# Patient Record
Sex: MALE | Race: WHITE | NOT HISPANIC OR LATINO | Employment: OTHER | URBAN - NONMETROPOLITAN AREA
[De-identification: names, ages, dates, MRNs, and addresses within clinical notes are randomized per-mention and may not be internally consistent; named-entity substitution may affect disease eponyms.]

---

## 2017-06-23 ENCOUNTER — TRANSFERRED RECORDS (OUTPATIENT)
Dept: HEALTH INFORMATION MANAGEMENT | Facility: HOSPITAL | Age: 72
End: 2017-06-23

## 2017-07-10 ENCOUNTER — OFFICE VISIT (OUTPATIENT)
Dept: FAMILY MEDICINE | Facility: OTHER | Age: 72
End: 2017-07-10
Attending: FAMILY MEDICINE
Payer: MEDICARE

## 2017-07-10 VITALS
DIASTOLIC BLOOD PRESSURE: 78 MMHG | WEIGHT: 160 LBS | OXYGEN SATURATION: 96 % | TEMPERATURE: 97.2 F | HEIGHT: 71 IN | BODY MASS INDEX: 22.4 KG/M2 | HEART RATE: 97 BPM | SYSTOLIC BLOOD PRESSURE: 128 MMHG

## 2017-07-10 DIAGNOSIS — F10.90 ALCOHOL USE DISORDER: ICD-10-CM

## 2017-07-10 DIAGNOSIS — E03.9 ACQUIRED HYPOTHYROIDISM: Primary | ICD-10-CM

## 2017-07-10 DIAGNOSIS — F03.91 DEMENTIA WITH BEHAVIORAL DISTURBANCE, UNSPECIFIED DEMENTIA TYPE: ICD-10-CM

## 2017-07-10 DIAGNOSIS — Z71.89 ACP (ADVANCE CARE PLANNING): Chronic | ICD-10-CM

## 2017-07-10 PROCEDURE — 99213 OFFICE O/P EST LOW 20 MIN: CPT | Performed by: FAMILY MEDICINE

## 2017-07-10 PROCEDURE — 99202 OFFICE O/P NEW SF 15 MIN: CPT | Performed by: FAMILY MEDICINE

## 2017-07-10 ASSESSMENT — ANXIETY QUESTIONNAIRES
5. BEING SO RESTLESS THAT IT IS HARD TO SIT STILL: NOT AT ALL
1. FEELING NERVOUS, ANXIOUS, OR ON EDGE: NOT AT ALL
4. TROUBLE RELAXING: NOT AT ALL
2. NOT BEING ABLE TO STOP OR CONTROL WORRYING: NOT AT ALL
6. BECOMING EASILY ANNOYED OR IRRITABLE: NOT AT ALL
GAD7 TOTAL SCORE: 0
3. WORRYING TOO MUCH ABOUT DIFFERENT THINGS: NOT AT ALL
7. FEELING AFRAID AS IF SOMETHING AWFUL MIGHT HAPPEN: NOT AT ALL

## 2017-07-10 ASSESSMENT — PAIN SCALES - GENERAL: PAINLEVEL: NO PAIN (0)

## 2017-07-10 NOTE — NURSING NOTE
"Chief Complaint   Patient presents with     Establish Care     would like to discuss establishing care        Initial /78 (BP Location: Left arm, Patient Position: Chair, Cuff Size: Adult Regular)  Pulse 97  Temp 97.2  F (36.2  C) (Tympanic)  Ht 5' 11\" (1.803 m)  Wt 160 lb (72.6 kg)  SpO2 96%  BMI 22.32 kg/m2 Estimated body mass index is 22.32 kg/(m^2) as calculated from the following:    Height as of this encounter: 5' 11\" (1.803 m).    Weight as of this encounter: 160 lb (72.6 kg).  Medication Reconciliation: complete     TERRENCE MARTÍNEZ      "

## 2017-07-10 NOTE — PROGRESS NOTES
SUBJECTIVE:                                                    Christiano Leon is a 72 year old male who presents to clinic today for the following health issues: he recently was released on a provisional discharge from [facility].  He was admitted to the [facility] for the competency restoration program in March of 2016.  This happened after being adjuicated incompetent to stand trial on charges of assault and the second-degree obstructing the legal process, possessing a pistol/assault weapon and fleeting on foot. He is on Seroquel and this will be managed through [facility] and he has an appointment tomorrow.       Hypothyroidism Follow-up    Since last visit, patient describes the following symptoms: Weight stable, no hair loss, no skin changes, no constipation, no loose stools    Amount of exercise or physical activity: 6-7 days/week for an average of greater than 60 minutes    Problems taking medications regularly: No    Medication side effects: none    Diet: regular (no restrictions)        Problem list and histories reviewed & adjusted, as indicated.  Additional history: as documented    Current Outpatient Prescriptions   Medication Sig Dispense Refill     Levothyroxine Sodium (SYNTHROID PO) Take 50 mcg by mouth daily       Acetaminophen (TYLENOL PO) Take 1,000 mg by mouth every 6 hours as needed for mild pain or fever       Docusate Sodium (COLACE PO) Take 200 mg by mouth 2 times daily       Cholecalciferol (VITAMIN D-3 PO) Take 1,000 Units by mouth daily       ARTIFICIAL TEAR OP Apply 1 drop to eye 4 times daily       QUEtiapine Fumarate (SEROQUEL PO) Take 900 mg by mouth At Bedtime       Allergies   Allergen Reactions     Penicillins        Reviewed and updated as needed this visit by clinical staff  Tobacco  Allergies  Meds  Med Hx  Surg Hx  Fam Hx  Soc Hx      Reviewed and updated as needed this visit by Provider         ROS:  Constitutional, HEENT, cardiovascular,  "pulmonary, gi and gu systems are negative, except as otherwise noted.    OBJECTIVE:     /78 (BP Location: Left arm, Patient Position: Chair, Cuff Size: Adult Regular)  Pulse 97  Temp 97.2  F (36.2  C) (Tympanic)  Ht 5' 11\" (1.803 m)  Wt 160 lb (72.6 kg)  SpO2 96%  BMI 22.32 kg/m2  Body mass index is 22.32 kg/(m^2).  GENERAL: alert and no distress, talkative.  Here with his .  HEENT: pupils reactive.  Poor dentition  NECK: no adenopathy, no asymmetry, masses, or scars and thyroid normal to palpation  RESP: lungs clear to auscultation - no rales, rhonchi or wheezes  CV: regular rate and rhythm, normal S1 S2, no S3 or S4, no murmur, click or rub, no peripheral edema and peripheral pulses strong  ABDOMEN: soft, nontender, no hepatosplenomegaly, no masses and bowel sounds normal  MS: no gross musculoskeletal defects noted, no edema    Diagnostic Test Results:  none     ASSESSMENT/PLAN:               ICD-10-CM    1. Acquired hypothyroidism E03.9 His  will try to track down his most recent TSH.   2. ACP (advance care planning) Z71.89    3. Dementia with behavioral disturbance, unspecified dementia type F03.91 Stable at this time. He will be seen at [facility] regarding his dementia and that appointment is tomorrow.  They will regulate his Seroquel.   4. Alcohol use disorder (H) F10.99 Seems stable           R Dustin Akbar MD  [facility] [place]    "

## 2017-07-10 NOTE — MR AVS SNAPSHOT
"              After Visit Summary   7/10/2017    Christiano Leon    MRN: [id number]           Patient Information     Date Of Birth          1945        Visit Information        Provider Department      7/10/2017 2:30 PM [facility]        Today's Diagnoses     ACP (advance care planning)          Care Instructions    Keep apt with [place]          Follow-ups after your visit        Who to contact     If you have questions or need follow up information about today's clinic visit or your schedule please contact [facility] directly at [phone].  Normal or non-critical lab and imaging results will be communicated to you by [employer], letter or phone within 4 business days after the clinic has received the results. If you do not hear from us within 7 days, please contact the clinic through [email or web address] or phone. If you have a critical or abnormal lab result, we will notify you by phone as soon as possible.  Submit refill requests through SeptRx or call your pharmacy and they will forward the refill request to us. Please allow 3 business days for your refill to be completed.          Additional Information About Your Visit        MyChart Information     SeptRx lets you send messages to your doctor, view your test results, renew your prescriptions, schedule appointments and more. To sign up, go to [email or web address] . Click on \"Log in\" on the left side of the screen, which will take you to the Welcome page. Then click on \"Sign up Now\" on the right side of the page.     You will be asked to enter the access code listed below, as well as some personal information. Please follow the directions to create your username and password.     Your access code is: [email or web address]  Expires: 10/8/2017  2:31 PM     Your access code will  in 90 days. If you need help or a new code, please call your [facility] or [phone].        Care EveryWhere ID     This is your Care " "EveryWhere ID. This could be used by other organizations to access your [place] medical records  [id number]        Your Vitals Were     Pulse Temperature Height Pulse Oximetry BMI (Body Mass Index)       97 97.2  F (36.2  C) (Tympanic) 5' 11\" (1.803 m) 96% 22.32 kg/m2        Blood Pressure from Last 3 Encounters:   07/10/17 128/78    Weight from Last 3 Encounters:   07/10/17 160 lb (72.6 kg)              Today, you had the following     No orders found for display       Primary Care Provider    None Specified       No primary provider on file.        Equal Access to Services     [facility]: Hadii kellee russo Soolivia, wacherelleda narinderadaha, josyybemilee kaalmadarrion robles, stoney roberson . So [facility] [phone].    ATENCIÓN: Si habla español, tiene a pierce disposición servicios gratuitos de asistencia lingüística. Llame al [phone].    We comply with applicable federal civil rights laws and [place] laws. We do not discriminate on the basis of race, color, national origin, age, disability sex, sexual orientation or gender identity.            Thank you!     Thank you for choosing [facility]  for your care. Our goal is always to provide you with excellent care. Hearing back from our patients is one way we can continue to improve our services. Please take a few minutes to complete the written survey that you may receive in the mail after your visit with us. Thank you!             Your Updated Medication List - Protect others around you: Learn how to safely use, store and throw away your medicines at www.disposemymeds.org.          This list is accurate as of: 7/10/17  2:31 PM.  Always use your most recent med list.                   Brand Name Dispense Instructions for use Diagnosis    ARTIFICIAL TEAR OP      Apply 1 drop to eye 4 times daily        COLACE PO      Take 200 mg by mouth 2 times daily        SEROQUEL PO      Take 900 mg by mouth At Bedtime        SYNTHROID PO      " Take 50 mcg by mouth daily        TYLENOL PO      Take 1,000 mg by mouth every 6 hours as needed for mild pain or fever        VITAMIN D-3 PO      Take 1,000 Units by mouth daily

## 2017-07-11 ASSESSMENT — PATIENT HEALTH QUESTIONNAIRE - PHQ9: SUM OF ALL RESPONSES TO PHQ QUESTIONS 1-9: 0

## 2017-07-11 ASSESSMENT — ANXIETY QUESTIONNAIRES: GAD7 TOTAL SCORE: 0

## 2017-08-31 DIAGNOSIS — E03.9 HYPOTHYROIDISM, UNSPECIFIED TYPE: Primary | ICD-10-CM

## 2017-08-31 RX ORDER — LEVOTHYROXINE SODIUM 50 UG/1
50 TABLET ORAL DAILY
Qty: 30 TABLET | Refills: 0 | Status: SHIPPED | OUTPATIENT
Start: 2017-08-31 | End: 2017-09-18

## 2017-08-31 NOTE — TELEPHONE ENCOUNTER
Synthroid historical in [facility]. No noted TSH. Last office visit on 7.10.17. Medication pended.Thank you

## 2017-09-12 DIAGNOSIS — K59.00 CONSTIPATION, UNSPECIFIED CONSTIPATION TYPE: Primary | ICD-10-CM

## 2017-09-12 RX ORDER — DOCUSATE SODIUM 100 MG/1
TABLET ORAL
Qty: 120 CAPSULE | Refills: 0 | Status: SHIPPED | OUTPATIENT
Start: 2017-09-12 | End: 2017-10-03

## 2017-09-12 NOTE — TELEPHONE ENCOUNTER
Docqlace  Last Written Prescription Date: never filled in epic   Last Fill Quantity: 120,  # refills: 0   Last Office Visit with [facility], [facility] or [facility] prescribing provider: 7/10/2017

## 2017-09-18 DIAGNOSIS — E03.9 HYPOTHYROIDISM, UNSPECIFIED TYPE: ICD-10-CM

## 2017-09-18 RX ORDER — LEVOTHYROXINE SODIUM 50 UG/1
TABLET ORAL
Qty: 30 TABLET | Refills: 0 | Status: SHIPPED | OUTPATIENT
Start: 2017-09-18 | End: 2017-09-25

## 2017-09-18 NOTE — TELEPHONE ENCOUNTER
Synthroid     Last Written Prescription Date: 8/31/17  Last Quantity: 30, # refills: 0  Last Office Visit with [facility] or [facility] prescribing provider: 7/10/17        No results found for: TSH

## 2017-09-18 NOTE — TELEPHONE ENCOUNTER
No TSH on file.    Last and only office visit from 7/10/17 has note: His  will try to track down his most recent TSH.  I have checked media and am unable to locate TSH.  Medication pended.  Please advise.  Thank you.

## 2017-09-25 DIAGNOSIS — E03.9 HYPOTHYROIDISM, UNSPECIFIED TYPE: ICD-10-CM

## 2017-09-26 RX ORDER — LEVOTHYROXINE SODIUM 50 UG/1
TABLET ORAL
Qty: 30 TABLET | Refills: 1 | Status: SHIPPED | OUTPATIENT
Start: 2017-09-26 | End: 2017-12-18

## 2017-09-26 NOTE — TELEPHONE ENCOUNTER
Levothyroxine  Last office visit: 7/10/17 that  would track down last TSH.  No TSH on file.    Last refill: 9/18/17 #30, 0 R.  Medication pended.  Please advise.  Thank you.

## 2017-10-03 DIAGNOSIS — K59.00 CONSTIPATION, UNSPECIFIED CONSTIPATION TYPE: ICD-10-CM

## 2017-10-03 NOTE — TELEPHONE ENCOUNTER
Docqlace      Last Written Prescription Date: 9/12/17  Last Fill Quantity: 120,  # refills: 0   Last Office Visit with [facility], [facility] or [facility] prescribing provider: 7/10/17

## 2017-10-04 RX ORDER — DOCUSATE SODIUM 100 MG/1
TABLET ORAL
Qty: 100 CAPSULE | Refills: 1 | Status: SHIPPED | OUTPATIENT
Start: 2017-10-04 | End: 2017-12-11

## 2017-10-31 DIAGNOSIS — E55.9 VITAMIN D DEFICIENCY: Primary | ICD-10-CM

## 2017-11-01 ENCOUNTER — TRANSFERRED RECORDS (OUTPATIENT)
Dept: HEALTH INFORMATION MANAGEMENT | Facility: HOSPITAL | Age: 72
End: 2017-11-01

## 2017-11-01 NOTE — TELEPHONE ENCOUNTER
Vit d          Last Written Prescription Date: never filled in epic   Last Fill Quantity: unknown,  # refills: unknown   Last Office Visit with [facility], [facility] or [facility] prescribing provider: 7/10/2017

## 2017-11-03 RX ORDER — CHOLECALCIFEROL (VITAMIN D3) 25 MCG
TABLET ORAL
Qty: 100 TABLET | Refills: 1 | Status: SHIPPED | OUTPATIENT
Start: 2017-11-03 | End: 2017-11-16

## 2017-11-03 NOTE — TELEPHONE ENCOUNTER
Please see note below.  Vitamin D never filled in Epic.  Medication pended.  Please review and sign if appropriate.  Thank you.

## 2017-11-16 ENCOUNTER — OFFICE VISIT (OUTPATIENT)
Dept: FAMILY MEDICINE | Facility: OTHER | Age: 72
End: 2017-11-16
Attending: FAMILY MEDICINE
Payer: MEDICARE

## 2017-11-16 VITALS
SYSTOLIC BLOOD PRESSURE: 122 MMHG | OXYGEN SATURATION: 97 % | HEART RATE: 80 BPM | RESPIRATION RATE: 18 BRPM | DIASTOLIC BLOOD PRESSURE: 80 MMHG | HEIGHT: 71 IN | TEMPERATURE: 96.4 F | WEIGHT: 165 LBS | BODY MASS INDEX: 23.1 KG/M2

## 2017-11-16 DIAGNOSIS — Z87.19 HISTORY OF INGUINAL HERNIA REPAIR: ICD-10-CM

## 2017-11-16 DIAGNOSIS — R10.31 RIGHT GROIN PAIN: Primary | ICD-10-CM

## 2017-11-16 DIAGNOSIS — Z98.890 HISTORY OF INGUINAL HERNIA REPAIR: ICD-10-CM

## 2017-11-16 PROCEDURE — 99212 OFFICE O/P EST SF 10 MIN: CPT

## 2017-11-16 PROCEDURE — 99213 OFFICE O/P EST LOW 20 MIN: CPT | Performed by: FAMILY MEDICINE

## 2017-11-16 ASSESSMENT — ANXIETY QUESTIONNAIRES
2. NOT BEING ABLE TO STOP OR CONTROL WORRYING: NOT AT ALL
GAD7 TOTAL SCORE: 0
3. WORRYING TOO MUCH ABOUT DIFFERENT THINGS: NOT AT ALL
6. BECOMING EASILY ANNOYED OR IRRITABLE: NOT AT ALL
7. FEELING AFRAID AS IF SOMETHING AWFUL MIGHT HAPPEN: NOT AT ALL
4. TROUBLE RELAXING: NOT AT ALL
5. BEING SO RESTLESS THAT IT IS HARD TO SIT STILL: NOT AT ALL
1. FEELING NERVOUS, ANXIOUS, OR ON EDGE: NOT AT ALL

## 2017-11-16 ASSESSMENT — PATIENT HEALTH QUESTIONNAIRE - PHQ9: SUM OF ALL RESPONSES TO PHQ QUESTIONS 1-9: 0

## 2017-11-16 ASSESSMENT — PAIN SCALES - GENERAL: PAINLEVEL: MODERATE PAIN (5)

## 2017-11-16 NOTE — MR AVS SNAPSHOT
After Visit Summary   11/16/2017    Christiano Leon    MRN: [id number]           Patient Information     Date Of Birth          1945        Visit Information        Provider Department      11/16/2017 2:30 PM ROBERT Akbar MD [facility]        Today's Diagnoses     Right groin pain    -  1    History of inguinal hernia repair          Care Instructions    Surgery will call you          Follow-ups after your visit        Additional Services     GENERAL SURG ADULT REFERRAL       Your provider has referred you to: [facility]: [facility] [phone]   [email or web address]    Please be aware that coverage of these services is subject to the terms and limitations of your health insurance plan.  Call member services at your health plan with any benefit or coverage questions.      Please bring the following with you to your appointment:    (1) Any X-Rays, CTs or MRIs which have been performed.  Contact the facility where they were done to arrange for  prior to your scheduled appointment.   (2) List of current medications   (3) This referral request   (4) Any documents/labs given to you for this referral                  Who to contact     If you have questions or need follow up information about today's clinic visit or your schedule please contact [facility] directly at [phone].  Normal or non-critical lab and imaging results will be communicated to you by MyChart, letter or phone within 4 business days after the clinic has received the results. If you do not hear from us within 7 days, please contact the clinic through MyChart or phone. If you have a critical or abnormal lab result, we will notify you by phone as soon as possible.  Submit refill requests through SNADEC or call your pharmacy and they will forward the refill request to us. Please allow 3 business days for your refill to be completed.  "         Additional Information About Your Visit        MyChart Information     [employer] lets you send messages to your doctor, view your test results, renew your prescriptions, schedule appointments and more. To sign up, go to [email or web address] . Click on \"Log in\" on the left side of the screen, which will take you to the Welcome page. Then click on \"Sign up Now\" on the right side of the page.     You will be asked to enter the access code listed below, as well as some personal information. Please follow the directions to create your username and password.     Your access code is: [email or web address]  Expires: 2018  2:35 PM     Your access code will  in 90 days. If you need help or a new code, please call your [place] clinic or [phone].        Care EveryWhere ID     This is your [facility] EveryWhere ID. This could be used by other organizations to access your [place] medical records  [id number]        Your Vitals Were     Pulse Temperature Respirations Height Pulse Oximetry BMI (Body Mass Index)    80 96.4  F (35.8  C) (Tympanic) 18 5' 11\" (1.803 m) 97% 23.01 kg/m2       Blood Pressure from Last 3 Encounters:   17 122/80   07/10/17 128/78    Weight from Last 3 Encounters:   17 165 lb (74.8 kg)   07/10/17 160 lb (72.6 kg)              We Performed the Following     GENERAL SURG ADULT REFERRAL        Primary Care Provider Office Phone # Fax #    R Dustin Akbar [phone]       [facility] [place] [address] [place] [id number]        Equal Access to Services     [facility] : Hadgreta Pedroza, alden ann, stoney don. So [facility] [phone].    ATENCIÓN: Si habla español, tiene a pierce disposición servicios gratuitos de asistencia lingüística. Anabel al [phone].    We comply with applicable federal civil rights laws and [place] laws. We do not discriminate on the basis of race, color, " national origin, age, disability, sex, sexual orientation, or gender identity.            Thank you!     Thank you for choosing [facility]  for your care. Our goal is always to provide you with excellent care. Hearing back from our patients is one way we can continue to improve our services. Please take a few minutes to complete the written survey that you may receive in the mail after your visit with us. Thank you!             Your Updated Medication List - Protect others around you: Learn how to safely use, store and throw away your medicines at www.disposemymeds.org.          This list is accurate as of: 11/16/17  2:35 PM.  Always use your most recent med list.                   Brand Name Dispense Instructions for use Diagnosis    ARTIFICIAL TEAR OP      Apply 1 drop to eye 4 times daily        DOCQLACE 100 MG capsule   Generic drug:  docusate sodium     100 capsule    TAKE 2 CAPSULES (200MG) BY MOUTH TWICE DAILY    Constipation, unspecified constipation type       levothyroxine 50 MCG tablet    SYNTHROID/LEVOTHROID    30 tablet    TAKE 1 TABLET (50 MCG) BY MOUTH DAILY    Hypothyroidism, unspecified type       SEROQUEL PO      Take 900 mg by mouth At Bedtime        TYLENOL PO      Take 1,000 mg by mouth every 6 hours as needed for mild pain or fever        VITAMIN D-3 PO      Take 1,000 Units by mouth daily

## 2017-11-16 NOTE — PROGRESS NOTES
"  SUBJECTIVE:   Christiano Leon is a 72 year old male who presents to clinic today for the following health issues:      Groin pain. Patients had intermittent pain with activities such as walking or carrying items.  If he rests it goes away.  No fever or chills or dysuria or stool problems.  About 10 years ago had a mesh for right inguinal hernia repair.        PAST MEDICAL HISTORY:  Past Medical History:   Diagnosis Date     BPH (benign prostatic hyperplasia)      Poor dentition        PAST SURGICAL HISTORY:  Past Surgical History:   Procedure Laterality Date     Bilateral cataract       HERNIA REPAIR Right 2007?     Right knee meniscectomy Right        MEDICATIONS:  Prior to Admission medications    Medication Sig Start Date End Date Taking? Authorizing Provider   DOCQLACE 100 MG capsule TAKE 2 CAPSULES (200MG) BY MOUTH TWICE DAILY 10/4/17  Yes ROBERT Akbar MD   levothyroxine (SYNTHROID/LEVOTHROID) 50 MCG tablet TAKE 1 TABLET (50 MCG) BY MOUTH DAILY 9/26/17  Yes ROBERT Akbar MD   Acetaminophen (TYLENOL PO) Take 1,000 mg by mouth every 6 hours as needed for mild pain or fever   Yes Reported, Patient   Cholecalciferol (VITAMIN D-3 PO) Take 1,000 Units by mouth daily   Yes Reported, Patient   ARTIFICIAL TEAR OP Apply 1 drop to eye 4 times daily   Yes Reported, Patient   QUEtiapine Fumarate (SEROQUEL PO) Take 900 mg by mouth At Bedtime   Yes Reported, Patient       ALLERGIES:     Allergies   Allergen Reactions     Penicillins        ROS:  Constitutional, HEENT, cardiovascular, pulmonary, gi and gu systems are negative, except as otherwise noted.        EXAM:/80 (BP Location: Left arm, Patient Position: Sitting, Cuff Size: Adult Regular)  Pulse 80  Temp 96.4  F (35.8  C) (Tympanic)  Resp 18  Ht 5' 11\" (1.803 m)  Wt 165 lb (74.8 kg)  SpO2 97%  BMI 23.01 kg/m2 Body mass index is 23.01 kg/(m^2).   GENERAL APPEARANCE: healthy, alert and no distress  EYES: Eyes grossly normal to inspection, PERRL and " conjunctivae and sclerae normal  HENT: ear canals and TM's normal and nose and mouth without ulcers or lesions   (male): Testicles normal.  Penis normal.  No palpable hernia but he has tenderness in the right groin where he had his surgery.  There is no ecchymosis or swelling.  No evidence of left inguinal hernia  NEURO: Normal strength and tone, mentation intact and speech normal  Lab/ X-ray  No results found for this or any previous visit (from the past 24 hour(s)).    ASSESSMENT/PLAN:    ICD-10-CM    1. Right groin pain R10.31 GENERAL SURG ADULT REFERRAL   2. History of inguinal hernia repair Z98.890 GENERAL SURG ADULT REFERRAL. He will avoid heavy activities and we will arrange for surgical consult For his Pain in the previous site of  the hernia repair. [facility]    Z87.19          MALVIN Akbar MD  November 16, 2017

## 2017-11-16 NOTE — NURSING NOTE
"Chief Complaint   Patient presents with     Pain     right groin area. Has had past history of hernia there.       Initial /80 (BP Location: Left arm, Patient Position: Sitting, Cuff Size: Adult Regular)  Pulse 80  Temp 96.4  F (35.8  C) (Tympanic)  Resp 18  Ht 5' 11\" (1.803 m)  Wt 165 lb (74.8 kg)  SpO2 97%  BMI 23.01 kg/m2 Estimated body mass index is 23.01 kg/(m^2) as calculated from the following:    Height as of this encounter: 5' 11\" (1.803 m).    Weight as of this encounter: 165 lb (74.8 kg).  Medication Reconciliation: complete   Che Ross LPN      "

## 2017-11-17 ASSESSMENT — ANXIETY QUESTIONNAIRES: GAD7 TOTAL SCORE: 0

## 2017-12-04 ENCOUNTER — OFFICE VISIT (OUTPATIENT)
Dept: SURGERY | Facility: OTHER | Age: 72
End: 2017-12-04
Attending: FAMILY MEDICINE
Payer: MEDICARE

## 2017-12-04 VITALS
HEIGHT: 71 IN | OXYGEN SATURATION: 96 % | WEIGHT: 165 LBS | HEART RATE: 74 BPM | SYSTOLIC BLOOD PRESSURE: 124 MMHG | DIASTOLIC BLOOD PRESSURE: 84 MMHG | TEMPERATURE: 96.8 F | BODY MASS INDEX: 23.1 KG/M2

## 2017-12-04 DIAGNOSIS — Z98.890 HISTORY OF INGUINAL HERNIA REPAIR: ICD-10-CM

## 2017-12-04 DIAGNOSIS — R10.31 RIGHT GROIN PAIN: ICD-10-CM

## 2017-12-04 DIAGNOSIS — Z87.19 HISTORY OF INGUINAL HERNIA REPAIR: ICD-10-CM

## 2017-12-04 PROCEDURE — 99213 OFFICE O/P EST LOW 20 MIN: CPT

## 2017-12-04 PROCEDURE — 99203 OFFICE O/P NEW LOW 30 MIN: CPT | Performed by: SURGERY

## 2017-12-04 ASSESSMENT — PAIN SCALES - GENERAL: PAINLEVEL: SEVERE PAIN (6)

## 2017-12-04 NOTE — PROGRESS NOTES
Surgery Consult Clinic Note      RE: Christiano Leon  : 1945    Chief Complaint:  Right groin pain    History of Present Illness:  Mr. Leon is a very pleasant 72 year old male who I am seeing at the request of ROBERT Akbar MD for evaluation of right groin pain and to make further recommendations.  Has open right inguinal hernia surgery 10 years ago.  Since that time, he's had pain in the right groin.  States he can feel the mesh and it causes him sharp pain at times, worse with bending over and after a day's work.  Denies nausea, vomiting, unexplained bloating. He specifically denies fever, chills, nausea, vomiting, chest pain, shortness of breath or palpitations.      Medical history:  Past Medical History:   Diagnosis Date     BPH (benign prostatic hyperplasia)      Poor dentition        Surgical history:  Past Surgical History:   Procedure Laterality Date     Bilateral cataract       HERNIA REPAIR Right ?     Right knee meniscectomy Right        Family history:  No family history on file.    Medications:  Current Outpatient Prescriptions   Medication Sig Dispense Refill     DOCQLACE 100 MG capsule TAKE 2 CAPSULES (200MG) BY MOUTH TWICE DAILY 100 capsule 1     levothyroxine (SYNTHROID/LEVOTHROID) 50 MCG tablet TAKE 1 TABLET (50 MCG) BY MOUTH DAILY 30 tablet 1     Acetaminophen (TYLENOL PO) Take 1,000 mg by mouth every 6 hours as needed for mild pain or fever       Cholecalciferol (VITAMIN D-3 PO) Take 1,000 Units by mouth daily       ARTIFICIAL TEAR OP Apply 1 drop to eye 4 times daily       QUEtiapine Fumarate (SEROQUEL PO) Take 900 mg by mouth At Bedtime         Allergies:  The patientis allergic to nickel; penicillins; and seasonal allergies.  .  Social history:  Social History   Substance Use Topics     Smoking status: Former Smoker     Smokeless tobacco: Never Used     Alcohol use No     Marital status: single.    Review of Systems:    Constitutional: Negative for fever, chills and weight loss.  "  HENT: Negative for ear pain, nosebleeds, congestion, sore throat, tinnitus and ear discharge.    Eyes: Negative for blurred vision, double vision, photophobia and pain.   Respiratory: Negative for cough, hemoptysis, shortness of breath, wheezing and stridor.    Cardiovascular: Negative for chest pain, palpitations and orthopnea.   Gastrointestinal: Per HPI  Genitourinary: Negative for urgency, frequency and hematuria.   Musculoskeletal: Negative for myalgias, back pain and joint pain.   Neurological: Negative for tingling, speech change and headaches.   Endo/Heme/Allergies: Does not bruise/bleed easily.   Psychiatric/Behavioral: Negative for depression, suicidal ideas and hallucinations. The patient is not nervous/anxious.    Physical Examination:  /84  Pulse 74  Temp 96.8  F (36  C) (Tympanic)  Ht 1.803 m (5' 11\")  Wt 74.8 kg (165 lb)  SpO2 96%  BMI 23.01 kg/m2  General: AAOx4, NAD, WN/WD, ambulating without assistance  HEENT:NCAT, EOMI, PERRL Sclerae anicteric; Trachea mideline, no JVD  Chest:   Clear to auscultation bilaterally.  Cardiac: S1S2 , regular rate and rhythm without additional sounds  Abdomen: Soft, ND/NT no rebound, no guarding.  Right groin firm mass just medial to the ASIS that is non reducible, doesn't increased in size with cough.  Inguinal canal free of masses or pain.  Left groin free of masses  Extremities: Cursory exam unremarkable.  Skin: Warm, dry, < 2 sec cap refill  Neuro: CN 2-12 grossly intact, no focal deficit, GCS 15  Psych: happy, calm, asks appropriate questions      Assessment/Plan:  #1 Right groin pain    I don't feel a hernia, but I do feel what is likely a granuloma from the previous surgery.  We'll order the ultrasound to rule out recurrence.  Then we'll talk about exploration or not.  I'd be more tempted to trial a trigger point injection first.          Dr Portillo  [facility] and [address], [address]  [place], [place]   "  [id number]    Referring Provider:  ROBERT Akbar MD  [address] [address]     Primary Care Provider:  ROBERT Akbar

## 2017-12-04 NOTE — MR AVS SNAPSHOT
After Visit Summary   12/4/2017    Christiano Leon    MRN: [id number]           Patient Information     Date Of Birth          1945        Visit Information        Provider Department      12/4/2017 1:45 PM Jose Maria Portillo, DO [facility]        Today's Diagnoses     Right groin pain        History of inguinal hernia repair          Care Instructions    Thank you for allowing Dr. Portillo and our surgical team to participate in your care. Please call with any scheduling questions or concerns to [facility] at [phone] or for nursing questions Carol [phone].\  You have an order placed with [facility]. They will call you to set up an appointment for your test specified by Dr. Portillo.  We will call with results and follow up appointment when we get results from ultrasound.            Follow-ups after your visit        Future tests that were ordered for you today     Open Future Orders        Priority Expected Expires Ordered    US Abdomen Limited Routine  12/4/2018 12/4/2017            Who to contact     If you have questions or need follow up information about today's clinic visit or your schedule please contact [facility] directly at [phone].  Normal or non-critical lab and imaging results will be communicated to you by Novafor[employer]art, letter or phone within 4 business days after the clinic has received the results. If you do not hear from us within 7 days, please contact the clinic through [employer] or phone. If you have a critical or abnormal lab result, we will notify you by phone as soon as possible.  Submit refill requests through [employer] or call your pharmacy and they will forward the refill request to us. Please allow 3 business days for your refill to be completed.          Additional Information About Your Visit        Nova[employer]Net Information     [employer] lets you send messages to your doctor, view your test results, renew your prescriptions, schedule  "appointments and more. To sign up, go to [email or web address] . Click on \"Log in\" on the left side of the screen, which will take you to the Welcome page. Then click on \"Sign up Now\" on the right side of the page.     You will be asked to enter the access code listed below, as well as some personal information. Please follow the directions to create your username and password.     Your access code is: [email or web address]  Expires: 2018  2:35 PM     Your access code will  in 90 days. If you need help or a new code, please call your [facility] clinic or [phone].        Care EveryWhere ID     This is your Care EveryWhere ID. This could be used by other organizations to access your [facility] medical records  [id number]        Your Vitals Were     Pulse Temperature Height Pulse Oximetry BMI (Body Mass Index)       74 96.8  F (36  C) (Tympanic) 5' 11\" (1.803 m) 96% 23.01 kg/m2        Blood Pressure from Last 3 Encounters:   17 124/84   17 122/80   07/10/17 128/78    Weight from Last 3 Encounters:   17 165 lb (74.8 kg)   17 165 lb (74.8 kg)   07/10/17 160 lb (72.6 kg)               Primary Care Provider Office Phone # Fax #    R Dustin Akbar [phone]       [place] [address]        Equal Access to Services     ANA CARDONA AH: Hadii kellee posadas hadasho Soomaali, waaxda luqadaha, qaybta kaalmada adeegyadarrion, stoney roberson . So [facility] [phone].    ATENCIÓN: Si habla español, tiene a pierce disposición servicios gratuitos de asistencia lingüística. Llame al [phone].    We comply with applicable federal civil rights laws and [place] laws. We do not discriminate on the basis of race, color, national origin, age, disability, sex, sexual orientation, or gender identity.            Thank you!     Thank you for choosing [facility]  for your care. Our goal is always to provide you with excellent " care. Hearing back from our patients is one way we can continue to improve our services. Please take a few minutes to complete the written survey that you may receive in the mail after your visit with us. Thank you!             Your Updated Medication List - Protect others around you: Learn how to safely use, store and throw away your medicines at www.disposemymeds.org.          This list is accurate as of: 12/4/17  2:02 PM.  Always use your most recent med list.                   Brand Name Dispense Instructions for use Diagnosis    ARTIFICIAL TEAR OP      Apply 1 drop to eye 4 times daily        DOCQLACE 100 MG capsule   Generic drug:  docusate sodium     100 capsule    TAKE 2 CAPSULES (200MG) BY MOUTH TWICE DAILY    Constipation, unspecified constipation type       levothyroxine 50 MCG tablet    SYNTHROID/LEVOTHROID    30 tablet    TAKE 1 TABLET (50 MCG) BY MOUTH DAILY    Hypothyroidism, unspecified type       SEROQUEL PO      Take 900 mg by mouth At Bedtime        TYLENOL PO      Take 1,000 mg by mouth every 6 hours as needed for mild pain or fever        VITAMIN D-3 PO      Take 1,000 Units by mouth daily

## 2017-12-04 NOTE — PATIENT INSTRUCTIONS
Thank you for allowing Dr. Portillo and our surgical team to participate in your care. Please call with any scheduling questions or concerns to [facility] at [phone] or for nursing questions Carol [phone].\  You have an order placed with [facility]. They will call you to set up an appointment for your test specified by Dr. Portillo.  We will call with results and follow up appointment when we get results from ultrasound.

## 2017-12-04 NOTE — NURSING NOTE
"Chief Complaint   Patient presents with     Consult     right groin pain , hx of inguinal hernia repair, hx of bilateral repair in 2007 at [facility]       Initial /84  Pulse 74  Temp 96.8  F (36  C) (Tympanic)  Ht 5' 11\" (1.803 m)  Wt 165 lb (74.8 kg)  SpO2 96%  BMI 23.01 kg/m2 Estimated body mass index is 23.01 kg/(m^2) as calculated from the following:    Height as of this encounter: 5' 11\" (1.803 m).    Weight as of this encounter: 165 lb (74.8 kg).  Medication Reconciliation: complete     Carol Rivera      "

## 2017-12-11 ENCOUNTER — HOSPITAL ENCOUNTER (OUTPATIENT)
Dept: ULTRASOUND IMAGING | Facility: HOSPITAL | Age: 72
Discharge: HOME | End: 2017-12-11
Attending: SURGERY | Admitting: SURGERY
Payer: MEDICARE

## 2017-12-11 DIAGNOSIS — Z87.19 HISTORY OF INGUINAL HERNIA REPAIR: ICD-10-CM

## 2017-12-11 DIAGNOSIS — Z98.890 HISTORY OF INGUINAL HERNIA REPAIR: ICD-10-CM

## 2017-12-11 DIAGNOSIS — R10.31 RIGHT GROIN PAIN: ICD-10-CM

## 2017-12-11 DIAGNOSIS — K59.00 CONSTIPATION, UNSPECIFIED CONSTIPATION TYPE: ICD-10-CM

## 2017-12-11 PROCEDURE — 76705 ECHO EXAM OF ABDOMEN: CPT | Mod: TC

## 2017-12-12 NOTE — TELEPHONE ENCOUNTER
DOK 100MG CAP      Last Written Prescription Date: 10/04/2017  Last Fill Quantity: 100,  # refills: 1   Last Office Visit with [facility], [facility] or [facility] prescribing provider: 11/16/2017                                         Next 5 appointments (look out 90 days)     Dec 19, 2017  9:30 AM CST   (Arrive by 9:15 AM)   Return Visit with Jose Maria Portillo DO   [facility] ([facility] )    [address] [place] [address]   [phone]

## 2017-12-14 ENCOUNTER — DOCUMENTATION ONLY (OUTPATIENT)
Dept: VASCULAR SURGERY | Facility: CLINIC | Age: 72
End: 2017-12-14

## 2017-12-14 DIAGNOSIS — Z13.6 ENCOUNTER FOR ABDOMINAL AORTIC ANEURYSM (AAA) SCREENING: Primary | ICD-10-CM

## 2017-12-14 RX ORDER — DOCUSATE SODIUM 100 MG/1
CAPSULE, LIQUID FILLED ORAL
Qty: 100 CAPSULE | Refills: 11 | Status: SHIPPED | OUTPATIENT
Start: 2017-12-14 | End: 2018-09-11

## 2017-12-18 DIAGNOSIS — E03.9 HYPOTHYROIDISM, UNSPECIFIED TYPE: ICD-10-CM

## 2017-12-20 RX ORDER — LEVOTHYROXINE SODIUM 50 UG/1
TABLET ORAL
Qty: 30 TABLET | Refills: 0 | Status: SHIPPED | OUTPATIENT
Start: 2017-12-20 | End: 2018-01-22

## 2018-01-02 ENCOUNTER — OFFICE VISIT (OUTPATIENT)
Dept: SURGERY | Facility: OTHER | Age: 73
End: 2018-01-02
Attending: SURGERY
Payer: MEDICARE

## 2018-01-02 VITALS
DIASTOLIC BLOOD PRESSURE: 68 MMHG | WEIGHT: 165 LBS | TEMPERATURE: 96.2 F | HEIGHT: 71 IN | BODY MASS INDEX: 23.1 KG/M2 | RESPIRATION RATE: 18 BRPM | HEART RATE: 97 BPM | OXYGEN SATURATION: 95 % | SYSTOLIC BLOOD PRESSURE: 118 MMHG

## 2018-01-02 DIAGNOSIS — R10.31 RIGHT GROIN PAIN: Primary | ICD-10-CM

## 2018-01-02 PROCEDURE — G0463 HOSPITAL OUTPT CLINIC VISIT: HCPCS

## 2018-01-02 PROCEDURE — 99213 OFFICE O/P EST LOW 20 MIN: CPT | Performed by: SURGERY

## 2018-01-02 ASSESSMENT — ANXIETY QUESTIONNAIRES
1. FEELING NERVOUS, ANXIOUS, OR ON EDGE: NOT AT ALL
3. WORRYING TOO MUCH ABOUT DIFFERENT THINGS: NOT AT ALL
2. NOT BEING ABLE TO STOP OR CONTROL WORRYING: NOT AT ALL
5. BEING SO RESTLESS THAT IT IS HARD TO SIT STILL: NOT AT ALL
6. BECOMING EASILY ANNOYED OR IRRITABLE: NOT AT ALL
7. FEELING AFRAID AS IF SOMETHING AWFUL MIGHT HAPPEN: NOT AT ALL
IF YOU CHECKED OFF ANY PROBLEMS ON THIS QUESTIONNAIRE, HOW DIFFICULT HAVE THESE PROBLEMS MADE IT FOR YOU TO DO YOUR WORK, TAKE CARE OF THINGS AT HOME, OR GET ALONG WITH OTHER PEOPLE: NOT DIFFICULT AT ALL
GAD7 TOTAL SCORE: 0

## 2018-01-02 ASSESSMENT — PAIN SCALES - GENERAL: PAINLEVEL: NO PAIN (0)

## 2018-01-02 ASSESSMENT — PATIENT HEALTH QUESTIONNAIRE - PHQ9
SUM OF ALL RESPONSES TO PHQ QUESTIONS 1-9: 0
5. POOR APPETITE OR OVEREATING: NOT AT ALL

## 2018-01-02 NOTE — PATIENT INSTRUCTIONS
Thank you for allowing Dr. Jaquez and our surgical team to participate in your care. Please call with any scheduling questions or concerns to [facility] at [phone] or for nursing questions  Faye [phone].

## 2018-01-02 NOTE — MR AVS SNAPSHOT
"              After Visit Summary   1/2/2018    Christiano Leon    MRN: [id number]           Patient Information     Date Of Birth          1945        Visit Information        Provider Department      1/2/2018 9:30 AM Jose Maria Portillo, DO [facility]        Today's Diagnoses     Right groin pain    -  1      Care Instructions    Thank you for allowing Dr. Kitchen's and our surgical team to participate in your care. Please call with any scheduling questions or concerns to [facility] at [phone] or for nursing questions  Faye [phone].          Follow-ups after your visit        Who to contact     If you have questions or need follow up information about today's clinic visit or your schedule please contact [facility] directly at [phone].  Normal or non-critical lab and imaging results will be communicated to you by [place] [employer], letter or phone within 4 business days after the clinic has received the results. If you do not hear from us within 7 days, please contact the clinic through MyChart or phone. If you have a critical or abnormal lab result, we will notify you by phone as soon as possible.  Submit refill requests through [employer] or call your pharmacy and they will forward the refill request to us. Please allow 3 business days for your refill to be completed.          Additional Information About Your Visit        [employer] Information     [employer] lets you send messages to your doctor, view your test results, renew your prescriptions, schedule appointments and more. To sign up, go to [email or web address] . Click on \"Log in\" on the left side of the screen, which will take you to the Welcome page. Then click on \"Sign up Now\" on the right side of the page.     You will be asked to enter the access code listed below, as well as some personal information. Please follow the directions to create your username and password.     Your access code is: [email or web address]  Expires: 2/14/2018  " "2:35 PM     Your access code will  in 90 days. If you need help or a new code, please call your [facility] or [phone].        Care EveryWhere ID     This is your Care EveryWhere ID. This could be used by other organizations to access your [facility] medical records  [id number]        Your Vitals Were     Pulse Temperature Respirations Height Pulse Oximetry BMI (Body Mass Index)    97 96.2  F (35.7  C) (Tympanic) 18 5' 11\" (1.803 m) 95% 23.01 kg/m2       Blood Pressure from Last 3 Encounters:   18 118/68   17 124/84   17 122/80    Weight from Last 3 Encounters:   18 165 lb (74.8 kg)   17 165 lb (74.8 kg)   17 165 lb (74.8 kg)              Today, you had the following     No orders found for display       Primary Care Provider Office Phone # Fax #    R Dustin Akbar [phone]       [place] [facility] [address] [place], [place] [id number]        Equal Access to Services     : Hadii aad ku hadasho Soomaali, waaxda luqadaha, qaybta kaalmada adeegyada, waxgeo mejiain hayaan adejennifer roberson . So [facility] [phone].    ATENCIÓN: Si habla español, tiene a pierce disposición servicios gratuitos de asistencia lingüística. Llame al [phone].    We comply with applicable federal civil rights laws and [place] laws. We do not discriminate on the basis of race, color, national origin, age, disability, sex, sexual orientation, or gender identity.            Thank you!     Thank you for choosing [facility]  for your care. Our goal is always to provide you with excellent care. Hearing back from our patients is one way we can continue to improve our services. Please take a few minutes to complete the written survey that you may receive in the mail after your visit with us. Thank you!             Your Updated Medication List - Protect others around you: Learn how to safely use, store and throw away your medicines at " www.disposemymeds.org.          This list is accurate as of: 1/2/18  9:44 AM.  Always use your most recent med list.                   Brand Name Dispense Instructions for use Diagnosis    ARTIFICIAL TEAR OP      Apply 1 drop to eye 4 times daily         MG capsule   Generic drug:  docusate sodium     100 capsule    TAKE 2 CAPSULES (200MG) BY MOUTH TWICE DAILY    Constipation, unspecified constipation type       levothyroxine 50 MCG tablet    SYNTHROID/LEVOTHROID    30 tablet    TAKE 1 TABLET DAILY BY MOUTH    Hypothyroidism, unspecified type       SEROQUEL PO      Take 900 mg by mouth At Bedtime        TYLENOL PO      Take 1,000 mg by mouth every 6 hours as needed for mild pain or fever        VITAMIN D-3 PO      Take 1,000 Units by mouth daily

## 2018-01-02 NOTE — NURSING NOTE
"Chief Complaint   Patient presents with     Consult     Follow up Rt groin pain  US 12/11/17       Initial /68 (BP Location: Left arm, Patient Position: Sitting, Cuff Size: Adult Regular)  Pulse 97  Temp 96.2  F (35.7  C) (Tympanic)  Resp 18  Ht 5' 11\" (1.803 m)  Wt 165 lb (74.8 kg)  SpO2 95%  BMI 23.01 kg/m2 Estimated body mass index is 23.01 kg/(m^2) as calculated from the following:    Height as of this encounter: 5' 11\" (1.803 m).    Weight as of this encounter: 165 lb (74.8 kg).  Medication Reconciliation: complete   Lexy Arevalo MA  "

## 2018-01-02 NOTE — PROGRESS NOTES
"S: here to review ultrasound results.  Came in with chronic right groin pain.  History of open right inguinal hernia surgery.  No recurrence felt.  Ultrasound negative for recurrence.  Still occasionally gets the pain if he overworks that resolves in a day.    O:  /68 (BP Location: Left arm, Patient Position: Sitting, Cuff Size: Adult Regular)  Pulse 97  Temp 96.2  F (35.7  C) (Tympanic)  Resp 18  Ht 1.803 m (5' 11\")  Wt 74.8 kg (165 lb)  SpO2 95%  BMI 23.01 kg/m2   General: AAOx4, NAD, WN/WD, ambulating without assistance  HEENT:NCAT, EOMI, PERRL Sclerae anicteric; Trachea mideline, no JVD  Chest:   Clear to auscultation bilaterally.  Cardiac: S1S2 , regular rate and rhythm without additional sounds  Abdomen: Soft, ND/NT no rebound, no guarding.  Right groin firm mass just medial to the ASIS that is non reducible, doesn't increased in size with cough.  Inguinal canal free of masses or pain.  Left groin free of masses  Extremities: Cursory exam unremarkable.  Skin: Warm, dry, < 2 sec cap refill  Neuro: CN 2-12 grossly intact, no focal deficit, GCS 15  Psych: happy, calm, asks appropriate questions    A/P  #1 Right groin pain  #2 Suture granuloma    Patient comfortable with observing for now.  Just wanted to make sure it wasn't recurrence.  Return PRN.  "

## 2018-01-03 ASSESSMENT — ANXIETY QUESTIONNAIRES: GAD7 TOTAL SCORE: 0

## 2018-01-22 DIAGNOSIS — E03.9 HYPOTHYROIDISM, UNSPECIFIED TYPE: ICD-10-CM

## 2018-01-22 RX ORDER — LEVOTHYROXINE SODIUM 50 UG/1
TABLET ORAL
Qty: 30 TABLET | Refills: 0 | Status: SHIPPED | OUTPATIENT
Start: 2018-01-22 | End: 2018-02-05

## 2018-01-22 NOTE — TELEPHONE ENCOUNTER
Synthroid  Last Written Prescription Date: 12/20/17  Last Fill Quantity: 30 # of Refills: 0  Last Office Visit: 11/16/17

## 2018-02-05 DIAGNOSIS — E03.9 HYPOTHYROIDISM, UNSPECIFIED TYPE: ICD-10-CM

## 2018-02-05 NOTE — TELEPHONE ENCOUNTER
levothyroxine (SYNTHROID/LEVOTHROID) 50 MCG tablet     Last Written Prescription Date:  01/22/2018  Last Fill Quantity: 30,   # refills: 0  Last Office Visit: 11/16/2017  Future Office visit:       Routing refill request to provider for review/approval because:

## 2018-02-06 RX ORDER — LEVOTHYROXINE SODIUM 50 UG/1
TABLET ORAL
Qty: 30 TABLET | Refills: 3 | Status: SHIPPED | OUTPATIENT
Start: 2018-02-06 | End: 2018-06-18

## 2018-03-02 ENCOUNTER — RADIANT APPOINTMENT (OUTPATIENT)
Dept: GENERAL RADIOLOGY | Facility: OTHER | Age: 73
End: 2018-03-02
Attending: FAMILY MEDICINE
Payer: MEDICARE

## 2018-03-02 ENCOUNTER — OFFICE VISIT (OUTPATIENT)
Dept: FAMILY MEDICINE | Facility: OTHER | Age: 73
End: 2018-03-02
Attending: FAMILY MEDICINE
Payer: MEDICARE

## 2018-03-02 VITALS
DIASTOLIC BLOOD PRESSURE: 72 MMHG | OXYGEN SATURATION: 96 % | WEIGHT: 168.5 LBS | BODY MASS INDEX: 23.5 KG/M2 | HEART RATE: 81 BPM | SYSTOLIC BLOOD PRESSURE: 104 MMHG | TEMPERATURE: 97 F

## 2018-03-02 DIAGNOSIS — R61 DIAPHORESIS: ICD-10-CM

## 2018-03-02 DIAGNOSIS — F03.91 DEMENTIA WITH BEHAVIORAL DISTURBANCE, UNSPECIFIED DEMENTIA TYPE: Primary | ICD-10-CM

## 2018-03-02 DIAGNOSIS — E03.9 ACQUIRED HYPOTHYROIDISM: ICD-10-CM

## 2018-03-02 LAB
ANION GAP SERPL CALCULATED.3IONS-SCNC: 7 MMOL/L (ref 3–14)
BASOPHILS # BLD AUTO: 0 10E9/L (ref 0–0.2)
BASOPHILS NFR BLD AUTO: 0.4 %
BUN SERPL-MCNC: 16 MG/DL (ref 7–30)
CALCIUM SERPL-MCNC: 8.3 MG/DL (ref 8.5–10.1)
CHLORIDE SERPL-SCNC: 111 MMOL/L (ref 94–109)
CO2 SERPL-SCNC: 25 MMOL/L (ref 20–32)
CREAT SERPL-MCNC: 1.03 MG/DL (ref 0.66–1.25)
CRP SERPL-MCNC: 4.4 MG/L (ref 0–8)
DIFFERENTIAL METHOD BLD: NORMAL
EOSINOPHIL # BLD AUTO: 0.1 10E9/L (ref 0–0.7)
EOSINOPHIL NFR BLD AUTO: 2.1 %
ERYTHROCYTE [DISTWIDTH] IN BLOOD BY AUTOMATED COUNT: 13.3 % (ref 10–15)
ERYTHROCYTE [SEDIMENTATION RATE] IN BLOOD BY WESTERGREN METHOD: 11 MM/H (ref 0–20)
GFR SERPL CREATININE-BSD FRML MDRD: 70 ML/MIN/1.7M2
GLUCOSE SERPL-MCNC: 119 MG/DL (ref 70–99)
HCT VFR BLD AUTO: 40.3 % (ref 40–53)
HGB BLD-MCNC: 13.5 G/DL (ref 13.3–17.7)
IMM GRANULOCYTES # BLD: 0 10E9/L (ref 0–0.4)
IMM GRANULOCYTES NFR BLD: 0.4 %
LYMPHOCYTES # BLD AUTO: 1.2 10E9/L (ref 0.8–5.3)
LYMPHOCYTES NFR BLD AUTO: 25.8 %
MCH RBC QN AUTO: 30.3 PG (ref 26.5–33)
MCHC RBC AUTO-ENTMCNC: 33.5 G/DL (ref 31.5–36.5)
MCV RBC AUTO: 91 FL (ref 78–100)
MONOCYTES # BLD AUTO: 0.5 10E9/L (ref 0–1.3)
MONOCYTES NFR BLD AUTO: 9.8 %
NEUTROPHILS # BLD AUTO: 2.9 10E9/L (ref 1.6–8.3)
NEUTROPHILS NFR BLD AUTO: 61.5 %
NRBC # BLD AUTO: 0 10*3/UL
NRBC BLD AUTO-RTO: 0 /100
PLATELET # BLD AUTO: 167 10E9/L (ref 150–450)
POTASSIUM SERPL-SCNC: 3.8 MMOL/L (ref 3.4–5.3)
RBC # BLD AUTO: 4.45 10E12/L (ref 4.4–5.9)
SODIUM SERPL-SCNC: 143 MMOL/L (ref 133–144)
TSH SERPL DL<=0.005 MIU/L-ACNC: 1.26 MU/L (ref 0.4–4)
WBC # BLD AUTO: 4.7 10E9/L (ref 4–11)

## 2018-03-02 PROCEDURE — 86140 C-REACTIVE PROTEIN: CPT | Mod: ZL | Performed by: FAMILY MEDICINE

## 2018-03-02 PROCEDURE — 85025 COMPLETE CBC W/AUTO DIFF WBC: CPT | Mod: ZL | Performed by: FAMILY MEDICINE

## 2018-03-02 PROCEDURE — 85652 RBC SED RATE AUTOMATED: CPT | Mod: ZL | Performed by: FAMILY MEDICINE

## 2018-03-02 PROCEDURE — 71046 X-RAY EXAM CHEST 2 VIEWS: CPT | Mod: TC

## 2018-03-02 PROCEDURE — 99214 OFFICE O/P EST MOD 30 MIN: CPT | Performed by: FAMILY MEDICINE

## 2018-03-02 PROCEDURE — 80048 BASIC METABOLIC PNL TOTAL CA: CPT | Mod: ZL | Performed by: FAMILY MEDICINE

## 2018-03-02 PROCEDURE — 36415 COLL VENOUS BLD VENIPUNCTURE: CPT | Mod: ZL | Performed by: FAMILY MEDICINE

## 2018-03-02 PROCEDURE — G0463 HOSPITAL OUTPT CLINIC VISIT: HCPCS | Mod: 25

## 2018-03-02 PROCEDURE — 84443 ASSAY THYROID STIM HORMONE: CPT | Mod: ZL | Performed by: FAMILY MEDICINE

## 2018-03-02 ASSESSMENT — ANXIETY QUESTIONNAIRES
5. BEING SO RESTLESS THAT IT IS HARD TO SIT STILL: NOT AT ALL
7. FEELING AFRAID AS IF SOMETHING AWFUL MIGHT HAPPEN: NOT AT ALL
2. NOT BEING ABLE TO STOP OR CONTROL WORRYING: NOT AT ALL
4. TROUBLE RELAXING: NOT AT ALL
3. WORRYING TOO MUCH ABOUT DIFFERENT THINGS: NOT AT ALL
6. BECOMING EASILY ANNOYED OR IRRITABLE: NOT AT ALL
1. FEELING NERVOUS, ANXIOUS, OR ON EDGE: NOT AT ALL
GAD7 TOTAL SCORE: 0

## 2018-03-02 ASSESSMENT — PAIN SCALES - GENERAL: PAINLEVEL: NO PAIN (0)

## 2018-03-02 NOTE — PROGRESS NOTES
SUBJECTIVE:                                                    Christiano Leon is a 73 year old male who presents to clinic today for the following health issues:        Fatigue and sweaty      Duration: about a month or longer    Description (location/character/radiation): body sweats    Intensity:  mild    Accompanying signs and symptoms: body sweats have been getting more frequent. Weak, tired and sweaty    History (similar episodes/previous evaluation): None    Precipitating or alleviating factors: None    Therapies tried and outcome: stays in room and turns up the heat     Patient has had some daytime sweating can last 20-25 minutes at a time it is intermittent it is not every day.  He also has had a cough and chest congestion.  No myalgias or fevers nausea vomiting diarrhea or shortness of breath or chest pain.  [facility]    Christiano Leon, 73 year old, male presents with   Chief Complaint   Patient presents with     Fatigue     Sweats       PAST MEDICAL HISTORY:  Past Medical History:   Diagnosis Date     BPH (benign prostatic hyperplasia)      Poor dentition        PAST SURGICAL HISTORY:  Past Surgical History:   Procedure Laterality Date     Bilateral cataract       HERNIA REPAIR Right 2007?     Right knee meniscectomy Right        MEDICATIONS:  Prior to Admission medications    Medication Sig Start Date End Date Taking? Authorizing Provider   levothyroxine (SYNTHROID/LEVOTHROID) 50 MCG tablet TAKE 1 TABLET DAILY BY MOUTH - GENERIC FOR SYNTHROID 2/6/18  Yes ROBERT Akbar MD    MG capsule TAKE 2 CAPSULES (200MG) BY MOUTH TWICE DAILY 12/14/17  Yes ROBERT Akbar MD   Acetaminophen (TYLENOL PO) Take 1,000 mg by mouth every 6 hours as needed for mild pain or fever   Yes Reported, Patient   Cholecalciferol (VITAMIN D-3 PO) Take 1,000 Units by mouth daily   Yes Reported, Patient   ARTIFICIAL TEAR OP Apply 1 drop to eye 4 times daily   Yes Reported, Patient   QUEtiapine Fumarate (SEROQUEL  PO) Take 900 mg by mouth At Bedtime   Yes Reported, Patient       ALLERGIES:     Allergies   Allergen Reactions     Nickel      Possible. Cheap metal.     Penicillins      Seasonal Allergies      Perfumes       ROS:  Constitutional, neuro, ENT, endocrine, pulmonary, cardiac, gastrointestinal, genitourinary, musculoskeletal, integument and psychiatric systems are negative, except as otherwise noted.      EXAM:  /72  Pulse 81  Temp 97  F (36.1  C) (Tympanic)  Wt 168 lb 8 oz (76.4 kg)  SpO2 96%  BMI 23.5 kg/m2 Body mass index is 23.5 kg/(m^2).   GENERAL APPEARANCE: healthy, alert and no distress  EYES: Eyes grossly normal to inspection, PERRL and conjunctivae and sclerae normal  HENT: ear canals and TM's normal and nose and mouth without ulcers or lesions  NECK: no adenopathy, no asymmetry, masses, or scars and thyroid normal to palpation  RESP: lungs clear to auscultation - no rales, rhonchi or wheezes  CV: regular rates and rhythm, normal S1 S2, no S3 or S4 and no murmur, click or rub  ABDOMEN: soft, nontender, without hepatosplenomegaly or masses and bowel sounds normal  NEURO: Normal strength and tone, mentation intact and speech normal  PSYCH: mentation appears to be at baseline.  No delusional thoughts.  He is pleasant no sense of agitation  Lab/ X-ray  Results for orders placed or performed in visit on 03/02/18 (from the past 24 hour(s))   CBC with platelets differential   Result Value Ref Range    WBC 4.7 4.0 - 11.0 10e9/L    RBC Count 4.45 4.4 - 5.9 10e12/L    Hemoglobin 13.5 13.3 - 17.7 g/dL    Hematocrit 40.3 40.0 - 53.0 %    MCV 91 78 - 100 fl    MCH 30.3 26.5 - 33.0 pg    MCHC 33.5 31.5 - 36.5 g/dL    RDW 13.3 10.0 - 15.0 %    Platelet Count 167 150 - 450 10e9/L    Diff Method Automated Method     % Neutrophils 61.5 %    % Lymphocytes 25.8 %    % Monocytes 9.8 %    % Eosinophils 2.1 %    % Basophils 0.4 %    % Immature Granulocytes 0.4 %    Nucleated RBCs 0 0 /100    Absolute Neutrophil 2.9 1.6  - 8.3 10e9/L    Absolute Lymphocytes 1.2 0.8 - 5.3 10e9/L    Absolute Monocytes 0.5 0.0 - 1.3 10e9/L    Absolute Eosinophils 0.1 0.0 - 0.7 10e9/L    Absolute Basophils 0.0 0.0 - 0.2 10e9/L    Abs Immature Granulocytes 0.0 0 - 0.4 10e9/L    Absolute Nucleated RBC 0.0      Date Exam Time Accession # Performing Department Results    3/2/18 10:48 AM [id number] [facility] X[facility]    [employer] Interactive Report and InfoRx   View the interactive report   PACS Images   Show images for XR Chest 2 Views   Study Result   XR CHEST 2 VW     HISTORY: 73 years Male ; Diaphoresis     COMPARISON: 3/26/2011     TECHNIQUE: 2 views of the chest were obtained.     FINDINGS: Two views of the chest were obtained. Heart size and  pulmonary vascularity are within normal limits, lungs are clear both  views. No consolidating air space opacities are present.               IMPRESSION: Clear chest.     ROSETTE GERMAIN MD     MIGUEL-7 SCORE 11/16/2017 1/2/2018 3/2/2018   Total Score 0 0 0     PHQ-9 SCORE 11/16/2017 1/2/2018 3/2/2018   Total Score 0 0 0         ASSESSMENT/PLAN:    ICD-10-CM    1. Dementia with behavioral disturbance, unspecified dementia type F03.91    2. Acquired hypothyroidism E03.9 TSH with free T4 reflex   3. Diaphoresis R61 Basic metabolic panel     CBC with platelets differential     Erythrocyte sedimentation rate auto     CRP inflammation     XR Chest 2 Views     Dementia stable.  Will check a TSH because of his history of hypothyroidism and also this intermittent diaphoresis.  CBC and chest x-ray are normal.  Also check sed rate CRP and BMP.  We will see him in a month sooner if there are problems.  He does not appear ill or toxic today.  If he is doing well in a month we can give him his Prevnar and tetanus and discuss colonoscopy.  Here with caregiver from Raj Akbar MD  March 2, 2018

## 2018-03-02 NOTE — NURSING NOTE
"Chief Complaint   Patient presents with     Fatigue     Sweats       Initial /72  Pulse 81  Temp 97  F (36.1  C) (Tympanic)  Wt 168 lb 8 oz (76.4 kg)  SpO2 96%  BMI 23.5 kg/m2 Estimated body mass index is 23.5 kg/(m^2) as calculated from the following:    Height as of 1/2/18: 5' 11\" (1.803 m).    Weight as of this encounter: 168 lb 8 oz (76.4 kg).  Medication Reconciliation: complete  "

## 2018-03-02 NOTE — MR AVS SNAPSHOT
After Visit Summary   3/2/2018    Christiano Leon    MRN: [id number]           Patient Information     Date Of Birth          1945        Visit Information        Provider Department      3/2/2018 10:30 AM ROBERT Akbar MD [facility]        Today's Diagnoses     Dementia with behavioral disturbance, unspecified dementia type    -  1    Acquired hypothyroidism        Diaphoresis          Care Instructions    We will call with the labs.            Follow-ups after your visit        Follow-up notes from your care team     Return in about 4 weeks (around 3/30/2018).      Your next 10 appointments already scheduled     Apr 03, 2018  1:15 PM CDT   (Arrive by 1:00 PM)   Office Visit with ROBERT Akbar MD   [facility] ([facility] )    [address]  [facility] [address]   [phone]           Bring a current list of meds and any records pertaining to this visit.  For Physicals, please bring immunization records and any forms needing to be filled out.  Please arrive 15 minutes early to complete paperwork and register.              Who to contact     If you have questions or need follow up information about today's clinic visit or your schedule please contact [facility] directly at [phone].  Normal or non-critical lab and imaging results will be communicated to you by MyChart, letter or phone within 4 business days after the clinic has received the results. If you do not hear from us within 7 days, please contact the clinic through MyChart or phone. If you have a critical or abnormal lab result, we will notify you by phone as soon as possible.  Submit refill requests through ODIN or call your pharmacy and they will forward the refill request to us. Please allow 3 business days for your refill to be completed.          Additional Information About Your Visit        ExploraMedharYoujia Information     ODIN lets you send messages to your doctor,  "view your test results, renew your prescriptions, schedule appointments and more. To sign up, go to [email or web address] . Click on \"Log in\" on the left side of the screen, which will take you to the Welcome page. Then click on \"Sign up Now\" on the right side of the page.     You will be asked to enter the access code listed below, as well as some personal information. Please follow the directions to create your username and password.     Your access code is: [email or web address]  Expires: 2018 10:55 AM     Your access code will  in 90 days. If you need help or a new code, please call your [place] clinic or [phone].        Care EveryWhere ID     This is your Care EveryWhere ID. This could be used by other organizations to access your [place] medical records  [id number]        Your Vitals Were     Pulse Temperature Pulse Oximetry BMI (Body Mass Index)          81 97  F (36.1  C) (Tympanic) 96% 23.5 kg/m2         Blood Pressure from Last 3 Encounters:   18 104/72   18 118/68   17 124/84    Weight from Last 3 Encounters:   18 168 lb 8 oz (76.4 kg)   18 165 lb (74.8 kg)   17 165 lb (74.8 kg)              We Performed the Following     Basic metabolic panel     CBC with platelets differential     CRP inflammation     Erythrocyte sedimentation rate auto     TSH with free T4 reflex        Primary Care Provider Office Phone # Fax #    R Dustin Akbar [phone]       [address] [place] [address]        Equal Access to Services     [facility] DULCE : Hadgreta Pedroza, alden ann, stoney don. So [facility] [phone].    ATENCIÓN: Si habla español, tiene a pierce disposición servicios gratuitos de asistencia lingüística. Llame al [phone].    We comply with applicable federal civil rights laws and [place] laws. We do not discriminate on the basis of race, color, national origin, " age, disability, sex, sexual orientation, or gender identity.            Thank you!     Thank you for choosing [facility]  [facility] your [facility]. Our goal is always to provide you with excellent care. Hearing back from our patients is one way we can continue to improve our services. Please take a few minutes to complete the written survey that you may receive in the mail after your visit with us. Thank you!             Your Updated Medication List - Protect others around you: Learn how to safely use, store and throw away your medicines at www.disposemymeds.org.          This list is accurate as of 3/2/18 10:55 AM.  Always use your most recent med list.                   Brand Name Dispense Instructions for use Diagnosis    ARTIFICIAL TEAR OP      Apply 1 drop to eye 4 times daily         MG capsule   Generic drug:  docusate sodium     100 capsule    TAKE 2 CAPSULES (200MG) BY MOUTH TWICE DAILY    Constipation, unspecified constipation type       levothyroxine 50 MCG tablet    SYNTHROID/LEVOTHROID    30 tablet    TAKE 1 TABLET DAILY BY MOUTH - GENERIC FOR SYNTHROID    Hypothyroidism, unspecified type       SEROQUEL PO      Take 900 mg by mouth At Bedtime        TYLENOL PO      Take 1,000 mg by mouth every 6 hours as needed for mild pain or fever        VITAMIN D-3 PO      Take 1,000 Units by mouth daily

## 2018-03-03 ASSESSMENT — ANXIETY QUESTIONNAIRES: GAD7 TOTAL SCORE: 0

## 2018-03-03 ASSESSMENT — PATIENT HEALTH QUESTIONNAIRE - PHQ9: SUM OF ALL RESPONSES TO PHQ QUESTIONS 1-9: 0

## 2018-04-03 ENCOUNTER — OFFICE VISIT (OUTPATIENT)
Dept: FAMILY MEDICINE | Facility: OTHER | Age: 73
End: 2018-04-03
Attending: FAMILY MEDICINE
Payer: MEDICARE

## 2018-04-03 VITALS
TEMPERATURE: 97.3 F | WEIGHT: 168.8 LBS | DIASTOLIC BLOOD PRESSURE: 70 MMHG | HEART RATE: 94 BPM | SYSTOLIC BLOOD PRESSURE: 104 MMHG | HEIGHT: 71 IN | OXYGEN SATURATION: 94 % | BODY MASS INDEX: 23.63 KG/M2

## 2018-04-03 DIAGNOSIS — F03.91 DEMENTIA WITH BEHAVIORAL DISTURBANCE, UNSPECIFIED DEMENTIA TYPE: ICD-10-CM

## 2018-04-03 DIAGNOSIS — E03.9 ACQUIRED HYPOTHYROIDISM: Primary | ICD-10-CM

## 2018-04-03 PROCEDURE — G0463 HOSPITAL OUTPT CLINIC VISIT: HCPCS

## 2018-04-03 PROCEDURE — 99213 OFFICE O/P EST LOW 20 MIN: CPT | Performed by: FAMILY MEDICINE

## 2018-04-03 ASSESSMENT — ANXIETY QUESTIONNAIRES
2. NOT BEING ABLE TO STOP OR CONTROL WORRYING: NOT AT ALL
GAD7 TOTAL SCORE: 0
1. FEELING NERVOUS, ANXIOUS, OR ON EDGE: NOT AT ALL
5. BEING SO RESTLESS THAT IT IS HARD TO SIT STILL: NOT AT ALL
3. WORRYING TOO MUCH ABOUT DIFFERENT THINGS: NOT AT ALL
4. TROUBLE RELAXING: NOT AT ALL
6. BECOMING EASILY ANNOYED OR IRRITABLE: NOT AT ALL
7. FEELING AFRAID AS IF SOMETHING AWFUL MIGHT HAPPEN: NOT AT ALL

## 2018-04-03 ASSESSMENT — PAIN SCALES - GENERAL: PAINLEVEL: NO PAIN (0)

## 2018-04-03 NOTE — NURSING NOTE
"Chief Complaint   Patient presents with     Thyroid Problem       Initial /70  Pulse 94  Temp 97.3  F (36.3  C)  Ht 5' 11\" (1.803 m)  Wt 168 lb 12.8 oz (76.6 kg)  SpO2 94%  BMI 23.54 kg/m2 Estimated body mass index is 23.54 kg/(m^2) as calculated from the following:    Height as of this encounter: 5' 11\" (1.803 m).    Weight as of this encounter: 168 lb 12.8 oz (76.6 kg).  Medication Reconciliation: complete  [facility]   Medical Assistant    "

## 2018-04-03 NOTE — MR AVS SNAPSHOT
After Visit Summary   4/3/2018    Christiano Leon    MRN: [id number]           Patient Information     Date Of Birth          1945        Visit Information        Provider Department      4/3/2018 1:15 PM ROBERT Akbar MD [facility]         Follow-ups after your visit        Follow-up notes from your care team     Return in about 6 months (around 10/3/2018).      Your next 10 appointments already scheduled     Apr 03, 2018  1:15 PM CDT   (Arrive by 1:00 PM)   Office Visit with ROBERT Akbar MD   [facility] ([facility] )    [address]  [place] [address] [phone]           Bring a current list of meds and any records pertaining to this visit.  For Physicals, please bring immunization records and any forms needing to be filled out.  Please arrive 15 minutes early to complete paperwork and register.            Oct 04, 2018  1:15 PM CDT   (Arrive by 1:00 PM)   Office Visit with ROBERT Akbar MD   [facility] [place] ([facility] [place] [facility] [place] )    [address]  [place] [address]   [phone]           Bring a current list of meds and any records pertaining to this visit.  For Physicals, please bring immunization records and any forms needing to be filled out.  Please arrive 15 minutes early to complete paperwork and register.              Who to contact     If you have questions or need follow up information about today's clinic visit or your schedule please contact [facility] directly at [phone].  Normal or non-critical lab and imaging results will be communicated to you by MyChart, letter or phone within 4 business days after the clinic has received the results. If you do not hear from us within 7 days, please contact the clinic through MyChart or phone. If you have a critical or abnormal lab result, we will notify you by phone as soon as possible.  Submit refill requests through [employer] or call your  ----- Message from Julio César Lackey MD sent at 11/16/2017  2:00 PM CST -----  Contact: Wife- Anne- [phone]  ok  ----- Message -----  From: Cee Nichols MA  Sent: 11/16/2017   1:56 PM  To: Julio César Lackey MD        ----- Message -----  From: Estelle Hadley  Sent: 11/16/2017   1:45 PM  To: Ziyad Lackey- pts wife called and stated she won't be able to call into the office until 11/22 to get test results     "pharmacy and they will forward the refill request to us. Please allow 3 business days for your refill to be completed.          Additional Information About Your Visit        MyChart Information     VetteryharNewslines lets you send messages to your doctor, view your test results, renew your prescriptions, schedule appointments and more. To sign up, go to [email or web address] . Click on \"Log in\" on the left side of the screen, which will take you to the Welcome page. Then click on \"Sign up Now\" on the right side of the page.     You will be asked to enter the access code listed below, as well as some personal information. Please follow the directions to create your username and password.     Your access code is: [email or web address]  Expires: 2018 11:55 AM     Your access code will  in 90 days. If you need help or a new code, please call your [place] clinic or [phone].        Care EveryWhere ID     This is your Care EveryWhere ID. This could be used by other organizations to access your [place] medical records  [id number]        Your Vitals Were     Pulse Temperature Height Pulse Oximetry BMI (Body Mass Index)       94 97.3  F (36.3  C) 5' 11\" (1.803 m) 94% 23.54 kg/m2        Blood Pressure from Last 3 Encounters:   18 104/70   18 104/72   18 118/68    Weight from Last 3 Encounters:   18 168 lb 12.8 oz (76.6 kg)   18 168 lb 8 oz (76.4 kg)   18 165 lb (74.8 kg)              Today, you had the following     No orders found for display       Primary Care Provider Office Phone # Fax #    R Dustin Akbar [phone] [phone]        Equal Access to Services     KATELYN CARDONA : Keyona Pedroza, alden ann, stoney don. [facility] [phone].    ATENCIÓN: Si habla español, tiene a pierce disposición servicios gratuitos de asistencia lingüística. Llame al " [phone].    We comply with applicable federal civil rights laws and [place] laws. We do not discriminate on the basis of race, color, national origin, age, disability, sex, sexual orientation, or gender identity.            Thank you!     Thank you for choosing [facility]  for your care. Our goal is always to provide you with excellent care. Hearing back from our patients is one way we can continue to improve our services. Please take a few minutes to complete the written survey that you may receive in the mail after your visit with us. Thank you!             Your Updated Medication List - Protect others around you: Learn how to safely use, store and throw away your medicines at www.disposemymeds.org.          This list is accurate as of 4/3/18  1:01 PM.  Always use your most recent med list.                   Brand Name Dispense Instructions for use Diagnosis    ARTIFICIAL TEAR OP      Apply 1 drop to eye 4 times daily         MG capsule   Generic drug:  docusate sodium     100 capsule    TAKE 2 CAPSULES (200MG) BY MOUTH TWICE DAILY    Constipation, unspecified constipation type       levothyroxine 50 MCG tablet    SYNTHROID/LEVOTHROID    30 tablet    TAKE 1 TABLET DAILY BY MOUTH - GENERIC FOR SYNTHROID    Hypothyroidism, unspecified type       SEROQUEL PO      Take 900 mg by mouth At Bedtime        TYLENOL PO      Take 1,000 mg by mouth every 6 hours as needed for mild pain or fever        VITAMIN D-3 PO      Take 1,000 Units by mouth daily

## 2018-04-03 NOTE — PROGRESS NOTES
SUBJECTIVE:   Christiano Leon is a 73 year old male who presents to clinic today for the following health issues:      Hypothyroidism Follow-up      Since last visit, patient describes the following symptoms: Weight stable, no hair loss, no skin changes, no constipation, no loose stools      Amount of exercise or physical activity: Does a lot of walking    Everyday.    Problems taking medications regularly: No    Medication side effects: none    Diet: regular (no restrictions)        He states he was told he had an inguinal hernia on the right in the past and wants it checked.  No flareups of problems.  Also states had problems with diaphoresis earlier but that has resolved    Problem list and histories reviewed & adjusted, as indicated.  Additional history: as documented    Patient Active Problem List   Diagnosis     ACP (advance care planning)     Acquired hypothyroidism     Dementia with behavioral disturbance, unspecified dementia type     Alcohol use disorder (H)     Past Surgical History:   Procedure Laterality Date     Bilateral cataract       HERNIA REPAIR Right 2007?     Right knee meniscectomy Right        Social History   Substance Use Topics     Smoking status: Former Smoker     Smokeless tobacco: Never Used     Alcohol use No     History reviewed. No pertinent family history.      Current Outpatient Prescriptions   Medication Sig Dispense Refill     levothyroxine (SYNTHROID/LEVOTHROID) 50 MCG tablet TAKE 1 TABLET DAILY BY MOUTH - GENERIC FOR SYNTHROID 30 tablet 3      MG capsule TAKE 2 CAPSULES (200MG) BY MOUTH TWICE DAILY 100 capsule 11     Acetaminophen (TYLENOL PO) Take 1,000 mg by mouth every 6 hours as needed for mild pain or fever       Cholecalciferol (VITAMIN D-3 PO) Take 1,000 Units by mouth daily       ARTIFICIAL TEAR OP Apply 1 drop to eye 4 times daily       QUEtiapine Fumarate (SEROQUEL PO) Take 900 mg by mouth At Bedtime       Allergies   Allergen Reactions     Nickel      Possible.  "Cheap metal.     Penicillins      Seasonal Allergies      Perfumes     BP Readings from Last 3 Encounters:   04/03/18 104/70   03/02/18 104/72   01/02/18 118/68    Wt Readings from Last 3 Encounters:   04/03/18 168 lb 12.8 oz (76.6 kg)   03/02/18 168 lb 8 oz (76.4 kg)   01/02/18 165 lb (74.8 kg)                    Reviewed and updated as needed this visit by clinical staff  Tobacco  Allergies  Meds  Med Hx  Surg Hx  Fam Hx  Soc Hx      Reviewed and updated as needed this visit by Provider         ROS:  Constitutional, HEENT, cardiovascular, pulmonary, gi and gu systems are negative, except as otherwise noted.    OBJECTIVE:     /70  Pulse 94  Temp 97.3  F (36.3  C)  Ht 5' 11\" (1.803 m)  Wt 168 lb 12.8 oz (76.6 kg)  SpO2 94%  BMI 23.54 kg/m2  Body mass index is 23.54 kg/(m^2).  GENERAL: healthy, alert and no distress  EYES: Eyes grossly normal to inspection, PERRL and conjunctivae and sclerae normal  NECK: no adenopathy, no asymmetry, masses, or scars and thyroid normal to palpation  RESP: lungs clear to auscultation - no rales, rhonchi or wheezes  CV: regular rate and rhythm, normal S1 S2, no S3 or S4, no murmur, click or rub, no peripheral edema and peripheral pulses strong  ABDOMEN: soft, nontender, no hepatosplenomegaly, no masses and bowel sounds normal   (male): normal male genitalia without lesions or urethral discharge, no hernia    Diagnostic Test Results:  Results for orders placed or performed in visit on 03/02/18   XR Chest 2 Views    Narrative    XR CHEST 2 VW    HISTORY: 73 years Male ; Diaphoresis    COMPARISON: 3/26/2011    TECHNIQUE: 2 views of the chest were obtained.    FINDINGS: Two views of the chest were obtained. Heart size and  pulmonary vascularity are within normal limits, lungs are clear both  views. No consolidating air space opacities are present.          Impression    IMPRESSION: Clear chest.    ROSETTE GERMAIN MD     3/2/18 10:42 AM [id number]    Component Results "   Component Value Flag Ref Range Units Status Collected Lab   TSH 1.26  0.40 - 4.00 mU/L Final 03/02/2018       Exam Date Exam Time Accession # Results    3/2/18 10:42 AM [id number]    Component Results   Component Value Flag Ref Range Units Status Collected Lab   CRP Inflammation 4.4  0.0 - 8.0 mg/L Final 03/02/2018 10:42 AM      Exam Date Exam Time Accession # Results    3/2/18 10:42 AM [id number]    Component Results   Component Value Flag Ref Range Units Status Collected Lab   Sed Rate 11  0 - 20 mm/h Final 03/02/2018 10:42 AM HI     Exam Date Exam Time Accession # Results    3/2/18 10:42 AM [id number]    Component Results   Component Value Flag Ref Range Units Status Collected Lab   WBC 4.7  4.0 - 11.0 10e9/L Final 03/02/2018 10:42 AM HI   RBC Count 4.45  4.4 - 5.9 10e12/L Final 03/02/2018 10:42 AM HI   Hemoglobin 13.5  13.3 - 17.7 g/dL Final 03/02/2018 10:42 AM HI   Hematocrit 40.3  40.0 - 53.0 % Final 03/02/2018 10:42 AM HI   MCV 91  78 - 100 fl Final 03/02/2018 10:42 AM HI   MCH 30.3  26.5 - 33.0 pg Final 03/02/2018 10:42 AM HI   MCHC 33.5  31.5 - 36.5 g/dL Final 03/02/2018 10:42 AM HI   RDW 13.3  10.0 - 15.0 % Final 03/02/2018 10:42 AM HI   Platelet Count 167  150 - 450 10e9/L Final 03/02/2018 10:42 AM HI   Diff Method     Final 03/02/2018 10:42 AM HI   Automated Method   % Neutrophils 61.5   % Final 03/02/2018 10:42 AM HI   % Lymphocytes 25.8   % Final 03/02/2018 10:42 AM HI   % Monocytes 9.8   % Final 03/02/2018 10:42 AM HI   % Eosinophils 2.1   % Final 03/02/2018 10:42 AM HI   % Basophils 0.4   % Final 03/02/2018 10:42 AM HI   % Immature Granulocytes 0.4   % Final 03/02/2018 10:42 AM HI   Nucleated RBCs 0  0 /100 Final 03/02/2018 10:42 AM HI   Absolute Neutrophil 2.9  1.6 - 8.3 10e9/L Final 03/02/2018 10:42 AM HI   Absolute Lymphocytes 1.2  0.8 - 5.3 10e9/L Final 03/02/2018 10:42 AM HI   Absolute Monocytes 0.5  0.0 - 1.3 10e9/L Final 03/02/2018 10:42 AM HI   Absolute Eosinophils 0.1   0.0 - 0.7 10e9/L Final 03/02/2018 10:42 AM HI   Absolute Basophils 0.0  0.0 - 0.2 10e9/L Final 03/02/2018 10:42 AM HI   Abs Immature Granulocytes 0.0  0 - 0.4 10e9/L Final 03/02/2018 10:42 AM HI   Absolute Nucleated RBC 0.0    Final 03/02/2018 10:42 AM HI     3/2/18 10:42 AM [id number]    Component Results   Component Value Flag Ref Range Units Status Collected Lab   Sodium 143  133 - 144 mmol/L Final 03/02/2018 10:42 AM HI   Potassium 3.8  3.4 - 5.3 mmol/L Final 03/02/2018 10:42 AM HI   Chloride 111 (H) 94 - 109 mmol/L Final 03/02/2018 10:42 AM HI   Carbon Dioxide 25  20 - 32 mmol/L Final 03/02/2018 10:42 AM HI   Anion Gap 7  3 - 14 mmol/L Final 03/02/2018 10:42 AM HI   Glucose 119 (H) 70 - 99 mg/dL Final 03/02/2018 10:42 AM HI   Urea Nitrogen 16  7 - 30 mg/dL Final 03/02/2018 10:42 AM HI   Creatinine 1.03  0.66 - 1.25 mg/dL Final 03/02/2018 10:42 AM HI   GFR Estimate 70  >60 mL/min/1.7m2 Final 03/02/2018 10:42 AM HI   Comment:   Non  GFR Calc   GFR Estimate If Black 85  >60 mL/min/1.7m2 Final 03/02/2018 10:42 AM HI   Comment:   African American GFR Calc   Calcium 8.3 (L) 8.5 - 10.1 mg/dL Final 03/02/2018 10:42 AM HI     MIGUEL-7 SCORE 11/16/2017 1/2/2018 3/2/2018   Total Score 0 0 0     PHQ-9 SCORE 11/16/2017 1/2/2018 3/2/2018   Total Score 0 0 0                 ASSESSMENT/PLAN:               ICD-10-CM    1. Acquired hypothyroidism E03.9    2. Dementia with behavioral disturbance, unspecified dementia type F03.91        Recent lab work is stable.  He seems to be doing well.  Reassured him there is no sign of hernia.  We will see him in 6 months sooner if there are problems.  Here with caregiver from [place].  PHQ 9 and MIGUEL are both 0    R Dustin Akbar MD  [facility]

## 2018-04-04 ASSESSMENT — PATIENT HEALTH QUESTIONNAIRE - PHQ9: SUM OF ALL RESPONSES TO PHQ QUESTIONS 1-9: 0

## 2018-04-04 ASSESSMENT — ANXIETY QUESTIONNAIRES: GAD7 TOTAL SCORE: 0

## 2018-04-30 DIAGNOSIS — E55.9 VITAMIN D DEFICIENCY: ICD-10-CM

## 2018-05-01 RX ORDER — CHOLECALCIFEROL (VITAMIN D3) 25 MCG
TABLET ORAL
Qty: 100 TABLET | Refills: 3 | Status: SHIPPED | OUTPATIENT
Start: 2018-05-01 | End: 2019-05-14

## 2018-05-25 NOTE — PROGRESS NOTES
SUBJECTIVE:   Christiano Leon is a 73 year old male who presents to clinic today for the following health issues:      Colon Cancer Screen      Duration: n/a    Description (location/character/radiation): wants to discuss colon screen options     Intensity:  0/10    Accompanying signs and symptoms: none     History (similar episodes/previous evaluation): no family or personal history of colon cancer     Precipitating or alleviating factors: None    Therapies tried and outcome: patient states has had  A colonoscopy done in the past in 2007 at [facility].     Patient also had lipids done in 2012 and the LDL was under 100.  He is not on any lipid-lowering agent       [facility]    Christiano Leon, 73 year old, male presents with   Chief Complaint   Patient presents with     Colonoscopy     Panel Management     lipid, prevnar, TD, colonscopy        PAST MEDICAL HISTORY:  Past Medical History:   Diagnosis Date     BPH (benign prostatic hyperplasia)      Poor dentition        PAST SURGICAL HISTORY:  Past Surgical History:   Procedure Laterality Date     Bilateral cataract       HERNIA REPAIR Right 2007?     Right knee meniscectomy Right        MEDICATIONS:  Prior to Admission medications    Medication Sig Start Date End Date Taking? Authorizing Provider   Acetaminophen (TYLENOL PO) Take 1,000 mg by mouth every 6 hours as needed for mild pain or fever   Yes Reported, Patient   ARTIFICIAL TEAR OP Apply 1 drop to eye 4 times daily   Yes Reported, Patient   Cholecalciferol (VITAMIN D-3 PO) Take 1,000 Units by mouth daily   Yes Reported, Patient    MG capsule TAKE 2 CAPSULES (200MG) BY MOUTH TWICE DAILY 12/14/17  Yes ROBERT Akbar MD   levothyroxine (SYNTHROID/LEVOTHROID) 50 MCG tablet TAKE 1 TABLET DAILY BY MOUTH - GENERIC FOR SYNTHROID 2/6/18  Yes ROBERT Akbar MD   QUEtiapine Fumarate (SEROQUEL PO) Take 900 mg by mouth At Bedtime   Yes Reported, Patient   VITAMIN D3 1000 units tablet TAKE 1 TABLET BY  MOUTH ONCE DAILY 5/1/18  Yes ROBERT Akbar MD       ALLERGIES:     Allergies   Allergen Reactions     Nickel      Possible. Cheap metal.     Penicillins      Seasonal Allergies      Perfumes       ROS:  Constitutional, HEENT, cardiovascular, pulmonary, gi and gu systems are negative, except as otherwise noted.      EXAM:  /64 (BP Location: Right arm, Patient Position: Chair, Cuff Size: Adult Large)  Pulse 75  Temp 97.9  F (36.6  C) (Tympanic)  Wt 168 lb (76.2 kg)  SpO2 95%  BMI 23.43 kg/m2 Body mass index is 23.43 kg/(m^2).   GENERAL APPEARANCE: healthy, alert and no distress  EYES: Eyes grossly normal to inspection, PERRL and conjunctivae and sclerae normal  RESP: lungs clear to auscultation - no rales, rhonchi or wheezes  CV: regular rates and rhythm, normal S1 S2, no S3 or S4 and no murmur, click or rub  ABDOMEN: soft, nontender, without hepatosplenomegaly or masses and bowel sounds normal  Lab/ X-ray  No results found for this or any previous visit (from the past 24 hour(s)).    ASSESSMENT/PLAN:    ICD-10-CM    1. Screening for lipoid disorders Z13.220 Lipid Profile   2. Special screening for malignant neoplasms, colon Z12.11 Immunos occult blood   3. Dementia with behavioral disturbance, unspecified dementia type F03.91      Patient is a resident at [facility] here with his staff member.  Discussed options and he would like to do the IFOB card.  He has eaten today so will come in fasting in the morning for  lipids.  Will call with results.  His memory issues seem to be stable    MALVIN Akbar MD  May 29, 2018

## 2018-05-29 ENCOUNTER — OFFICE VISIT (OUTPATIENT)
Dept: FAMILY MEDICINE | Facility: OTHER | Age: 73
End: 2018-05-29
Attending: FAMILY MEDICINE
Payer: MEDICARE

## 2018-05-29 VITALS
BODY MASS INDEX: 23.43 KG/M2 | HEART RATE: 75 BPM | SYSTOLIC BLOOD PRESSURE: 108 MMHG | TEMPERATURE: 97.9 F | WEIGHT: 168 LBS | OXYGEN SATURATION: 95 % | DIASTOLIC BLOOD PRESSURE: 64 MMHG

## 2018-05-29 DIAGNOSIS — Z12.11 SPECIAL SCREENING FOR MALIGNANT NEOPLASMS, COLON: ICD-10-CM

## 2018-05-29 DIAGNOSIS — F03.91 DEMENTIA WITH BEHAVIORAL DISTURBANCE, UNSPECIFIED DEMENTIA TYPE: ICD-10-CM

## 2018-05-29 DIAGNOSIS — Z13.220 SCREENING FOR LIPOID DISORDERS: Primary | ICD-10-CM

## 2018-05-29 PROCEDURE — 99214 OFFICE O/P EST MOD 30 MIN: CPT | Performed by: FAMILY MEDICINE

## 2018-05-29 PROCEDURE — G0463 HOSPITAL OUTPT CLINIC VISIT: HCPCS

## 2018-05-29 ASSESSMENT — PAIN SCALES - GENERAL: PAINLEVEL: NO PAIN (0)

## 2018-05-29 NOTE — NURSING NOTE
"Chief Complaint   Patient presents with     Colonoscopy     Panel Management     lipid, prevnar, TD, colonscopy        Initial /64 (BP Location: Right arm, Patient Position: Chair, Cuff Size: Adult Large)  Pulse 75  Temp 97.9  F (36.6  C) (Tympanic)  Wt 168 lb (76.2 kg)  SpO2 95%  BMI 23.43 kg/m2 Estimated body mass index is 23.43 kg/(m^2) as calculated from the following:    Height as of 4/3/18: 5' 11\" (1.803 m).    Weight as of this encounter: 168 lb (76.2 kg).  Medication Reconciliation: complete    Sheila Brizuela MA    "

## 2018-05-29 NOTE — MR AVS SNAPSHOT
After Visit Summary   5/29/2018    Christiano Leon    MRN: [id number]           Patient Information     Date Of Birth          1945        Visit Information        Provider Department      5/29/2018 2:30 PM ROBERT Akbar MD [facility]        Today's Diagnoses     Screening for lipoid disorders    -  1    Special screening for malignant neoplasms, colon           Follow-ups after your visit        Your next 10 appointments already scheduled     May 29, 2018  2:30 PM CDT   (Arrive by 2:15 PM)   SHORT with ROBERT Akbar MD   [facility] ([facility] [place] )    [address]  [place] [address]   [phone]            Oct 04, 2018  8:15 AM CDT   (Arrive by 8:00 AM)   Office Visit with ROBERT Akbar MD   [facility] ([facility] [place] )    [address]  [place] [address]   [phone]           Bring a current list of meds and any records pertaining to this visit.  For Physicals, please bring immunization records and any forms needing to be filled out.  Please arrive 15 minutes early to complete paperwork and register.            Oct 04, 2018  1:15 PM CDT   (Arrive by 1:00 PM)   Office Visit with ROBERT Akbar MD   [facility] ([facility] [place] )    [address]  [place] [address]   [phone]           Bring a current list of meds and any records pertaining to this visit.  For Physicals, please bring immunization records and any forms needing to be filled out.  Please arrive 15 minutes early to complete paperwork and register.              Future tests that were ordered for you today     Open Future Orders        Priority Expected Expires Ordered    Lipid Profile Routine 5/30/2018 5/29/2019 5/29/2018    Immunos occult blood Routine  5/29/2019 5/29/2018            Who to contact     If you have questions or need follow up information about today's clinic visit or your schedule please  contact [facility] directly at [phone].  Normal or non-critical lab and imaging results will be communicated to you by MyChart, letter or phone within 4 business days after the clinic has received the results. If you do not hear from us within 7 days, please contact the clinic through MyChart or phone. If you have a critical or abnormal lab result, we will notify you by phone as soon as possible.  Submit refill requests through [employer] or call your pharmacy and they will forward the refill request to us. Please allow 3 business days for your refill to be completed.          Additional Information About Your Visit        Care EveryWhere ID     This is your Care EveryWhere ID. This could be used by other organizations to access your [facility] medical records  [id number]        Your Vitals Were     Pulse Temperature Pulse Oximetry BMI (Body Mass Index)          75 97.9  F (36.6  C) (Tympanic) 95% 23.43 kg/m2         Blood Pressure from Last 3 Encounters:   05/29/18 108/64   04/03/18 104/70   03/02/18 104/72    Weight from Last 3 Encounters:   05/29/18 168 lb (76.2 kg)   04/03/18 168 lb 12.8 oz (76.6 kg)   03/02/18 168 lb 8 oz (76.4 kg)               Primary Care Provider Office Phone # Fax #    R Dustin Akbar [phone] [address] [id number]        Equal Access to Services     [facility] AH: Hadii aad ku hadasho Soomaali, waaxda luqadaha, qaybta kaalmada adeegyada, stoney roberson . So [facility] [phone].    ATENCIÓN: Si habla español, tiene a pierce disposición servicios gratuitos de asistencia lingüística. Llame al [phone].    We comply with applicable federal civil rights laws and [place] laws. We do not discriminate on the basis of race, color, national origin, age, disability, sex, sexual orientation, or gender identity.            Thank you!     Thank you for choosing [facility]  for your care. Our goal is  always to provide you with excellent care. Hearing back from our patients is one way we can continue to improve our services. Please take a few minutes to complete the written survey that you may receive in the mail after your visit with us. Thank you!             Your Updated Medication List - Protect others around you: Learn how to safely use, store and throw away your medicines at www.disposemymeds.org.          This list is accurate as of 5/29/18  2:29 PM.  Always use your most recent med list.                   Brand Name Dispense Instructions for use Diagnosis    ARTIFICIAL TEAR OP      Apply 1 drop to eye 4 times daily         MG capsule   Generic drug:  docusate sodium     100 capsule    TAKE 2 CAPSULES (200MG) BY MOUTH TWICE DAILY    Constipation, unspecified constipation type       levothyroxine 50 MCG tablet    SYNTHROID/LEVOTHROID    30 tablet    TAKE 1 TABLET DAILY BY MOUTH - GENERIC FOR SYNTHROID    Hypothyroidism, unspecified type       SEROQUEL PO      Take 900 mg by mouth At Bedtime        TYLENOL PO      Take 1,000 mg by mouth every 6 hours as needed for mild pain or fever        * VITAMIN D-3 PO      Take 1,000 Units by mouth daily        * cholecalciferol 1000 UNIT tablet    vitamin D3    100 tablet    TAKE 1 TABLET BY MOUTH ONCE DAILY    Vitamin D deficiency       * Notice:  This list has 2 medication(s) that are the same as other medications prescribed for you. Read the directions carefully, and ask your doctor or other care provider to review them with you.

## 2018-06-04 ENCOUNTER — TELEPHONE (OUTPATIENT)
Dept: FAMILY MEDICINE | Facility: OTHER | Age: 73
End: 2018-06-04

## 2018-06-04 DIAGNOSIS — Z13.220 SCREENING FOR LIPOID DISORDERS: ICD-10-CM

## 2018-06-04 DIAGNOSIS — E78.5 HYPERLIPIDEMIA LDL GOAL <100: Primary | ICD-10-CM

## 2018-06-04 DIAGNOSIS — Z12.11 SPECIAL SCREENING FOR MALIGNANT NEOPLASMS, COLON: ICD-10-CM

## 2018-06-04 LAB
CHOLEST SERPL-MCNC: 164 MG/DL
HDLC SERPL-MCNC: 39 MG/DL
HEMOCCULT SP1 STL QL: NEGATIVE
LDLC SERPL CALC-MCNC: 96 MG/DL
NONHDLC SERPL-MCNC: 125 MG/DL
TRIGL SERPL-MCNC: 147 MG/DL

## 2018-06-04 PROCEDURE — 82274 ASSAY TEST FOR BLOOD FECAL: CPT | Performed by: FAMILY MEDICINE

## 2018-06-04 PROCEDURE — 36415 COLL VENOUS BLD VENIPUNCTURE: CPT | Mod: ZL | Performed by: FAMILY MEDICINE

## 2018-06-04 PROCEDURE — 80061 LIPID PANEL: CPT | Mod: ZL | Performed by: FAMILY MEDICINE

## 2018-06-04 RX ORDER — SIMVASTATIN 40 MG
40 TABLET ORAL AT BEDTIME
Qty: 90 TABLET | Refills: 3 | Status: SHIPPED | OUTPATIENT
Start: 2018-06-04 | End: 2019-05-21

## 2018-06-04 NOTE — TELEPHONE ENCOUNTER
Staff notified of lab results, they state he would be fine starting medication- Lipitor. Please send in medication.  TERRENCE MARTÍNEZ

## 2018-06-18 DIAGNOSIS — E03.9 HYPOTHYROIDISM, UNSPECIFIED TYPE: ICD-10-CM

## 2018-06-18 RX ORDER — LEVOTHYROXINE SODIUM 50 UG/1
TABLET ORAL
Qty: 30 TABLET | Refills: 8 | Status: SHIPPED | OUTPATIENT
Start: 2018-06-18 | End: 2019-03-14

## 2018-06-26 ENCOUNTER — TELEPHONE (OUTPATIENT)
Dept: FAMILY MEDICINE | Facility: OTHER | Age: 73
End: 2018-06-26

## 2018-06-26 NOTE — TELEPHONE ENCOUNTER
Approval on [employer] certification of need for special transportation. #[id number]. Forms scanned to chart. Faxed to [employer].  Allison Tom LPN

## 2018-08-07 NOTE — PROGRESS NOTES
SUBJECTIVE:   Christiano Leon is a 73 year old male who presents to clinic today for the following health issues:      Musculoskeletal problem/pain      Duration: Several years     Description  Location: right shoulder     Intensity:  moderate, severe    Accompanying signs and symptoms: grinding feeling    History  Previous similar problem: no   Previous evaluation:  none    Precipitating or alleviating factors:  Trauma or overuse: YES- injury 4 years ago  Aggravating factors include: lifting and overuse    Therapies tried and outcome: acetaminophen and hot shower  Did have x-ray in the past none recently.  No acute injury  Hearing Problem      Duration: Years     Description (location/character/radiation): difficulty hearing, feels plugged, bilateral ears, worse on the right side     Intensity:  moderate    Accompanying signs and symptoms: ringing in the ears     History (similar episodes/previous evaluation): None    Precipitating or alleviating factors: None    Therapies tried and outcome: None     More fullness in left ear.  He has had this for years is wondering if he needs hearing aids        PAST MEDICAL HISTORY:  Past Medical History:   Diagnosis Date     BPH (benign prostatic hyperplasia)      Poor dentition        PAST SURGICAL HISTORY:  Past Surgical History:   Procedure Laterality Date     Bilateral cataract       HERNIA REPAIR Right 2007?     Right knee meniscectomy Right        MEDICATIONS:  Prior to Admission medications    Medication Sig Start Date End Date Taking? Authorizing Provider   Acetaminophen (TYLENOL PO) Take 1,000 mg by mouth every 6 hours as needed for mild pain or fever   Yes Reported, Patient   ARTIFICIAL TEAR OP Apply 1 drop to eye 4 times daily   Yes Reported, Patient   Cholecalciferol (VITAMIN D-3 PO) Take 1,000 Units by mouth daily   Yes Reported, Patient    MG capsule TAKE 2 CAPSULES (200MG) BY MOUTH TWICE DAILY 12/14/17  Yes ROBERT Akbar MD   levothyroxine  "(SYNTHROID/LEVOTHROID) 50 MCG tablet TAKE 1 TABLET DAILY BY MOUTH - GENERIC FOR SYNTHROID 6/18/18  Yes ROBERT Akbar MD   QUEtiapine Fumarate (SEROQUEL PO) Take 900 mg by mouth At Bedtime   Yes Reported, Patient   simvastatin (ZOCOR) 40 MG tablet Take 1 tablet (40 mg) by mouth At Bedtime 6/4/18  Yes Modesta Akbar MD   VITAMIN D3 1000 units tablet TAKE 1 TABLET BY MOUTH ONCE DAILY 5/1/18  Yes ROBERT Akbar MD       ALLERGIES:     Allergies   Allergen Reactions     Nickel      Possible. Cheap metal.     Penicillins      Seasonal Allergies      Perfumes       ROS:  Constitutional, HEENT, cardiovascular, pulmonary, gi and gu systems are negative, except as otherwise noted.      EXAM:  /68 (BP Location: Right arm, Patient Position: Chair, Cuff Size: Adult Large)  Pulse 73  Temp 97.3  F (36.3  C) (Tympanic)  Ht 5' 11\" (1.803 m)  Wt 164 lb (74.4 kg)  SpO2 95%  BMI 22.87 kg/m2 Body mass index is 22.87 kg/(m^2).   GENERAL APPEARANCE: healthy, alert and no distress  EYES: Eyes grossly normal to inspection, PERRL and conjunctivae and sclerae normal  HENT: Right TM is  normal , left cannal has cerumen, and nose and mouth without ulcers or lesions  NECK: no adenopathy, no asymmetry, masses, or scars and thyroid normal to palpation  RESP: lungs clear to auscultation - no rales, rhonchi or wheezes  CV: regular rates and rhythm, normal S1 S2, no S3 or S4 and no murmur, click or rub  ORTHO: tenderness right shoulder anteriorly, good ROM  Lab/ X-ray  X-ray right shoulder is negative    ASSESSMENT/PLAN:    ICD-10-CM    1. Chronic right shoulder pain M25.511 XR Shoulder Right G/E 3 Views    G89.29    2. Impacted cerumen of left ear H61.22    Left TM was not visualized because of cerumen impaction.  Irrigation attempted in the office without resolution.  Will refer to ENT for removal of the cerumen and then evaluation for hearing aid.  The right shoulder pain is had a chronically will refer to Ortho for " evaluation      R. Dustin Akbar MD  August 9, 2018

## 2018-08-09 ENCOUNTER — RADIANT APPOINTMENT (OUTPATIENT)
Dept: GENERAL RADIOLOGY | Facility: OTHER | Age: 73
End: 2018-08-09
Attending: FAMILY MEDICINE
Payer: MEDICARE

## 2018-08-09 ENCOUNTER — TELEPHONE (OUTPATIENT)
Dept: FAMILY MEDICINE | Facility: OTHER | Age: 73
End: 2018-08-09

## 2018-08-09 ENCOUNTER — OFFICE VISIT (OUTPATIENT)
Dept: FAMILY MEDICINE | Facility: OTHER | Age: 73
End: 2018-08-09
Attending: FAMILY MEDICINE
Payer: MEDICARE

## 2018-08-09 VITALS
HEIGHT: 71 IN | HEART RATE: 73 BPM | TEMPERATURE: 97.3 F | BODY MASS INDEX: 22.96 KG/M2 | SYSTOLIC BLOOD PRESSURE: 102 MMHG | DIASTOLIC BLOOD PRESSURE: 68 MMHG | WEIGHT: 164 LBS | OXYGEN SATURATION: 95 %

## 2018-08-09 DIAGNOSIS — G89.29 CHRONIC RIGHT SHOULDER PAIN: ICD-10-CM

## 2018-08-09 DIAGNOSIS — M25.511 CHRONIC RIGHT SHOULDER PAIN: Primary | ICD-10-CM

## 2018-08-09 DIAGNOSIS — M25.511 CHRONIC RIGHT SHOULDER PAIN: ICD-10-CM

## 2018-08-09 DIAGNOSIS — H61.22 IMPACTED CERUMEN OF LEFT EAR: ICD-10-CM

## 2018-08-09 DIAGNOSIS — G89.29 CHRONIC RIGHT SHOULDER PAIN: Primary | ICD-10-CM

## 2018-08-09 PROCEDURE — 73030 X-RAY EXAM OF SHOULDER: CPT | Mod: TC,RT

## 2018-08-09 PROCEDURE — 99213 OFFICE O/P EST LOW 20 MIN: CPT | Performed by: FAMILY MEDICINE

## 2018-08-09 ASSESSMENT — ANXIETY QUESTIONNAIRES
1. FEELING NERVOUS, ANXIOUS, OR ON EDGE: NOT AT ALL
7. FEELING AFRAID AS IF SOMETHING AWFUL MIGHT HAPPEN: NOT AT ALL
5. BEING SO RESTLESS THAT IT IS HARD TO SIT STILL: NOT AT ALL
6. BECOMING EASILY ANNOYED OR IRRITABLE: NOT AT ALL
GAD7 TOTAL SCORE: 0
2. NOT BEING ABLE TO STOP OR CONTROL WORRYING: NOT AT ALL
3. WORRYING TOO MUCH ABOUT DIFFERENT THINGS: NOT AT ALL
4. TROUBLE RELAXING: NOT AT ALL

## 2018-08-09 ASSESSMENT — PAIN SCALES - GENERAL: PAINLEVEL: MODERATE PAIN (4)

## 2018-08-09 NOTE — TELEPHONE ENCOUNTER
4:00 PM    Reason for Call: Phone Call    Description: Seema from [facility] called and states that she would like to try to get a fax of his after visit satishmona faxed to her at [phone]    Was an appointment offered for this call? No  If yes : Appointment type              Date    Preferred method for responding to this message: Telephone Call  What is your phone number ?[phone]    If we cannot reach you directly, may we leave a detailed response at the number you provided? Yes    Can this message wait until your PCP/provider returns, if available today? Not applicable, PCP is in     Kisha Lord

## 2018-08-09 NOTE — NURSING NOTE
"Chief Complaint   Patient presents with     Musculoskeletal Problem     Hearing Problem       Initial /68 (BP Location: Right arm, Patient Position: Chair, Cuff Size: Adult Large)  Pulse 73  Temp 97.3  F (36.3  C) (Tympanic)  Ht 5' 11\" (1.803 m)  Wt 164 lb (74.4 kg)  SpO2 95%  BMI 22.87 kg/m2 Estimated body mass index is 22.87 kg/(m^2) as calculated from the following:    Height as of this encounter: 5' 11\" (1.803 m).    Weight as of this encounter: 164 lb (74.4 kg).  Medication Reconciliation: complete    TERRENCE MARTÍNEZ LPN  " 04-Jan-2017

## 2018-08-09 NOTE — MR AVS SNAPSHOT
After Visit Summary   8/9/2018    Christiano Leon    MRN: [id number]           Patient Information     Date Of Birth          1945        Visit Information        Provider Department      8/9/2018 2:45 PM ROBERT Akbar MD [facility]        Today's Diagnoses     Chronic right shoulder pain    -  1    Impacted cerumen of left ear           Follow-ups after your visit        Additional Services     ORTHOPEDICS ADULT REFERRAL       Your provider has referred you to: [facility] [phone]  [email or web address]    Please be aware that coverage of these services is subject to the terms and limitations of your health insurance plan.  Call member services at your health plan with any benefit or coverage questions.      Please bring the following to your appointment:    >>   Any x-rays, CTs or MRIs which have been performed.  Contact the facility where they were done to arrange for  prior to your scheduled appointment.  Any new CT, MRI or other procedures ordered by your specialist must be performed at a [place] facility or coordinated by your clinic's referral office.    >>   List of current medications   >>   This referral request   >>   Any documents/labs given to you for this referral            OTOLARYNGOLOGY REFERRAL       Your provider has referred you to: [facility] [phone]   [email or web address]    Please be aware that coverage of these services is subject to the terms and limitations of your health insurance plan.  Call member services at your health plan with any benefit or coverage questions.      Please bring the following to your appointment:  >>   Any x-rays, CTs or MRIs which have been performed.  Contact the facility where they were done to arrange for  prior to your scheduled appointment.  Any new CT, MRI or other procedures  ordered by your specialist must be performed at a [facility] facility or coordinated by your clinic's referral office.    >>   List of current medications   >>   This referral request   >>   Any documents/labs given to you for this referral                  Your next 10 appointments already scheduled     Aug 14, 2018 11:45 AM CDT   (Arrive by 11:30 AM)   New Visit with ZANE Rodriguez CNP   [facility] ([facility] )    [address]  [place] [address]   [phone]            Aug 23, 2018 10:40 AM CDT   (Arrive by 10:25 AM)   New Visit with Alfred Rose DO    ORTHOPEDICS ([facility] )    [address]  [place] [address] [phone]            Oct 04, 2018  8:15 AM CDT   (Arrive by 8:00 AM)   Office Visit with ROBERT Akbra MD   [facility] ([facility] )    [address]  [place] [address]   [phone]           Bring a current list of meds and any records pertaining to this visit.  For Physicals, please bring immunization records and any forms needing to be filled out.  Please arrive 15 minutes early to complete paperwork and register.              Who to contact     If you have questions or need follow up information about today's clinic visit or your schedule please contact [facility] directly at [phone].  Normal or non-critical lab and imaging results will be communicated to you by MyChart, letter or phone within 4 business days after the clinic has received the results. If you do not hear from us within 7 days, please contact the clinic through MyChart or phone. If you have a critical or abnormal lab result, we will notify you by phone as soon as possible.  Submit refill requests through [employer] or call your pharmacy and they will forward the refill request to us. Please allow 3 business days for your refill to be completed.          Additional Information About Your Visit    "     Care EveryWhere ID     This is your Care EveryWhere ID. This could be used by other organizations to access your [place] [facility] medical records  [id number]        Your Vitals Were     Pulse Temperature Height Pulse Oximetry BMI (Body Mass Index)       73 97.3  F (36.3  C) (Tympanic) 5' 11\" (1.803 m) 95% 22.87 kg/m2        Blood Pressure from Last 3 Encounters:   08/09/18 102/68   05/29/18 108/64   04/03/18 104/70    Weight from Last 3 Encounters:   08/09/18 164 lb (74.4 kg)   05/29/18 168 lb (76.2 kg)   04/03/18 168 lb 12.8 oz (76.6 kg)              We Performed the Following     ORTHOPEDICS ADULT REFERRAL     OTOLARYNGOLOGY REFERRAL        Primary Care Provider Office Phone # Fax #    R Dustin Akbar [phone]       [facility] [address]        Equal Access to Services     [facility] : Hadii aad ku hadasho Soomaali, waaxda luqadaha, qaybta kaalmada adeegyada, waxay idiin hayaan ora roberson . So [facility] [phone].    ATENCIÓN: Si habla español, tiene a pierce disposición servicios gratuitos de asistencia lingüística. Llame al [phone].    We comply with applicable federal civil rights laws and [place] laws. We do not discriminate on the basis of race, color, national origin, age, disability, sex, sexual orientation, or gender identity.            Thank you!     Thank you for choosing [facility]  for your care. Our goal is always to provide you with excellent care. Hearing back from our patients is one way we can continue to improve our services. Please take a few minutes to complete the written survey that you may receive in the mail after your visit with us. Thank you!             Your Updated Medication List - Protect others around you: Learn how to safely use, store and throw away your medicines at www.disposemymeds.org.          This list is accurate as of 8/9/18 11:59 PM.  Always use your most recent med list.                   Brand Name Dispense " Instructions for use Diagnosis    ARTIFICIAL TEAR OP      Apply 1 drop to eye 4 times daily         MG capsule   Generic drug:  docusate sodium     100 capsule    TAKE 2 CAPSULES (200MG) BY MOUTH TWICE DAILY    Constipation, unspecified constipation type       levothyroxine 50 MCG tablet    SYNTHROID/LEVOTHROID    30 tablet    TAKE 1 TABLET DAILY BY MOUTH - GENERIC FOR SYNTHROID    Hypothyroidism, unspecified type       SEROQUEL PO      Take 900 mg by mouth At Bedtime        simvastatin 40 MG tablet    ZOCOR    90 tablet    Take 1 tablet (40 mg) by mouth At Bedtime    Hyperlipidemia LDL goal <100       TYLENOL PO      Take 1,000 mg by mouth every 6 hours as needed for mild pain or fever        * VITAMIN D-3 PO      Take 1,000 Units by mouth daily        * cholecalciferol 1000 UNIT tablet    vitamin D3    100 tablet    TAKE 1 TABLET BY MOUTH ONCE DAILY    Vitamin D deficiency       * Notice:  This list has 2 medication(s) that are the same as other medications prescribed for you. Read the directions carefully, and ask your doctor or other care provider to review them with you.

## 2018-08-10 ASSESSMENT — PATIENT HEALTH QUESTIONNAIRE - PHQ9: SUM OF ALL RESPONSES TO PHQ QUESTIONS 1-9: 0

## 2018-08-10 ASSESSMENT — ANXIETY QUESTIONNAIRES: GAD7 TOTAL SCORE: 0

## 2018-08-14 ENCOUNTER — OFFICE VISIT (OUTPATIENT)
Dept: OTOLARYNGOLOGY | Facility: OTHER | Age: 73
End: 2018-08-14
Attending: NURSE PRACTITIONER
Payer: MEDICARE

## 2018-08-14 VITALS
SYSTOLIC BLOOD PRESSURE: 106 MMHG | HEART RATE: 74 BPM | BODY MASS INDEX: 22.96 KG/M2 | OXYGEN SATURATION: 95 % | WEIGHT: 164 LBS | HEIGHT: 71 IN | DIASTOLIC BLOOD PRESSURE: 66 MMHG | TEMPERATURE: 98 F

## 2018-08-14 DIAGNOSIS — H91.90 HEARING LOSS, UNSPECIFIED HEARING LOSS TYPE, UNSPECIFIED LATERALITY: ICD-10-CM

## 2018-08-14 DIAGNOSIS — H61.23 BILATERAL IMPACTED CERUMEN: Primary | ICD-10-CM

## 2018-08-14 PROCEDURE — G0463 HOSPITAL OUTPT CLINIC VISIT: HCPCS | Mod: 25

## 2018-08-14 PROCEDURE — 69210 REMOVE IMPACTED EAR WAX UNI: CPT | Performed by: NURSE PRACTITIONER

## 2018-08-14 PROCEDURE — 99213 OFFICE O/P EST LOW 20 MIN: CPT | Mod: 25 | Performed by: NURSE PRACTITIONER

## 2018-08-14 PROCEDURE — 92504 EAR MICROSCOPY EXAMINATION: CPT | Performed by: NURSE PRACTITIONER

## 2018-08-14 RX ORDER — OFLOXACIN 3 MG/ML
10 SOLUTION AURICULAR (OTIC) DAILY
Qty: 1 BOTTLE | Refills: 0 | Status: SHIPPED | OUTPATIENT
Start: 2018-08-14 | End: 2018-08-21

## 2018-08-14 ASSESSMENT — PAIN SCALES - GENERAL: PAINLEVEL: NO PAIN (0)

## 2018-08-14 NOTE — PROGRESS NOTES
Otolaryngology Note         Chief Complaint:     Patient presents with:  Cerumen Impaction: ear cleaning- JEFF Akbar         History of Present Illness:     Christiano Leon is a 73 year old male seen today for ear cleaning, referred by Raffy. Christiano comes from [place].    Hearing loss has been gradual over the years with no acute changes. No fluctuating hearing loss.  No otalgia, otorrhea or aura fullness.  Vertigo: denies  Tinnitus: denies  There is no facial weakness, facial numbness or dysphagia.  No COM, otologic surgeries or trauma.    Audiogram: nothing recent         Medications:     Current Outpatient Rx   Medication Sig Dispense Refill     Acetaminophen (TYLENOL PO) Take 1,000 mg by mouth every 6 hours as needed for mild pain or fever       ARTIFICIAL TEAR OP Apply 1 drop to eye 4 times daily       Cholecalciferol (VITAMIN D-3 PO) Take 1,000 Units by mouth daily        MG capsule TAKE 2 CAPSULES (200MG) BY MOUTH TWICE DAILY 100 capsule 11     levothyroxine (SYNTHROID/LEVOTHROID) 50 MCG tablet TAKE 1 TABLET DAILY BY MOUTH - GENERIC FOR SYNTHROID 30 tablet 8     QUEtiapine Fumarate (SEROQUEL PO) Take 900 mg by mouth At Bedtime       simvastatin (ZOCOR) 40 MG tablet Take 1 tablet (40 mg) by mouth At Bedtime 90 tablet 3     VITAMIN D3 1000 units tablet TAKE 1 TABLET BY MOUTH ONCE DAILY 100 tablet 3            Allergies:     Allergies: Nickel; Penicillins; and Seasonal allergies          Past Medical History:     Past Medical History:   Diagnosis Date     BPH (benign prostatic hyperplasia)      Poor dentition             Past Surgical History:     Past Surgical History:   Procedure Laterality Date     Bilateral cataract       HERNIA REPAIR Right 2007?     Right knee meniscectomy Right        ENT family history reviewed         Social History:     Social History   Substance Use Topics     Smoking status: Former Smoker     Packs/day: 0.50     Types: Cigarettes     Quit date: 5/29/1993     Smokeless tobacco:  "Never Used     Alcohol use No            Review of Systems:     ROS: See HPI         Physical Exam:     /66 (BP Location: Right arm, Patient Position: Chair, Cuff Size: Adult Regular)  Pulse 74  Temp 98  F (36.7  C) (Tympanic)  Ht 5' 11\" (1.803 m)  Wt 164 lb (74.4 kg)  SpO2 95%  BMI 22.87 kg/m2  General - The patient is well nourished and well developed, and appears to have good nutritional status.  Alert and oriented to person and place, answers questions and cooperates with examination appropriately.   Head and Face - Normocephalic and atraumatic, with no gross asymmetry noted.  The facial nerve is intact, with strong symmetric movements.  Voice and Breathing - The patient was breathing comfortably without the use of accessory muscles. There was no wheezing, stridor. The patients voice was clear and strong, and had appropriate pitch and quality.  Ears - External ear normal. Ears examined under microscope. Right EAC with scant cerumen, debrided with cerumen loop, some overhang EAC, limiting view of anterior TM, otherwise TM is intact. Left EAC with copious thick cerumen medial canal, debrided some with #5 suction, however, difficult removal due to consistency, unable to debride all and cannot yet visualize TM.   Eyes - Extraocular movements intact, and the pupils were reactive to light. Sclera were not icteric or injected, conjunctiva were pink and moist.  Mouth - Examination of the oral cavity showed pink, healthy oral mucosa. Dentition in poor condition, many missing. No lesions or ulcerations noted. The tongue was mobile and midline.   Throat - The walls of the oropharynx were smooth, pink, moist, symmetric, and had no lesions or ulcerations.  The tonsillar pillars and soft palate were symmetric. The uvula was midline on elevation.    Neck - Normal midline excursion of the laryngotracheal complex during swallowing.  Full range of motion on passive movement.  Palpation of the occipital, submental, " submandibular, internal jugular chain, and supraclavicular nodes did not demonstrate any abnormal lymph nodes or masses.  Palpation of the thyroid was soft and smooth, with no nodules or goiter appreciated.  The trachea was mobile and midline.  Nose - External contour is symmetric, no gross deflection or scars.  Nasal mucosa is pink and moist with no abnormal mucus.  The septum and turbinates were evaluated: unremarkable.  No polyps, masses, or purulence noted on examination.         Assessment and Plan:       ICD-10-CM    1. Bilateral impacted cerumen H61.23 ofloxacin (FLOXIN) 0.3 % otic solution   2. Hearing loss, unspecified hearing loss type, unspecified laterality H91.90      Right ear cleaned.   Left ear with remaining thick cerumen.  Start Floxin otic to left ear as directed x 1 week.  No Q-tip use.    Return in 1-2 weeks for further debridement.  He defers audiogram    Paperwork filled out for [place].  Return sooner as needed.    Carmen Kay NP  ENT  [facility] [place]  [phone]

## 2018-08-14 NOTE — LETTER
8/14/2018         RE: Christiano Leon  [address]  [facility] [place] [address]        Dear Colleague,    Thank you for referring your patient, Christiano Leon, to the [facility]. Please see a copy of my visit note below.    Otolaryngology Note         Chief Complaint:     Patient presents with:  Cerumen Impaction: ear cleaning- JEFF Akbar         History of Present Illness:     Christiano Leon is a 73 year old male seen today for ear cleaning, referred by Raffy. Christiano comes from [place].    Hearing loss has been gradual over the years with no acute changes. No fluctuating hearing loss.  No otalgia, otorrhea or aura fullness.  Vertigo: denies  Tinnitus: denies  There is no facial weakness, facial numbness or dysphagia.  No COM, otologic surgeries or trauma.    Audiogram: nothing recent         Medications:     Current Outpatient Rx   Medication Sig Dispense Refill     Acetaminophen (TYLENOL PO) Take 1,000 mg by mouth every 6 hours as needed for mild pain or fever       ARTIFICIAL TEAR OP Apply 1 drop to eye 4 times daily       Cholecalciferol (VITAMIN D-3 PO) Take 1,000 Units by mouth daily        MG capsule TAKE 2 CAPSULES (200MG) BY MOUTH TWICE DAILY 100 capsule 11     levothyroxine (SYNTHROID/LEVOTHROID) 50 MCG tablet TAKE 1 TABLET DAILY BY MOUTH - GENERIC FOR SYNTHROID 30 tablet 8     QUEtiapine Fumarate (SEROQUEL PO) Take 900 mg by mouth At Bedtime       simvastatin (ZOCOR) 40 MG tablet Take 1 tablet (40 mg) by mouth At Bedtime 90 tablet 3     VITAMIN D3 1000 units tablet TAKE 1 TABLET BY MOUTH ONCE DAILY 100 tablet 3            Allergies:     Allergies: Nickel; Penicillins; and Seasonal allergies          Past Medical History:     Past Medical History:   Diagnosis Date     BPH (benign prostatic hyperplasia)      Poor dentition             Past Surgical History:     Past Surgical History:   Procedure Laterality Date     Bilateral cataract       HERNIA REPAIR Right 2007?     Right knee  "meniscectomy Right        ENT family history reviewed         Social History:     Social History   Substance Use Topics     Smoking status: Former Smoker     Packs/day: 0.50     Types: Cigarettes     Quit date: 5/29/1993     Smokeless tobacco: Never Used     Alcohol use No            Review of Systems:     ROS: See HPI         Physical Exam:     /66 (BP Location: Right arm, Patient Position: Chair, Cuff Size: Adult Regular)  Pulse 74  Temp 98  F (36.7  C) (Tympanic)  Ht 5' 11\" (1.803 m)  Wt 164 lb (74.4 kg)  SpO2 95%  BMI 22.87 kg/m2  General - The patient is well nourished and well developed, and appears to have good nutritional status.  Alert and oriented to person and place, answers questions and cooperates with examination appropriately.   Head and Face - Normocephalic and atraumatic, with no gross asymmetry noted.  The facial nerve is intact, with strong symmetric movements.  Voice and Breathing - The patient was breathing comfortably without the use of accessory muscles. There was no wheezing, stridor. The patients voice was clear and strong, and had appropriate pitch and quality.  Ears - External ear normal. Ears examined under microscope. Right EAC with scant cerumen, debrided with cerumen loop, some overhang EAC, limiting view of anterior TM, otherwise TM is intact. Left EAC with copious thick cerumen medial canal, debrided some with #5 suction, however, difficult removal due to consistency, unable to debride all and cannot yet visualize TM.   Eyes - Extraocular movements intact, and the pupils were reactive to light. Sclera were not icteric or injected, conjunctiva were pink and moist.  Mouth - Examination of the oral cavity showed pink, healthy oral mucosa. Dentition in poor condition, many missing. No lesions or ulcerations noted. The tongue was mobile and midline.   Throat - The walls of the oropharynx were smooth, pink, moist, symmetric, and had no lesions or ulcerations.  The tonsillar " pillars and soft palate were symmetric. The uvula was midline on elevation.    Neck - Normal midline excursion of the laryngotracheal complex during swallowing.  Full range of motion on passive movement.  Palpation of the occipital, submental, submandibular, internal jugular chain, and supraclavicular nodes did not demonstrate any abnormal lymph nodes or masses.  Palpation of the thyroid was soft and smooth, with no nodules or goiter appreciated.  The trachea was mobile and midline.  Nose - External contour is symmetric, no gross deflection or scars.  Nasal mucosa is pink and moist with no abnormal mucus.  The septum and turbinates were evaluated: unremarkable.  No polyps, masses, or purulence noted on examination.         Assessment and Plan:       ICD-10-CM    1. Bilateral impacted cerumen H61.23 ofloxacin (FLOXIN) 0.3 % otic solution   2. Hearing loss, unspecified hearing loss type, unspecified laterality H91.90      Right ear cleaned.   Left ear with remaining thick cerumen.  Start Floxin otic to left ear as directed x 1 week.  No Q-tip use.    Return in 1-2 weeks for further debridement.  He defers audiogram    Paperwork filled out for [place].  Return sooner as needed.    Carmen Kay NP  ENT  [facility], [place]  [phone]      Again, thank you for allowing me to participate in the care of your patient.        Sincerely,        Carmen Kay, ZANE CNP

## 2018-08-14 NOTE — MR AVS SNAPSHOT
After Visit Summary   8/14/2018    Christiano Leon    MRN: [id number]           Patient Information     Date Of Birth          1945        Visit Information        Provider Department      8/14/2018 11:45 AM Carmen Kay APRN CNP [facility]        Today's Diagnoses     Impacted cerumen of left ear          Care Instructions    Use drops as directed, follow up in one week for further ear debridement.    Thank you for allowing Carmen Kay CNP and our ENT team to participate in your care.  If your medications are too expensive, please give the nurse a call.  We can possibly change this medication.  If you have a scheduling or an appointment question please contact [facility] Unit Coordinator at their direct line [phone].   ALL nursing questions or concerns can be directed to your ENT nurse at: [phone] - Gigi            Follow-ups after your visit        Your next 10 appointments already scheduled     Aug 23, 2018 10:40 AM CDT   (Arrive by 10:25 AM)   New Visit with Alfred Rose DO    ORTHOPEDICS ([facility] )    [address] [place] [facility] [id number] [phone]            Oct 04, 2018  8:15 AM CDT   (Arrive by 8:00 AM)   Office Visit with ROBERT Akbar MD   [facility] ([facility] )    [address] [facility] [id number] [phone]           Bring a current list of meds and any records pertaining to this visit.  For Physicals, please bring immunization records and any forms needing to be filled out.  Please arrive 15 minutes early to complete paperwork and register.              Who to contact     If you have questions or need follow up information about today's clinic visit or your schedule please contact [facility] directly at [phone].  Normal or non-critical lab and imaging results will be communicated to you by MyChart, letter or phone within 4 business days after the  "clinic has received the results. If you do not hear from us within 7 days, please contact the clinic through [employer] or phone. If you have a critical or abnormal lab result, we will notify you by phone as soon as possible.  Submit refill requests through [employer] or call your pharmacy and they will forward the refill request to us. Please allow 3 business days for your refill to be completed.          Additional Information About Your Visit        Care EveryWhere ID     This is your Care EveryWhere ID. This could be used by other organizations to access your [place] medical records  [id number]        Your Vitals Were     Pulse Temperature Height Pulse Oximetry BMI (Body Mass Index)       74 98  F (36.7  C) (Tympanic) 5' 11\" (1.803 m) 95% 22.87 kg/m2        Blood Pressure from Last 3 Encounters:   08/14/18 106/66   08/09/18 102/68   05/29/18 108/64    Weight from Last 3 Encounters:   08/14/18 164 lb (74.4 kg)   08/09/18 164 lb (74.4 kg)   05/29/18 168 lb (76.2 kg)              Today, you had the following     No orders found for display       Primary Care Provider Office Phone # Fax #    R Dustin Akbar [phone] [address] [address]        Equal Access to Services     [facility] DULCE AH: Hadii kellee ku hadasho Soomaali, waaxda luqadaha, qaybta kaalmada adeegyada, stoney roberson . So [facility] [phone].    ATENCIÓN: Si habla español, tiene a pierce disposición servicios gratuitos de asistencia lingüística. Llame al [phone].    We comply with applicable federal civil rights laws and [place] laws. We do not discriminate on the basis of race, color, national origin, age, disability, sex, sexual orientation, or gender identity.            Thank you!     Thank you for choosing [facility]  for your care. Our goal is always to provide you with excellent care. Hearing back from our patients is one way we can continue to improve our services. " Please take a few minutes to complete the written survey that you may receive in the mail after your visit with us. Thank you!             Your Updated Medication List - Protect others around you: Learn how to safely use, store and throw away your medicines at www.disposemymeds.org.          This list is accurate as of 8/14/18 12:00 PM.  Always use your most recent med list.                   Brand Name Dispense Instructions for use Diagnosis    ARTIFICIAL TEAR OP      Apply 1 drop to eye 4 times daily         MG capsule   Generic drug:  docusate sodium     100 capsule    TAKE 2 CAPSULES (200MG) BY MOUTH TWICE DAILY    Constipation, unspecified constipation type       levothyroxine 50 MCG tablet    SYNTHROID/LEVOTHROID    30 tablet    TAKE 1 TABLET DAILY BY MOUTH - GENERIC FOR SYNTHROID    Hypothyroidism, unspecified type       SEROQUEL PO      Take 900 mg by mouth At Bedtime        simvastatin 40 MG tablet    ZOCOR    90 tablet    Take 1 tablet (40 mg) by mouth At Bedtime    Hyperlipidemia LDL goal <100       TYLENOL PO      Take 1,000 mg by mouth every 6 hours as needed for mild pain or fever        * VITAMIN D-3 PO      Take 1,000 Units by mouth daily        * cholecalciferol 1000 UNIT tablet    vitamin D3    100 tablet    TAKE 1 TABLET BY MOUTH ONCE DAILY    Vitamin D deficiency       * Notice:  This list has 2 medication(s) that are the same as other medications prescribed for you. Read the directions carefully, and ask your doctor or other care provider to review them with you.

## 2018-08-14 NOTE — NURSING NOTE
"Chief Complaint   Patient presents with     Cerumen Impaction     ear emmett Akbar       Initial /66 (BP Location: Right arm, Patient Position: Chair, Cuff Size: Adult Regular)  Pulse 74  Temp 98  F (36.7  C) (Tympanic)  Ht 5' 11\" (1.803 m)  Wt 164 lb (74.4 kg)  SpO2 95%  BMI 22.87 kg/m2 Estimated body mass index is 22.87 kg/(m^2) as calculated from the following:    Height as of this encounter: 5' 11\" (1.803 m).    Weight as of this encounter: 164 lb (74.4 kg).  Medication Reconciliation: complete    Perri Zamora LPN    "

## 2018-08-14 NOTE — PATIENT INSTRUCTIONS
Use drops as directed, follow up in one week for further ear debridement.    Thank you for allowing Carmen Kay CNP and our ENT team to participate in your care.  If your medications are too expensive, please give the nurse a call.  We can possibly change this medication.  If you have a scheduling or an appointment question please contact [facility] Unit Coordinator at their direct line [phone].   ALL nursing questions or concerns can be directed to your ENT nurse at: [phone] - Gigi

## 2018-08-22 ENCOUNTER — OFFICE VISIT (OUTPATIENT)
Dept: OTOLARYNGOLOGY | Facility: OTHER | Age: 73
End: 2018-08-22
Attending: NURSE PRACTITIONER
Payer: MEDICARE

## 2018-08-22 VITALS
HEIGHT: 71 IN | TEMPERATURE: 97.3 F | HEART RATE: 73 BPM | WEIGHT: 164 LBS | DIASTOLIC BLOOD PRESSURE: 68 MMHG | BODY MASS INDEX: 22.96 KG/M2 | SYSTOLIC BLOOD PRESSURE: 123 MMHG | OXYGEN SATURATION: 97 %

## 2018-08-22 DIAGNOSIS — H91.90 HEARING LOSS, UNSPECIFIED HEARING LOSS TYPE, UNSPECIFIED LATERALITY: ICD-10-CM

## 2018-08-22 DIAGNOSIS — H61.22 IMPACTED CERUMEN OF LEFT EAR: Primary | ICD-10-CM

## 2018-08-22 PROCEDURE — G0463 HOSPITAL OUTPT CLINIC VISIT: HCPCS

## 2018-08-22 PROCEDURE — 92504 EAR MICROSCOPY EXAMINATION: CPT | Performed by: NURSE PRACTITIONER

## 2018-08-22 PROCEDURE — 69210 REMOVE IMPACTED EAR WAX UNI: CPT | Performed by: NURSE PRACTITIONER

## 2018-08-22 PROCEDURE — 99213 OFFICE O/P EST LOW 20 MIN: CPT | Mod: 25 | Performed by: NURSE PRACTITIONER

## 2018-08-22 ASSESSMENT — PAIN SCALES - GENERAL: PAINLEVEL: NO PAIN (0)

## 2018-08-22 NOTE — NURSING NOTE
"Chief Complaint   Patient presents with     Ear Problem     pt is being seen for ear cleaning       Initial /68 (BP Location: Right arm, Cuff Size: Adult Regular)  Pulse 73  Temp 97.3  F (36.3  C) (Tympanic)  Ht 5' 11\" (1.803 m)  Wt 164 lb (74.4 kg)  SpO2 97%  BMI 22.87 kg/m2 Estimated body mass index is 22.87 kg/(m^2) as calculated from the following:    Height as of this encounter: 5' 11\" (1.803 m).    Weight as of this encounter: 164 lb (74.4 kg).  Medication Reconciliation: complete    Babita Dela Cruz LPN  "

## 2018-08-22 NOTE — PATIENT INSTRUCTIONS
Follow up with audiogram    Return to ENT as needed.      Thank you for allowing Carmen Kay CNP and our ENT team to participate in your care.  If your medications are too expensive, please give the nurse a call.  We can possibly change this medication.  If you have a scheduling or an appointment question please contact [facility] Unit Coordinator at their direct line [phone].   ALL nursing questions or concerns can be directed to your ENT nurse at: [phone]- Etdk

## 2018-08-22 NOTE — PROGRESS NOTES
"Otolaryngology Progress Note           Chief Complaint:     Patient presents with:  Ear Problem: pt is being seen for ear cleaning         History of Present Illness:     Christiano Leon is a 73 year old male here to f/u on ear cleaning. I saw him 8/14/18 and was able to debride right ear, but thick cerumen remained in left ear, so had him do 1 week of floxin otic.    Today Christiano reports he completed the floxin otic as directed. Denies otalgia/otorrhea. No changes in hearing. Does not bilateral intermittent tinnitus. No vertigo.          Review of Systems:     See HPI         Physical Exam:     /68 (BP Location: Right arm, Cuff Size: Adult Regular)  Pulse 73  Temp 97.3  F (36.3  C) (Tympanic)  Ht 5' 11\" (1.803 m)  Wt 164 lb (74.4 kg)  SpO2 97%  BMI 22.87 kg/m2  General - The patient is well nourished and well developed, and appears to have good nutritional status.  Alert and oriented to person and place, interactive.  Head and Face - Normocephalic and atraumatic, with no gross asymmetry noted of the contour of the facial features.  The facial nerve is intact, with strong symmetric movements.  Neck-no palpable lymphadenopathy or thyroid mass.  Trachea is midline.  Eyes - Extraocular movements intact.   Ears- External ears normal. Ears examined under microscope. Right EAC patent, TM intact. Left EAC with cerumen and on TM, did gently flush with normal saline with multiple debridements using #3 and #5 suction, was able to get cerumen out, but thin layer remains on TM and unable to debride off as it caused discomfort with patient.   Nose - Nasal mucosa is pink and moist with no abnormal mucus.  The septum was grossly midline and non-obstructive, turbinates of normal size and position.  No polyps, masses, or purulence noted on examination.  Mouth - Examination of the oral cavity shows pink, healthy, moist mucosa. Dentition in poor condition, many missing.   No lesions or ulceration noted. The tongue is mobile and " midline.    Throat - The walls of the oropharynx were smooth, pink, moist, symmetric, and had no lesions or ulcerations.  The tonsillar pillars and soft palate were symmetric.  The uvula was midline on elevation.           Assessment and Plan:       ICD-10-CM    1. Impacted cerumen of left ear H61.22    2. Hearing loss, unspecified hearing loss type, unspecified laterality H91.90      Most of cerumen debrided from left EAC, thin scattered cerumen on TM, unable to tolerate further debridement.  He will return for audiogram in the future, then continue with annual audiograms, sooner with any acute hearing changes.     Paperwork for [place] filled out.    Encouraged to f/u in ENT as needed.    Carmen Kay NP  ENT  [facility], [place]  [phone]

## 2018-08-22 NOTE — LETTER
"    8/22/2018         RE: Christiano Leon  [address]  [place] [address]        Dear Colleague,    Thank you for referring your patient, Christiano Leon, to the [facility] [place]. Please see a copy of my visit note below.    Otolaryngology Progress Note           Chief Complaint:     Patient presents with:  Ear Problem: pt is being seen for ear cleaning         History of Present Illness:     Christiano Leon is a 73 year old male here to f/u on ear cleaning. I saw him 8/14/18 and was able to debride right ear, but thick cerumen remained in left ear, so had him do 1 week of floxin otic.    Today Christiano reports he completed the floxin otic as directed. Denies otalgia/otorrhea. No changes in hearing. Does not bilateral intermittent tinnitus. No vertigo.          Review of Systems:     See HPI         Physical Exam:     /68 (BP Location: Right arm, Cuff Size: Adult Regular)  Pulse 73  Temp 97.3  F (36.3  C) (Tympanic)  Ht 5' 11\" (1.803 m)  Wt 164 lb (74.4 kg)  SpO2 97%  BMI 22.87 kg/m2  General - The patient is well nourished and well developed, and appears to have good nutritional status.  Alert and oriented to person and place, interactive.  Head and Face - Normocephalic and atraumatic, with no gross asymmetry noted of the contour of the facial features.  The facial nerve is intact, with strong symmetric movements.  Neck-no palpable lymphadenopathy or thyroid mass.  Trachea is midline.  Eyes - Extraocular movements intact.   Ears- External ears normal. Ears examined under microscope. Right EAC patent, TM intact. Left EAC with cerumen and on TM, did gently flush with normal saline with multiple debridements using #3 and #5 suction, was able to get cerumen out, but thin layer remains on TM and unable to debride off as it caused discomfort with patient.   Nose - Nasal mucosa is pink and moist with no abnormal mucus.  The septum was grossly midline and non-obstructive, turbinates of normal size and " position.  No polyps, masses, or purulence noted on examination.  Mouth - Examination of the oral cavity shows pink, healthy, moist mucosa. Dentition in poor condition, many missing.   No lesions or ulceration noted. The tongue is mobile and midline.    Throat - The walls of the oropharynx were smooth, pink, moist, symmetric, and had no lesions or ulcerations.  The tonsillar pillars and soft palate were symmetric.  The uvula was midline on elevation.           Assessment and Plan:       ICD-10-CM    1. Impacted cerumen of left ear H61.22    2. Hearing loss, unspecified hearing loss type, unspecified laterality H91.90      Most of cerumen debrided from left EAC, thin scattered cerumen on TM, unable to tolerate further debridement.  He will return for audiogram in the future, then continue with annual audiograms, sooner with any acute hearing changes.     Paperwork for [place] filled out.    Encouraged to f/u in ENT as needed.    Carmen Kay NP  ENT  [facility], [place]  [phone]      Again, thank you for allowing me to participate in the care of your patient.        Sincerely,        Carmen Kay, ZANE CNP

## 2018-09-06 DIAGNOSIS — H91.93 DECREASED HEARING OF BOTH EARS: Primary | ICD-10-CM

## 2018-09-11 ENCOUNTER — OFFICE VISIT (OUTPATIENT)
Dept: ORTHOPEDICS | Facility: OTHER | Age: 73
End: 2018-09-11
Attending: ORTHOPAEDIC SURGERY
Payer: MEDICARE

## 2018-09-11 ENCOUNTER — OFFICE VISIT (OUTPATIENT)
Dept: AUDIOLOGY | Facility: OTHER | Age: 73
End: 2018-09-11
Attending: AUDIOLOGIST
Payer: MEDICARE

## 2018-09-11 VITALS
TEMPERATURE: 96.1 F | BODY MASS INDEX: 22.96 KG/M2 | DIASTOLIC BLOOD PRESSURE: 66 MMHG | HEART RATE: 66 BPM | HEIGHT: 71 IN | OXYGEN SATURATION: 96 % | WEIGHT: 164 LBS | SYSTOLIC BLOOD PRESSURE: 100 MMHG

## 2018-09-11 DIAGNOSIS — H91.93 DECREASED HEARING OF BOTH EARS: Primary | ICD-10-CM

## 2018-09-11 DIAGNOSIS — G89.29 CHRONIC RIGHT SHOULDER PAIN: ICD-10-CM

## 2018-09-11 DIAGNOSIS — M25.511 CHRONIC RIGHT SHOULDER PAIN: ICD-10-CM

## 2018-09-11 DIAGNOSIS — H61.22 IMPACTED CERUMEN OF LEFT EAR: Primary | ICD-10-CM

## 2018-09-11 DIAGNOSIS — Z79.899 HIGH RISK MEDICATION USE: Primary | ICD-10-CM

## 2018-09-11 LAB
ALBUMIN SERPL-MCNC: 3.4 G/DL (ref 3.4–5)
ALP SERPL-CCNC: 82 U/L (ref 40–150)
ALT SERPL W P-5'-P-CCNC: 14 U/L (ref 0–70)
ANION GAP SERPL CALCULATED.3IONS-SCNC: 7 MMOL/L (ref 3–14)
AST SERPL W P-5'-P-CCNC: 14 U/L (ref 0–45)
BILIRUB SERPL-MCNC: 0.3 MG/DL (ref 0.2–1.3)
BUN SERPL-MCNC: 18 MG/DL (ref 7–30)
CALCIUM SERPL-MCNC: 8.5 MG/DL (ref 8.5–10.1)
CHLORIDE SERPL-SCNC: 106 MMOL/L (ref 94–109)
CHOLEST SERPL-MCNC: 118 MG/DL
CO2 SERPL-SCNC: 30 MMOL/L (ref 20–32)
CREAT SERPL-MCNC: 1.17 MG/DL (ref 0.66–1.25)
EST. AVERAGE GLUCOSE BLD GHB EST-MCNC: 120 MG/DL
GFR SERPL CREATININE-BSD FRML MDRD: 61 ML/MIN/1.7M2
GLUCOSE SERPL-MCNC: 98 MG/DL (ref 70–99)
HBA1C MFR BLD: 5.8 % (ref 0–5.6)
HDLC SERPL-MCNC: 38 MG/DL
LDLC SERPL CALC-MCNC: 58 MG/DL
NONHDLC SERPL-MCNC: 80 MG/DL
POTASSIUM SERPL-SCNC: 4.1 MMOL/L (ref 3.4–5.3)
PROT SERPL-MCNC: 6.8 G/DL (ref 6.8–8.8)
SODIUM SERPL-SCNC: 143 MMOL/L (ref 133–144)
TRIGL SERPL-MCNC: 111 MG/DL

## 2018-09-11 PROCEDURE — 40000788 ZZHCL STATISTIC ESTIMATED AVERAGE GLUCOSE: Mod: ZL | Performed by: PHYSICIAN ASSISTANT

## 2018-09-11 PROCEDURE — 80061 LIPID PANEL: CPT | Mod: ZL | Performed by: PHYSICIAN ASSISTANT

## 2018-09-11 PROCEDURE — G0463 HOSPITAL OUTPT CLINIC VISIT: HCPCS

## 2018-09-11 PROCEDURE — 99202 OFFICE O/P NEW SF 15 MIN: CPT | Performed by: ORTHOPAEDIC SURGERY

## 2018-09-11 PROCEDURE — 80053 COMPREHEN METABOLIC PANEL: CPT | Mod: ZL | Performed by: PHYSICIAN ASSISTANT

## 2018-09-11 PROCEDURE — G0463 HOSPITAL OUTPT CLINIC VISIT: HCPCS | Mod: 25

## 2018-09-11 PROCEDURE — 83036 HEMOGLOBIN GLYCOSYLATED A1C: CPT | Mod: ZL | Performed by: PHYSICIAN ASSISTANT

## 2018-09-11 PROCEDURE — 92567 TYMPANOMETRY: CPT | Performed by: AUDIOLOGIST

## 2018-09-11 PROCEDURE — 36415 COLL VENOUS BLD VENIPUNCTURE: CPT | Mod: ZL | Performed by: PHYSICIAN ASSISTANT

## 2018-09-11 ASSESSMENT — PAIN SCALES - GENERAL: PAINLEVEL: SEVERE PAIN (7)
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
## 2018-09-11 NOTE — NURSING NOTE
"Chief Complaint   Patient presents with     Pain     NPT Right Shoulder Pain       Initial /66  Pulse 66  Temp 96.1  F (35.6  C) (Tympanic)  Ht 5' 11\" (1.803 m)  Wt 164 lb (74.4 kg)  SpO2 96%  BMI 22.87 kg/m2 Estimated body mass index is 22.87 kg/(m^2) as calculated from the following:    Height as of this encounter: 5' 11\" (1.803 m).    Weight as of this encounter: 164 lb (74.4 kg).  Medication Reconciliation: complete    Anisha Jaen LPN    "

## 2018-09-11 NOTE — PROGRESS NOTES
Audiology Evaluation NOT Completed. Please refer SCANNED AUDIOGRAM and/or TYMPANOGRAM for BACKGROUND, RESULTS, RECOMMENDATIONS.    UNDER RECOMMENDATIONS ON AUDIOGRAM PATIENT REFERRED TO ENT WITH SYMPTOMS      Angela GORMAN, [facility]  Audiologist #[id number]

## 2018-09-11 NOTE — MR AVS SNAPSHOT
"              After Visit Summary   9/11/2018    Christiano Leon    MRN: [id number]           Patient Information     Date Of Birth          1945        Visit Information        Provider Department      9/11/2018 10:40 AM Alfred Rose DO  ORTHOPEDICS        Today's Diagnoses     Chronic right shoulder pain           Follow-ups after your visit        Additional Services     PHYSICAL THERAPY REFERRAL       *This therapy referral will be filtered to a centralized scheduling office at [facility] and the patient will receive a call to schedule an appointment at a [place] location most convenient for them. *     [facility] provides Physical Therapy evaluation and treatment and many specialty services across the [place] system.  If requesting a specialty program, please choose from the list below.    If you have not heard from the scheduling office within 2 business days, please call [phone] for all locations, with the exception of [place], please call [phone] and [facility], please call [phone]  Treatment: Evaluation & Treatment  Special Instructions/Modalities: as indicated  Special Programs: rom and strengthening, right shoulder for smal rotator cuff tear and glenohumeral arthritis    Please be aware that coverage of these services is subject to the terms and limitations of your health insurance plan.  Call member services at your health plan with any benefit or coverage questions.      **Note to Provider:  If you are referring outside of [place] for the therapy appointment, please list the name of the location in the \"special instructions\" above, print the referral and give to the patient to schedule the appointment.                  Your next 10 appointments already scheduled     Sep 14, 2018  8:30 AM CDT   (Arrive by 8:15 AM)   New Visit with ZANE Rodriguez [facility] ([facility]    [address] [place] [address]   [phone]            Sep 25, 2018  3:00 PM CDT   (Arrive by 2:45 PM)   Hearing Eval with Jonelle Lawton   [facility] [place] ([facility]    [address] [place] [address]   [phone]            Oct 04, 2018  8:15 AM CDT   (Arrive by 8:00 AM)   Office Visit with ROBERT Akbar MD   [facility] [place] ([facility] )    [address] [place] [address]   [phone]           Bring a current list of meds and any records pertaining to this visit.  For Physicals, please bring immunization records and any forms needing to be filled out.  Please arrive 15 minutes early to complete paperwork and register.              Who to contact     If you have questions or need follow up information about today's clinic visit or your schedule please contact  ORTHOPEDICS directly at [phone].  Normal or non-critical lab and imaging results will be communicated to you by Promoter.iohart, letter or phone within 4 business days after the clinic has received the results. If you do not hear from us within 7 days, please contact the clinic through BoostUpt or phone. If you have a critical or abnormal lab result, we will notify you by phone as soon as possible.  Submit refill requests through TimePoints or call your pharmacy and they will forward the refill request to us. Please allow 3 business days for your refill to be completed.          Additional Information About Your Visit        TimePoints Information     TimePoints lets you send messages to your doctor, view your test results, renew your prescriptions, schedule appointments and more. To sign up, go to [email or web address] . Click on \"Log in\" on the left side of the screen, which will take you to the Welcome page. Then click on \"Sign up Now\" on the right side of the page.     You will be asked to enter the access code listed below, as well as some personal information. Please follow the " "directions to create your username and password.     Your access code is: [email or web address]  Expires: 12/10/2018 10:30 AM     Your access code will  in 90 days. If you need help or a new code, please call your [place] clinic or [phone].        Care EveryWhere ID     This is your Care EveryWhere ID. This could be used by other organizations to access your [place] medical records  [id number]        Your Vitals Were     Pulse Temperature Height Pulse Oximetry BMI (Body Mass Index)       66 96.1  F (35.6  C) (Tympanic) 5' 11\" (1.803 m) 96% 22.87 kg/m2        Blood Pressure from Last 3 Encounters:   18 100/66   18 123/68   18 106/66    Weight from Last 3 Encounters:   18 164 lb (74.4 kg)   18 164 lb (74.4 kg)   18 164 lb (74.4 kg)              We Performed the Following     PHYSICAL THERAPY REFERRAL          Today's Medication Changes          These changes are accurate as of 18 11:38 AM.  If you have any questions, ask your nurse or doctor.               These medicines have changed or have updated prescriptions.        Dose/Directions    COLACE PO   This may have changed:  Another medication with the same name was removed. Continue taking this medication, and follow the directions you see here.   Changed by:  Alfred Rose DO        Dose:  200 mg   Take 200 mg by mouth 2 times daily   Refills:  0         Stop taking these medicines if you haven't already. Please contact your care team if you have questions.     ARTIFICIAL TEAR OP   Stopped by:  Alfred Rose DO                    Primary Care Provider Office Phone # Fax #    R Dustin Akbar [phone]       [address] [place], [place] [id number]        Equal Access to Services     [facility] DULCE AH: Keyona Pedroza, wacherelleda lujaswant, qaybta kaalmastoney sanford. So [facility] [phone].    ATENCIÓN: Si habla español, tiene a pierce disposición servicios " clair de asistencia lingüística. Anabel fernandez [phone].    We comply with applicable federal civil rights laws and [place] laws. We do not discriminate on the basis of race, color, national origin, age, disability, sex, sexual orientation, or gender identity.            Thank you!     Thank you for choosing  ORTHOPEDICS  for your care. Our goal is always to provide you with excellent care. Hearing back from our patients is one way we can continue to improve our services. Please take a few minutes to complete the written survey that you may receive in the mail after your visit with us. Thank you!             Your Updated Medication List - Protect others around you: Learn how to safely use, store and throw away your medicines at www.disposemymeds.org.          This list is accurate as of 9/11/18 11:38 AM.  Always use your most recent med list.                   Brand Name Dispense Instructions for use Diagnosis    cholecalciferol 1000 UNIT tablet    vitamin D3    100 tablet    TAKE 1 TABLET BY MOUTH ONCE DAILY    Vitamin D deficiency       COLACE PO      Take 200 mg by mouth 2 times daily        levothyroxine 50 MCG tablet    SYNTHROID/LEVOTHROID    30 tablet    TAKE 1 TABLET DAILY BY MOUTH - GENERIC FOR SYNTHROID    Hypothyroidism, unspecified type       SEROQUEL PO      Take 900 mg by mouth At Bedtime        simvastatin 40 MG tablet    ZOCOR    90 tablet    Take 1 tablet (40 mg) by mouth At Bedtime    Hyperlipidemia LDL goal <100       TYLENOL PO      Take 1,000 mg by mouth every 6 hours as needed for mild pain or fever

## 2018-09-11 NOTE — MR AVS SNAPSHOT
After Visit Summary   9/11/2018    Christiano Leon    MRN: [id number]           Patient Information     Date Of Birth          1945        Visit Information        Provider Department      9/11/2018 10:00 AM Angela Nowak AuD [facility]        Today's Diagnoses     Impacted cerumen of left ear    -  1       Follow-ups after your visit        Additional Services     OTOLARYNGOLOGY REFERRAL       Your provider has referred you to:ENT    Please be aware that coverage of these services is subject to the terms and limitations of your health insurance plan.  Call member services at your health plan with any benefit or coverage questions.      Please bring the following to your appointment:  >>   Any x-rays, CTs or MRIs which have been performed.  Contact the facility where they were done to arrange for  prior to your scheduled appointment.  Any new CT, MRI or other procedures ordered by your specialist must be performed at a [facility] facility or coordinated by your clinic's referral office.    >>   List of current medications   >>   This referral request   >>   Any documents/labs given to you for this referral                  Your next 10 appointments already scheduled     Sep 11, 2018 10:40 AM CDT   (Arrive by 10:20 AM)   New Visit with Alfred Rose DO    ORTHOPEDICS ([facility] [place] )    [address]  [facility] [address] [phone]            Sep 14, 2018  8:30 AM CDT   (Arrive by 8:15 AM)   New Visit with ZANE Rodriguez CNP   [facility] ([facility] )    [address]  [place] [address]   [phone]            Oct 04, 2018  8:15 AM CDT   (Arrive by 8:00 AM)   Office Visit with ROBERT Akbar MD   [facility]    [address]  [place] [address]   [phone]           Bring a current list of meds and any records pertaining to this visit.  For  "Physicals, please bring immunization records and any forms needing to be filled out.  Please arrive 15 minutes early to complete paperwork and register.              Who to contact     If you have questions or need follow up information about today's clinic visit or your schedule please contact [facility] directly at [phone].  Normal or non-critical lab and imaging results will be communicated to you by MyChart, letter or phone within 4 business days after the clinic has received the results. If you do not hear from us within 7 days, please contact the clinic through MyChart or phone. If you have a critical or abnormal lab result, we will notify you by phone as soon as possible.  Submit refill requests through Onarbor or call your pharmacy and they will forward the refill request to us. Please allow 3 business days for your refill to be completed.          Additional Information About Your Visit        MyChart Information     Onarbor lets you send messages to your doctor, view your test results, renew your prescriptions, schedule appointments and more. To sign up, go to [email or web address] . Click on \"Log in\" on the left side of the screen, which will take you to the Welcome page. Then click on \"Sign up Now\" on the right side of the page.     You will be asked to enter the access code listed below, as well as some personal information. Please follow the directions to create your username and password.     Your access code is: [email or web address]  Expires: 12/10/2018 10:30 AM     Your access code will  in 90 days. If you need help or a new code, please call your [place] clinic or [phone].        Care EveryWhere ID     This is your Care EveryWhere ID. This could be used by other organizations to access your [place] medical records  [id number]         Blood Pressure from Last 3 Encounters:   18 123/68   18 106/66   18 102/68    Weight from Last 3 Encounters:   18 " 164 lb (74.4 kg)   08/14/18 164 lb (74.4 kg)   08/09/18 164 lb (74.4 kg)              We Performed the Following     AUDIOGRAM/TYMPANOGRAM - INTERFACE     OTOLARYNGOLOGY REFERRAL        Primary Care Provider Office Phone # Fax #    R Dustin Akbar [phone] [address] [address]        Equal Access to Services     ANA CARDONA : Hadii aad ku hadasho Soomaali, waaxda luqadaha, qaybta kaalmada adeegyada, waxay idiin hayaan adeeg khnomansh lajosen . So [facility] [phone].    ATENCIÓN: Si snowla dru, tiene a pierce disposición servicios gratuitos de asistencia lingüística. Llame al [phone].    We comply with applicable federal civil rights laws and [place] laws. We do not discriminate on the basis of race, color, national origin, age, disability, sex, sexual orientation, or gender identity.            Thank you!     Thank you for choosing [facility]  for your care. Our goal is always to provide you with excellent care. Hearing back from our patients is one way we can continue to improve our services. Please take a few minutes to complete the written survey that you may receive in the mail after your visit with us. Thank you!             Your Updated Medication List - Protect others around you: Learn how to safely use, store and throw away your medicines at www.disposemymeds.org.          This list is accurate as of 9/11/18 10:30 AM.  Always use your most recent med list.                   Brand Name Dispense Instructions for use Diagnosis    ARTIFICIAL TEAR OP      Apply 1 drop to eye 4 times daily        cholecalciferol 1000 UNIT tablet    vitamin D3    100 tablet    TAKE 1 TABLET BY MOUTH ONCE DAILY    Vitamin D deficiency        MG capsule   Generic drug:  docusate sodium     100 capsule    TAKE 2 CAPSULES (200MG) BY MOUTH TWICE DAILY    Constipation, unspecified constipation type       levothyroxine 50 MCG tablet    SYNTHROID/LEVOTHROID    30 tablet     TAKE 1 TABLET DAILY BY MOUTH - GENERIC FOR SYNTHROID    Hypothyroidism, unspecified type       SEROQUEL PO      Take 900 mg by mouth At Bedtime        simvastatin 40 MG tablet    ZOCOR    90 tablet    Take 1 tablet (40 mg) by mouth At Bedtime    Hyperlipidemia LDL goal <100       TYLENOL PO      Take 1,000 mg by mouth every 6 hours as needed for mild pain or fever

## 2018-09-14 ENCOUNTER — OFFICE VISIT (OUTPATIENT)
Dept: OTOLARYNGOLOGY | Facility: OTHER | Age: 73
End: 2018-09-14
Attending: NURSE PRACTITIONER
Payer: MEDICARE

## 2018-09-14 VITALS
DIASTOLIC BLOOD PRESSURE: 70 MMHG | OXYGEN SATURATION: 94 % | TEMPERATURE: 97 F | HEIGHT: 71 IN | SYSTOLIC BLOOD PRESSURE: 120 MMHG | BODY MASS INDEX: 22.82 KG/M2 | HEART RATE: 66 BPM | WEIGHT: 163 LBS

## 2018-09-14 DIAGNOSIS — H61.22 IMPACTED CERUMEN OF LEFT EAR: ICD-10-CM

## 2018-09-14 DIAGNOSIS — H91.90 HEARING LOSS, UNSPECIFIED HEARING LOSS TYPE, UNSPECIFIED LATERALITY: Primary | ICD-10-CM

## 2018-09-14 PROCEDURE — 92504 EAR MICROSCOPY EXAMINATION: CPT | Performed by: NURSE PRACTITIONER

## 2018-09-14 PROCEDURE — G0463 HOSPITAL OUTPT CLINIC VISIT: HCPCS

## 2018-09-14 PROCEDURE — 92504 EAR MICROSCOPY EXAMINATION: CPT

## 2018-09-14 PROCEDURE — 99213 OFFICE O/P EST LOW 20 MIN: CPT | Mod: 25 | Performed by: NURSE PRACTITIONER

## 2018-09-14 ASSESSMENT — PAIN SCALES - GENERAL: PAINLEVEL: NO PAIN (0)

## 2018-09-14 NOTE — NURSING NOTE
"Chief Complaint   Patient presents with     Cerumen Impaction     Pt is here for a f/u ear cleaning.       Initial /70 (BP Location: Left arm, Cuff Size: Adult Regular)  Pulse 66  Temp 97  F (36.1  C) (Tympanic)  Ht 5' 11\" (1.803 m)  Wt 163 lb (73.9 kg)  SpO2 94%  BMI 22.73 kg/m2 Estimated body mass index is 22.73 kg/(m^2) as calculated from the following:    Height as of this encounter: 5' 11\" (1.803 m).    Weight as of this encounter: 163 lb (73.9 kg).  Medication Reconciliation: complete    Kim Long LPN    "

## 2018-09-14 NOTE — LETTER
"    9/14/2018         RE: Christiano Leon  [address]  [place] [address]        Dear Colleague,    [facility] Please see a copy of my visit note below.    Otolaryngology Progress Note           Chief Complaint:     Patient presents with:  Cerumen Impaction: Pt is here for a f/u ear cleaning.         History of Present Illness:     Christiano Leon is a 73 year old male here to f/u on ear cleaning. I last saw him 8/22/18 to finish debridement of cerumen left ear.     He recent saw audiology 9/11/18 who performed Tympanograms, Type A right and TypeAs left, noting cerumen on left TM so she referred him back to see me.    Chirstiano is here today with his worker. He has no concerns with otalgia/otorrhea. Just a gradual slow decline in hearing over the years. No fluctuating hearing loss.   No tinnitus/vertigo.  No facial paresthesias or dysphagia.          Review of Systems:     See HPI         Physical Exam:     /70 (BP Location: Left arm, Cuff Size: Adult Regular)  Pulse 66  Temp 97  F (36.1  C) (Tympanic)  Ht 5' 11\" (1.803 m)  Wt 163 lb (73.9 kg)  SpO2 94%  BMI 22.73 kg/m2  General - The patient is well nourished and well developed, and appears to have good nutritional status.  Alert and oriented to person and place, interactive.  Head and Face - Normocephalic and atraumatic, with no gross asymmetry noted of the contour of the facial features.  The facial nerve is intact, with strong symmetric movements.  Neck-no palpable lymphadenopathy or thyroid mass.  Trachea is midline.  Eyes - Extraocular movements intact.   Ears- External ears normal. Ears examined under microscope. Right EAC patent, TM intact. Left EAC patent, TM with thin layer of sticky cerumen on posterior TM. After multiple attempts with gentle saline irrigation and use of cerumen loop, cupped forceps and #3 suction, was able to get some cerumen off, but some remains on " posterior TM. From what I can visualize, TM is intact without effusion.   Nose - Nasal mucosa is pink and moist with no abnormal mucus.  The septum was grossly midline and non-obstructive, turbinates of normal size and position.  No polyps, masses, or purulence noted on examination.  Mouth - Examination of the oral cavity shows pink, healthy, moist mucosa. Dentition in poor condition, many missing. No lesions or ulceration noted. The tongue is mobile and midline.    Throat - The walls of the oropharynx were smooth, pink, moist, symmetric, and had no lesions or ulcerations.  The tonsillar pillars and soft palate were symmetric.  The uvula was midline on elevation.           Assessment and Plan:       ICD-10-CM    1. Hearing loss, unspecified hearing loss type, unspecified laterality H91.90    2. Impacted cerumen of left ear H61.22      Further debridement of left ear.  Very thin layer of cerumen remains on posterior left TM.    Attempt upcoming audiology appointment for evaluation of hearing loss.    Return to ENT as needed.     Carmen Kay NP  ENT  [facility]  [phone]      Again, thank you for allowing me to participate in the care of your patient.        Sincerely,        Carmen Kay, ZANE CNP

## 2018-09-14 NOTE — PROGRESS NOTES
"Otolaryngology Progress Note           Chief Complaint:     Patient presents with:  Cerumen Impaction: Pt is here for a f/u ear cleaning.         History of Present Illness:     Christiano Leon is a 73 year old male here to f/u on ear cleaning. I last saw him 8/22/18 to finish debridement of cerumen left ear.     He recent saw audiology 9/11/18 who performed Tympanograms, Type A right and TypeAs left, noting cerumen on left TM so she referred him back to see me.    Christiano is here today with his worker. He has no concerns with otalgia/otorrhea. Just a gradual slow decline in hearing over the years. No fluctuating hearing loss.   No tinnitus/vertigo.  No facial paresthesias or dysphagia.          Review of Systems:     See HPI         Physical Exam:     /70 (BP Location: Left arm, Cuff Size: Adult Regular)  Pulse 66  Temp 97  F (36.1  C) (Tympanic)  Ht 5' 11\" (1.803 m)  Wt 163 lb (73.9 kg)  SpO2 94%  BMI 22.73 kg/m2  General - The patient is well nourished and well developed, and appears to have good nutritional status.  Alert and oriented to person and place, interactive.  Head and Face - Normocephalic and atraumatic, with no gross asymmetry noted of the contour of the facial features.  The facial nerve is intact, with strong symmetric movements.  Neck-no palpable lymphadenopathy or thyroid mass.  Trachea is midline.  Eyes - Extraocular movements intact.   Ears- External ears normal. Ears examined under microscope. Right EAC patent, TM intact. Left EAC patent, TM with thin layer of sticky cerumen on posterior TM. After multiple attempts with gentle saline irrigation and use of cerumen loop, cupped forceps and #3 suction, was able to get some cerumen off, but some remains on posterior TM. From what I can visualize, TM is intact without effusion.   Nose - Nasal mucosa is pink and moist with no abnormal mucus.  The septum was grossly midline and non-obstructive, turbinates of normal size and position.  No polyps, " masses, or purulence noted on examination.  Mouth - Examination of the oral cavity shows pink, healthy, moist mucosa. Dentition in poor condition, many missing. No lesions or ulceration noted. The tongue is mobile and midline.    Throat - The walls of the oropharynx were smooth, pink, moist, symmetric, and had no lesions or ulcerations.  The tonsillar pillars and soft palate were symmetric.  The uvula was midline on elevation.           Assessment and Plan:       ICD-10-CM    1. Hearing loss, unspecified hearing loss type, unspecified laterality H91.90    2. Impacted cerumen of left ear H61.22      Further debridement of left ear.  Very thin layer of cerumen remains on posterior left TM.    Attempt upcoming audiology appointment for evaluation of hearing loss.    Return to ENT as needed.     Carmen Kay NP  ENT  [facility]  [phone]

## 2018-09-14 NOTE — PATIENT INSTRUCTIONS
Keep upcoming audiology appointment.    Return to ENT as needed.      Thank you for allowing Carmen Kay CNP and our ENT team to participate in your care.  If your medications are too expensive, please give the nurse a call.  We can possibly change this medication.  If you have a scheduling or an appointment question please contact [facility] Unit Coordinator at their direct line [phone].   ALL nursing questions or concerns can be directed to your ENT nurse at: [phone]- Gigi

## 2018-09-14 NOTE — MR AVS SNAPSHOT
After Visit Summary   9/14/2018    Christiano Leon    MRN: [id number]           Patient Information     Date Of Birth          1945        Visit Information        Provider Department      9/14/2018 8:30 AM Carmen Kay APRN CNP [facility]        Today's Diagnoses     Impacted cerumen of left ear          Care Instructions    Keep upcoming audiology appointment.    Return to ENT as needed.      Thank you for allowing Carmen Kay CNP and our ENT team to participate in your care.  If your medications are too expensive, please give the nurse a call.  We can possibly change this medication.  If you have a scheduling or an appointment question please contact [facility] at their direct line [phone].   ALL nursing questions or concerns can be directed to your ENT nurse at: [phone]- Ijnv              Follow-ups after your visit        Follow-up notes from your care team     Return if symptoms worsen or fail to improve.      Your next 10 appointments already scheduled     Sep 17, 2018  2:30 PM CDT   (Arrive by 2:15 PM)   Evaluation with Ilsa Brizuela, PT   [facility] ([facility] )    [address] [address]   [phone]            Sep 24, 2018  3:00 PM CDT   Treatment with Ilsa Brizuela PT   [facility] ([facility] )    [address] [address]   [phone]            Sep 25, 2018  3:00 PM CDT   (Arrive by 2:45 PM)   Hearing Eval with Jonelle Lawton   [facility] ([facility] )    [address]  [facility] [address]   [phone]            Oct 01, 2018  2:30 PM CDT   Treatment with Lety North PTA   [facility] ([facility] )    [address] [address]   [phone]            Oct 04, 2018  8:15 AM CDT   (Arrive by 8:00 AM)   Office Visit with ROBERT Akbar MD   [facility]  "([facility] )    [place] [address]   [phone]           Bring a current list of meds and any records pertaining to this visit.  For Physicals, please bring immunization records and any forms needing to be filled out.  Please arrive 15 minutes early to complete paperwork and register.              Who to contact     If you have questions or need follow up information about today's clinic visit or your schedule please contact [facility] directly at [phone].  Normal or non-critical lab and imaging results will be communicated to you by MyChart, letter or phone within 4 business days after the clinic has received the results. If you do not hear from us within 7 days, please contact the clinic through [employer] or phone. If you have a critical or abnormal lab result, we will notify you by phone as soon as possible.  Submit refill requests through Pavlok or call your pharmacy and they will forward the refill request to us. Please allow 3 business days for your refill to be completed.          Additional Information About Your Visit        [facility] Hospital[facility] Software Information     Pavlok lets you send messages to your doctor, view your test results, renew your prescriptions, schedule appointments and more. To sign up, go to [email or web address] . Click on \"Log in\" on the left side of the screen, which will take you to the Welcome page. Then click on \"Sign up Now\" on the right side of the page.     You will be asked to enter the access code listed below, as well as some personal information. Please follow the directions to create your username and password.     Your access code is: [email or web address]  Expires: 12/10/2018 10:30 AM     Your access code will  in 90 days. If you need help or a new code, please call your [facility] or [phone].        Care EveryWhere ID     This is your Care EveryWhere ID. This could be used by other organizations to access your [place] " "medical records  [id number]        Your Vitals Were     Pulse Temperature Height Pulse Oximetry BMI (Body Mass Index)       66 97  F (36.1  C) (Tympanic) 5' 11\" (1.803 m) 94% 22.73 kg/m2        Blood Pressure from Last 3 Encounters:   09/14/18 120/70   09/11/18 100/66   08/22/18 123/68    Weight from Last 3 Encounters:   09/14/18 163 lb (73.9 kg)   09/11/18 164 lb (74.4 kg)   08/22/18 164 lb (74.4 kg)              Today, you had the following     No orders found for display       Primary Care Provider Office Phone # Fax #    R Dustin Akbar [phone] [address] [facility] [address]        Equal Access to Services     [facility] DULCE : Keyona russo Soolivia, waaxdarrion luqadaha, qaybta kaalmada travis, stoney roberson . So [facility] [phone].    ATENCIÓN: Si habla español, tiene a pierce disposición servicios gratuitos de asistencia lingüística. Anabel al [phone].    We comply with applicable federal civil rights laws and [place] laws. We do not discriminate on the basis of race, color, national origin, age, disability, sex, sexual orientation, or gender identity.            Thank you!     Thank you for choosing [facility]  for your care. Our goal is always to provide you with excellent care. Hearing back from our patients is one way we can continue to improve our services. Please take a few minutes to complete the written survey that you may receive in the mail after your visit with us. Thank you!             Your Updated Medication List - Protect others around you: Learn how to safely use, store and throw away your medicines at www.disposemymeds.org.          This list is accurate as of 9/14/18  9:05 AM.  Always use your most recent med list.                   Brand Name Dispense Instructions for use Diagnosis    cholecalciferol 1000 UNIT tablet    vitamin D3    100 tablet    TAKE 1 TABLET BY MOUTH ONCE DAILY    Vitamin D deficiency       " COLACE PO      Take 200 mg by mouth 2 times daily        levothyroxine 50 MCG tablet    SYNTHROID/LEVOTHROID    30 tablet    TAKE 1 TABLET DAILY BY MOUTH - GENERIC FOR SYNTHROID    Hypothyroidism, unspecified type       SEROQUEL PO      Take 900 mg by mouth At Bedtime        simvastatin 40 MG tablet    ZOCOR    90 tablet    Take 1 tablet (40 mg) by mouth At Bedtime    Hyperlipidemia LDL goal <100       TYLENOL PO      Take 1,000 mg by mouth every 6 hours as needed for mild pain or fever

## 2018-09-17 ENCOUNTER — HOSPITAL ENCOUNTER (OUTPATIENT)
Dept: PHYSICAL THERAPY | Facility: HOSPITAL | Age: 73
Setting detail: THERAPIES SERIES
End: 2018-09-17
Attending: ORTHOPAEDIC SURGERY
Payer: MEDICARE

## 2018-09-17 PROCEDURE — G8985 CARRY GOAL STATUS: HCPCS | Mod: GP,CI

## 2018-09-17 PROCEDURE — 40000718 ZZHC STATISTIC PT DEPARTMENT ORTHO VISIT

## 2018-09-17 PROCEDURE — G8984 CARRY CURRENT STATUS: HCPCS | Mod: GP,CL

## 2018-09-17 PROCEDURE — 97161 PT EVAL LOW COMPLEX 20 MIN: CPT | Mod: GP

## 2018-09-17 PROCEDURE — 97110 THERAPEUTIC EXERCISES: CPT | Mod: GP
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
## 2018-09-24 ENCOUNTER — HOSPITAL ENCOUNTER (OUTPATIENT)
Dept: PHYSICAL THERAPY | Facility: HOSPITAL | Age: 73
Setting detail: THERAPIES SERIES
End: 2018-09-24
Attending: ORTHOPAEDIC SURGERY
Payer: MEDICARE

## 2018-09-24 PROCEDURE — 97110 THERAPEUTIC EXERCISES: CPT | Mod: GP

## 2018-09-24 PROCEDURE — 40000718 ZZHC STATISTIC PT DEPARTMENT ORTHO VISIT

## 2018-09-25 ENCOUNTER — OFFICE VISIT (OUTPATIENT)
Dept: AUDIOLOGY | Facility: OTHER | Age: 73
End: 2018-09-25
Attending: AUDIOLOGIST
Payer: MEDICARE

## 2018-09-25 DIAGNOSIS — H69.92 DYSFUNCTION OF LEFT EUSTACHIAN TUBE: ICD-10-CM

## 2018-09-25 DIAGNOSIS — H90.3 SENSORINEURAL HEARING LOSS, BILATERAL: ICD-10-CM

## 2018-09-25 PROCEDURE — 92567 TYMPANOMETRY: CPT | Performed by: AUDIOLOGIST

## 2018-09-25 PROCEDURE — 92557 COMPREHENSIVE HEARING TEST: CPT | Performed by: AUDIOLOGIST

## 2018-09-25 NOTE — MR AVS SNAPSHOT
After Visit Summary   9/25/2018    Christiano Leon    MRN: [id number]           Patient Information     Date Of Birth          1945        Visit Information        Provider Department      9/25/2018 3:00 PM Angela Nowak AuD [facility]        Today's Diagnoses     Sensorineural hearing loss, bilateral        Dysfunction of left eustachian tube           Follow-ups after your visit        Your next 10 appointments already scheduled     Oct 01, 2018  1:00 PM CDT   Treatment with Ilsa Brizuela PT   [facility] ([facility] )    [address] [address]   [phone]            Oct 02, 2018  9:15 AM CDT   (Arrive by 9:00 AM)   Hearing Aid Consult with Jonelle Lawton   [facility]    [address] [facility] [address]   [phone]            Oct 04, 2018  8:15 AM CDT   (Arrive by 8:00 AM)   Office Visit with ROBERT Akbar MD   [facility] ([facility] )    [address] [facility] [address]   [phone]           Bring a current list of meds and any records pertaining to this visit.  For Physicals, please bring immunization records and any forms needing to be filled out.  Please arrive 15 minutes early to complete paperwork and register.            Oct 11, 2018  1:00 PM CDT   Treatment with Ilsa Brizuela PT   [facility] ([facility] )    [address] [address]   [phone]              Who to contact     If you have questions or need follow up information about today's clinic visit or your schedule please contact [facility] [place] directly at [phone].  Normal or non-critical lab and imaging results will be communicated to you by MyChart, letter or phone within 4 business days after the clinic has received the results. If you do not hear from us within 7 days, please  "contact the clinic through Bitbrains or phone. If you have a critical or abnormal lab result, we will notify you by phone as soon as possible.  Submit refill requests through Bitbrains or call your pharmacy and they will forward the refill request to us. Please allow 3 business days for your refill to be completed.          Additional Information About Your Visit        [place] [employer] Information     Bitbrains lets you send messages to your doctor, view your test results, renew your prescriptions, schedule appointments and more. To sign up, go to [email or web address] . Click on \"Log in\" on the left side of the screen, which will take you to the Welcome page. Then click on \"Sign up Now\" on the right side of the page.     You will be asked to enter the access code listed below, as well as some personal information. Please follow the directions to create your username and password.     Your access code is: [email or web address]  Expires: 12/10/2018 10:30 AM     Your access code will  in 90 days. If you need help or a new code, please call your [place] clinic or [phone].        Care EveryWhere ID     This is your Care EveryWhere ID. This could be used by other organizations to access your [place] medical records  [id number]         Blood Pressure from Last 3 Encounters:   18 120/70   18 100/66   18 123/68    Weight from Last 3 Encounters:   18 163 lb (73.9 kg)   18 164 lb (74.4 kg)   18 164 lb (74.4 kg)              We Performed the Following     AUDIOGRAM/TYMPANOGRAM - INTERFACE        Primary Care Provider Office Phone # Fax #    R Dustin Akbar [phone]       [address] [place], [place] [id number]        Equal Access to Services     ANA CARDONA AH: Keyona Pedroza, wajulieth ann, qaybta kaalmastoney sanford. So [facility] [phone].    ATENCIÓN: Si habla español, tiene a pierce disposición servicios gratuitos de " asistencia lingüística. Anabel al [phone].    We comply with applicable federal civil rights laws and [place] laws. We do not discriminate on the basis of race, color, national origin, age, disability, sex, sexual orientation, or gender identity.            Thank you!     Thank you for choosing [facility]  for your care. Our goal is always to provide you with excellent care. Hearing back from our patients is one way we can continue to improve our services. Please take a few minutes to complete the written survey that you may receive in the mail after your visit with us. Thank you!             Your Updated Medication List - Protect others around you: Learn how to safely use, store and throw away your medicines at www.disposemymeds.org.          This list is accurate as of 9/25/18  3:14 PM.  Always use your most recent med list.                   Brand Name Dispense Instructions for use Diagnosis    cholecalciferol 1000 UNIT tablet    vitamin D3    100 tablet    TAKE 1 TABLET BY MOUTH ONCE DAILY    Vitamin D deficiency       COLACE PO      Take 200 mg by mouth 2 times daily        levothyroxine 50 MCG tablet    SYNTHROID/LEVOTHROID    30 tablet    TAKE 1 TABLET DAILY BY MOUTH - GENERIC FOR SYNTHROID    Hypothyroidism, unspecified type       SEROQUEL PO      Take 900 mg by mouth At Bedtime        simvastatin 40 MG tablet    ZOCOR    90 tablet    Take 1 tablet (40 mg) by mouth At Bedtime    Hyperlipidemia LDL goal <100       TYLENOL PO      Take 1,000 mg by mouth every 6 hours as needed for mild pain or fever

## 2018-09-25 NOTE — PROGRESS NOTES
Audiology Evaluation Completed. Please refer SCANNED AUDIOGRAM and/or TYMPANOGRAM for BACKGROUND, RESULTS, RECOMMENDATIONS.    Angela Nowak M.S., [facility]  Audiologist #[id number]

## 2018-09-26 ENCOUNTER — OFFICE VISIT (OUTPATIENT)
Dept: OTOLARYNGOLOGY | Facility: OTHER | Age: 73
End: 2018-09-26
Attending: NURSE PRACTITIONER
Payer: MEDICARE

## 2018-09-26 VITALS
OXYGEN SATURATION: 95 % | TEMPERATURE: 96.7 F | WEIGHT: 163 LBS | DIASTOLIC BLOOD PRESSURE: 68 MMHG | BODY MASS INDEX: 22.73 KG/M2 | SYSTOLIC BLOOD PRESSURE: 105 MMHG | HEART RATE: 74 BPM

## 2018-09-26 DIAGNOSIS — H90.3 ASNHL (ASYMMETRICAL SENSORINEURAL HEARING LOSS): ICD-10-CM

## 2018-09-26 DIAGNOSIS — H61.22 IMPACTED CERUMEN OF LEFT EAR: Primary | ICD-10-CM

## 2018-09-26 PROCEDURE — 92504 EAR MICROSCOPY EXAMINATION: CPT | Performed by: NURSE PRACTITIONER

## 2018-09-26 PROCEDURE — 99213 OFFICE O/P EST LOW 20 MIN: CPT | Mod: 25 | Performed by: NURSE PRACTITIONER

## 2018-09-26 PROCEDURE — G0463 HOSPITAL OUTPT CLINIC VISIT: HCPCS

## 2018-09-26 ASSESSMENT — PAIN SCALES - GENERAL: PAINLEVEL: NO PAIN (0)

## 2018-09-26 NOTE — LETTER
9/26/2018         RE: Christiano Leon  [address]  [place] [address]        Dear Colleague,    Thank you for referring your patient, Christiano Leon, to the [facility]. Please see a copy of my visit note below.    Otolaryngology Progress Note           Chief Complaint:     Patient presents with:  Consult For: hearing aide medical clearance          History of Present Illness:     Christiano Leon is here today for hearing aide clearance. Hearing loss has been gradual over the years with no acute changes. No fluctuating hearing loss.  I have been working on cerumen debridement with him and was able to successfully debride right ear, but some cerumen remains anterior left TM, despite use of drops and multiple attempts at debridement.   No otalgia, otorrhea or aura fullness.  Vertigo: denies  Tinnitus: denies  There is no facial weakness, facial numbness or dysphagia.  No COM, otologic surgeries or trauma.    Audiogram 9/25/18:   Tympanogram left As.  Right ear slight sloping to moderate/severe SNHL, SRT 45 dBHL, 100% WRS  Left ear moderate conductive hearing loss at 250 Hz, otherwise moderate sloping to severe ASNHL, SRT 45 dBHL, WRS 88%  SRT = PTA  Hearing aide candidacy.         Review of Systems:     See HPI         Physical Exam:     /68  Pulse 74  Temp 96.7  F (35.9  C) (Tympanic)  Wt 163 lb (73.9 kg)  SpO2 95%  BMI 22.73 kg/m2  General - The patient is well nourished and well developed, and appears to have good nutritional status.  Alert and oriented to person and place, interactive.  Head and Face - Normocephalic and atraumatic, with no gross asymmetry noted of the contour of the facial features.  The facial nerve is intact, with strong symmetric movements.  Neck-no palpable lymphadenopathy or thyroid mass.  Trachea is midline.  Eyes - Extraocular movements intact.   Ears- External ears normal. Ears examined under microscope. Left EAC clear, TM with cerumen anterior TM  obstructing view of this area, otherwise remaining TM intact without effusion/retraction. Right EAC clear, TM intact without effusion/retraction.   Nose - Nasal mucosa is pink and moist with no abnormal mucus. The septum was grossly midline and non-obstructive, turbinates of normal size and position.  No polyps, masses, or purulence noted on examination.  Mouth - Examination of the oral cavity shows pink, healthy, moist mucosa. Dentition in poor condition. No lesions or ulceration noted. The tongue is mobile and midline.    Throat - The walls of the oropharynx were smooth, pink, moist, symmetric, and had no lesions or ulcerations.  The tonsillar pillars and soft palate were symmetric.  The uvula was midline on elevation.           Assessment and Plan:       ICD-10-CM    1. Impacted cerumen of left ear H61.22 carbamide peroxide (DEBROX) 6.5 % otic solution   2. ASNHL (asymmetrical sensorineural hearing loss) H90.5 MR Internal Auditory Canal wo&w Contrast     The possible etiologies for this were discussed.  They include medication, infection, trauma or neoplasm.  Infrequently a sensorineural hearing loss will occur during or soon after a viral upper respiratory infection. I  discussed the indication for MRI of the Brain and Internal Auditory Canals.  The possibility of acoustic neuroma causing asymmetrical nerve hearing loss was discussed.  The patient was told acoustic neuromas are very rare, benign, and generally treated by observation only.     Christiano wants an MRI of the IAC done.  If normal, will give medical clearance for hearing aides and recommend annual audiogram, sooner with any acute hearing changes.    Debrox drops to left ear monthly as directed x 6 months.    Carmen Kay NP  ENT  [facility]  [phone]      Again, thank you for allowing me to participate in the care of your patient.        Sincerely,        Carmen Kay, ZANE CNP

## 2018-09-26 NOTE — PROGRESS NOTES
Otolaryngology Progress Note           Chief Complaint:     Patient presents with:  Consult For: hearing aide medical clearance          History of Present Illness:     Christiano Leon is here today for hearing aide clearance. Hearing loss has been gradual over the years with no acute changes. No fluctuating hearing loss.  I have been working on cerumen debridement with him and was able to successfully debride right ear, but some cerumen remains anterior left TM, despite use of drops and multiple attempts at debridement.   No otalgia, otorrhea or aura fullness.  Vertigo: denies  Tinnitus: denies  There is no facial weakness, facial numbness or dysphagia.  No COM, otologic surgeries or trauma.    Audiogram 9/25/18:   Tympanogram left As.  Right ear slight sloping to moderate/severe SNHL, SRT 45 dBHL, 100% WRS  Left ear moderate conductive hearing loss at 250 Hz, otherwise moderate sloping to severe ASNHL, SRT 45 dBHL, WRS 88%  SRT = PTA  Hearing aide candidacy.         Review of Systems:     See HPI         Physical Exam:     /68  Pulse 74  Temp 96.7  F (35.9  C) (Tympanic)  Wt 163 lb (73.9 kg)  SpO2 95%  BMI 22.73 kg/m2  General - The patient is well nourished and well developed, and appears to have good nutritional status.  Alert and oriented to person and place, interactive.  Head and Face - Normocephalic and atraumatic, with no gross asymmetry noted of the contour of the facial features.  The facial nerve is intact, with strong symmetric movements.  Neck-no palpable lymphadenopathy or thyroid mass.  Trachea is midline.  Eyes - Extraocular movements intact.   Ears- External ears normal. Ears examined under microscope. Left EAC clear, TM with cerumen anterior TM obstructing view of this area, otherwise remaining TM intact without effusion/retraction. Right EAC clear, TM intact without effusion/retraction.   Nose - Nasal mucosa is pink and moist with no abnormal mucus. The septum was grossly midline and  non-obstructive, turbinates of normal size and position.  No polyps, masses, or purulence noted on examination.  Mouth - Examination of the oral cavity shows pink, healthy, moist mucosa. Dentition in poor condition. No lesions or ulceration noted. The tongue is mobile and midline.    Throat - The walls of the oropharynx were smooth, pink, moist, symmetric, and had no lesions or ulcerations.  The tonsillar pillars and soft palate were symmetric.  The uvula was midline on elevation.           Assessment and Plan:       ICD-10-CM    1. Impacted cerumen of left ear H61.22 carbamide peroxide (DEBROX) 6.5 % otic solution   2. ASNHL (asymmetrical sensorineural hearing loss) H90.5 MR Internal Auditory Canal wo&w Contrast     The possible etiologies for this were discussed.  They include medication, infection, trauma or neoplasm.  Infrequently a sensorineural hearing loss will occur during or soon after a viral upper respiratory infection. I  discussed the indication for MRI of the Brain and Internal Auditory Canals.  The possibility of acoustic neuroma causing asymmetrical nerve hearing loss was discussed.  The patient was told acoustic neuromas are very rare, benign, and generally treated by observation only.     Christiano wants an MRI of the IAC done.  If normal, will give medical clearance for hearing aides and recommend annual audiogram, sooner with any acute hearing changes.    Debrox drops to left ear monthly as directed x 6 months.    Carmen Kay NP  ENT  [facility], [place]  [phone]

## 2018-09-26 NOTE — NURSING NOTE
"Chief Complaint   Patient presents with     Consult For     hearing aide medical clearance        Initial /68  Pulse 74  Temp 96.7  F (35.9  C) (Tympanic)  Wt 163 lb (73.9 kg)  SpO2 95%  BMI 22.73 kg/m2 Estimated body mass index is 22.73 kg/(m^2) as calculated from the following:    Height as of 9/14/18: 5' 11\" (1.803 m).    Weight as of this encounter: 163 lb (73.9 kg).  Medication Reconciliation: complete    Perri Zamora LPN    "

## 2018-09-26 NOTE — PATIENT INSTRUCTIONS
Thank you for allowing Carmen Kay CNP and our ENT team to participate in your care.  If your medications are too expensive, please give the nurse a call.  We can possibly change this medication.  If you have a scheduling or an appointment question please contact [facility] Unit Coordinator at their direct line [phone].   ALL nursing questions or concerns can be directed to your ENT nurse at: [phone] - Gigi Pereira as directed.    Complete MRI and will call with results.    Will give medical clearance for hearing aides pending MRI results.

## 2018-09-26 NOTE — MR AVS SNAPSHOT
After Visit Summary   9/26/2018    Christiano Leon    MRN: [id number]           Patient Information     Date Of Birth          1945        Visit Information        Provider Department      9/26/2018 1:00 PM Carmen Kay APRN CNP [facility]        Today's Diagnoses     Impacted cerumen of left ear    -  1      Care Instructions    Thank you for allowing Carmen Kay CNP and our ENT team to participate in your care.  If your medications are too expensive, please give the nurse a call.  We can possibly change this medication.  If you have a scheduling or an appointment question [facility] at their direct line [phone].   ALL nursing questions or concerns can be directed to your ENT nurse at: [phone] - Gigi Pereira as directed.    Complete MRI and will call with results.    Will give medical clearance for hearing aides pending MRI results.           Follow-ups after your visit        Follow-up notes from your care team     Return if symptoms worsen or fail to improve.      Your next 10 appointments already scheduled     Oct 01, 2018  1:00 PM CDT   Treatment with Ilsa Brizuela, PT   [facility] ([facility] )    [address] [place] [address]   [phone]            Oct 02, 2018  9:15 AM CDT   (Arrive by 9:00 AM)   Hearing Aid Consult with Jonelle Lawton   [facility]    [address] [place] [address]   [phone]            Oct 04, 2018  8:15 AM CDT   (Arrive by 8:00 AM)   Office Visit with ROBERT Akbar MD   [facility] )    [address] [place] [address]   [phone]           Bring a current list of meds and any records pertaining to this visit.  For Physicals, please bring immunization records and any forms needing to be filled out.  Please arrive 15  "minutes early to complete paperwork and register.            Oct 11, 2018  1:00 PM CDT   Treatment with Ilsa Brizuela, PT   [facility] ([facility] )    [address] [place] [address]   [phone]            Oct 26, 2018 10:15 AM CDT   (Arrive by 10:00 AM)   Hearing Aid Fitting with Jonelle Lawton   [facility]    [address]  [facility] [address]   [phone]            Nov 06, 2018  8:15 AM CST   (Arrive by 8:00 AM)   Return Visit with Jonelle Lawton   [facility] )    [address] [facility] [address]   [phone]              Who to contact     If you have questions or need follow up information about today's clinic visit or your schedule please contact [facility] directly at [phone].  Normal or non-critical lab and imaging results will be communicated to you by [employer] Powerhart, letter or phone within 4 business days after the clinic has received the results. If you do not hear from us within 7 days, please contact the clinic through [employer] Powerhart or phone. If you have a critical or abnormal lab result, we will notify you by phone as soon as possible.  Submit refill requests through [employer] or call your pharmacy and they will forward the refill request to us. Please allow 3 business days for your refill to be completed.          Additional Information About Your Visit        [employer] Information     [employer] lets you send messages to your doctor, view your test results, renew your prescriptions, schedule appointments and more. To sign up, go to [email or web address] . Click on \"Log in\" on the left side of the screen, which will take you to the Welcome page. Then click on \"Sign up Now\" on the right side of the page.     You will be asked to enter the access code listed below, as well as some personal information. Please follow the " directions to create your username and password.     Your access code is: [email or web address]  Expires: 12/10/2018 10:30 AM     Your access code will  in 90 days. If you need help or a new code, please call your [place] clinic or [phone].        Care EveryWhere ID     This is your Care EveryWhere ID. This could be used by other organizations to access your [place] medical records  [id number]        Your Vitals Were     Pulse Temperature Pulse Oximetry BMI (Body Mass Index)          74 96.7  F (35.9  C) (Tympanic) 95% 22.73 kg/m2         Blood Pressure from Last 3 Encounters:   18 105/68   18 120/70   18 100/66    Weight from Last 3 Encounters:   18 163 lb (73.9 kg)   18 163 lb (73.9 kg)   18 164 lb (74.4 kg)              Today, you had the following     No orders found for display         Today's Medication Changes          These changes are accurate as of 18  1:27 PM.  If you have any questions, ask your nurse or doctor.               Start taking these medicines.        Dose/Directions    carbamide peroxide 6.5 % otic solution   Commonly known as:  DEBROX   Used for:  Impacted cerumen of left ear   Started by:  Carmen Kay APRN CNP        Dose:  5 drop   Place 5 drops Into the left ear 2 times daily Do this once a month for 4 days each time, for the next 6 months   Quantity:  30 mL   Refills:  0            Where to get your medicines      These medications were sent to [id number] - [place], [place] - [address] [id number] [facility] [address]     Phone:  [phone]     carbamide peroxide 6.5 % otic solution                Primary Care Provider Office Phone # Fax #    R Dustin Akbar [phone] [address] [facility] [address]        Equal Access to Services     KATELYN CARDONA AH: Keyona Pedroza, alden luqadaha, qaybemilee kaalesme robles, stoney green. So rufino  [phone].    ATENCIÓN: Si ana maria gonsales, tiene a pierce disposición servicios gratuitos de asistencia lingüística. Anabel fernandez [phone].    We comply with applicable federal civil rights laws and [place] laws. We do not discriminate on the basis of race, color, national origin, age, disability, sex, sexual orientation, or gender identity.            Thank you!     Thank you for choosing [facility]  for your care. Our goal is always to provide you with excellent care. Hearing back from our patients is one way we can continue to improve our services. Please take a few minutes to complete the written survey that you may receive in the mail after your visit with us. Thank you!             Your Updated Medication List - Protect others around you: Learn how to safely use, store and throw away your medicines at www.disposemymeds.org.          This list is accurate as of 9/26/18  1:27 PM.  Always use your most recent med list.                   Brand Name Dispense Instructions for use Diagnosis    carbamide peroxide 6.5 % otic solution    DEBROX    30 mL    Place 5 drops Into the left ear 2 times daily Do this once a month for 4 days each time, for the next 6 months    Impacted cerumen of left ear       cholecalciferol 1000 UNIT tablet    vitamin D3    100 tablet    TAKE 1 TABLET BY MOUTH ONCE DAILY    Vitamin D deficiency       COLACE PO      Take 200 mg by mouth 2 times daily        levothyroxine 50 MCG tablet    SYNTHROID/LEVOTHROID    30 tablet    TAKE 1 TABLET DAILY BY MOUTH - GENERIC FOR SYNTHROID    Hypothyroidism, unspecified type       SEROQUEL PO      Take 900 mg by mouth At Bedtime        simvastatin 40 MG tablet    ZOCOR    90 tablet    Take 1 tablet (40 mg) by mouth At Bedtime    Hyperlipidemia LDL goal <100       TYLENOL PO      Take 1,000 mg by mouth every 6 hours as needed for mild pain or fever

## 2018-09-26 NOTE — PROGRESS NOTES
SUBJECTIVE:   Christiano Leon is a 73 year old male who presents to clinic today for the following health issues:      Hyperlipidemia Follow-Up      Rate your low fat/cholesterol diet?: good    Taking statin?  Yes, no muscle aches from statin    Other lipid medications/supplements?:  none  Will check lipids today  A1C was 5.8 checked last month  Hypothyroidism Follow-up      Since last visit, patient describes the following symptoms: Weight stable, no hair loss, no skin changes, no constipation, no loose stools      Amount of exercise or physical activity: 6-7 days/week for an average of greater than 60 minutes    Problems taking medications regularly: No    Medication side effects: none    Diet: regular (no restrictions)  Last TSH was normal  1.26 on 3-2-18  At the facility has had some elevated blood pressure readings.  The systolic has been from 113 up to 150 and the diastolic is been from .  he denies any headache chest pain shortness of breath.  Just had coffee this morning so we can do fasting lipids.  In September of this year had an A1c of 5.8 and March 2 of this year had a TSH 1.26  [facility]    Christiano Leon, 73 year old, male presents with   Chief Complaint   Patient presents with     Thyroid Problem     Lipids     Flu Shot       PAST MEDICAL HISTORY:  Past Medical History:   Diagnosis Date     BPH (benign prostatic hyperplasia)      Poor dentition        PAST SURGICAL HISTORY:  Past Surgical History:   Procedure Laterality Date     Bilateral cataract       HERNIA REPAIR Right 2007?     Right knee meniscectomy Right        MEDICATIONS:  Prior to Admission medications    Medication Sig Start Date End Date Taking? Authorizing Provider   Acetaminophen (TYLENOL PO) Take 1,000 mg by mouth every 6 hours as needed for mild pain or fever   Yes Reported, Patient   carbamide peroxide (DEBROX) 6.5 % otic solution Place 5 drops Into the left ear 2 times daily Do this once a month for 4 days each  time, for the next 6 months 9/26/18  Yes Carmen Kay APRN CNP   Docusate Sodium (COLACE PO) Take 200 mg by mouth 2 times daily   Yes Reported, Patient   levothyroxine (SYNTHROID/LEVOTHROID) 50 MCG tablet TAKE 1 TABLET DAILY BY MOUTH - GENERIC FOR SYNTHROID 6/18/18  Yes ROBERT Akbar MD   QUEtiapine Fumarate (SEROQUEL PO) Take 900 mg by mouth At Bedtime   Yes Reported, Patient   simvastatin (ZOCOR) 40 MG tablet Take 1 tablet (40 mg) by mouth At Bedtime 6/4/18  Yes Modesta Akbar MD   VITAMIN D3 1000 units tablet TAKE 1 TABLET BY MOUTH ONCE DAILY 5/1/18  Yes ROBERT Akbar MD       ALLERGIES:     Allergies   Allergen Reactions     Nickel      Possible. Cheap metal.     Penicillins      Seasonal Allergies      Perfumes       ROS:  Constitutional, neuro, ENT, endocrine, pulmonary, cardiac, gastrointestinal, genitourinary, musculoskeletal, integument and psychiatric systems are negative, except as otherwise noted.      EXAM:  /82  Pulse 83  Temp 97.6  F (36.4  C) (Tympanic)  Wt 169 lb (76.7 kg)  SpO2 95%  BMI 23.57 kg/m2 Body mass index is 23.57 kg/(m^2).   GENERAL APPEARANCE: healthy, alert and no distress  EYES: Eyes grossly normal to inspection, PERRL and conjunctivae and sclerae normal  HENT:nose and mouth without ulcers or lesions  NECK: no adenopathy, no asymmetry, masses, or scars and thyroid normal to palpation  RESP: lungs clear to auscultation - no rales, rhonchi or wheezes  CV: regular rates and rhythm, normal S1 S2, no S3 or S4 and no murmur, click or rub  ABDOMEN: soft, nontender, without hepatosplenomegaly or masses and bowel sounds normal  NEURO: Normal strength and tone, mentation intact and speech normal  PSYCH: mentation appears normal and affect normal  Lab/ X-ray  No results found for this or any previous visit (from the past 24 hour(s)).  MIGUEL-7 SCORE 4/3/2018 8/9/2018 10/4/2018   Total Score 0 0 0     PHQ-9 SCORE 4/3/2018 8/9/2018 10/4/2018   Total Score 0 0 0        ASSESSMENT/PLAN:    ICD-10-CM    1. Need for prophylactic vaccination and inoculation against influenza Z23 HC FLU VACCINE, INCREASED ANTIGEN, PRESV FREE     Pneumococcal vaccine 13 valent PCV13 IM (Prevnar) [82874]     ADMIN PNEUMOVAX VACCINE (For MEDICARE Patients ONLY) []     ADMIN INFLUENZA (For MEDICARE Patients ONLY) []   2. Acquired hypothyroidism E03.9    3. Hypercholesterolemia E78.00 Lipid Profile     AST     Will get both flu shot and the Pneumovax today.  He is stable with hypothyroidism.  Doing well on replacement.  We will see him in 6 months.  Hypercholesterolemia he is fasting we will check lipid AST is on Zocor.  Recent A1c was just in the prediabetic range.  He has been trying to follow a low fat low sugar diet.    MALVIN Akbar MD  October 4, 2018

## 2018-10-01 ENCOUNTER — HOSPITAL ENCOUNTER (OUTPATIENT)
Dept: PHYSICAL THERAPY | Facility: HOSPITAL | Age: 73
Setting detail: THERAPIES SERIES
End: 2018-10-01
Attending: ORTHOPAEDIC SURGERY
Payer: MEDICARE

## 2018-10-01 PROCEDURE — 40000718 ZZHC STATISTIC PT DEPARTMENT ORTHO VISIT

## 2018-10-01 PROCEDURE — 97110 THERAPEUTIC EXERCISES: CPT | Mod: GP

## 2018-10-02 ENCOUNTER — OFFICE VISIT (OUTPATIENT)
Dept: AUDIOLOGY | Facility: OTHER | Age: 73
End: 2018-10-02
Attending: AUDIOLOGIST
Payer: MEDICARE

## 2018-10-02 DIAGNOSIS — H90.3 SENSORINEURAL HEARING LOSS, BILATERAL: Primary | ICD-10-CM

## 2018-10-02 PROCEDURE — V5275 EAR IMPRESSION: HCPCS | Performed by: AUDIOLOGIST

## 2018-10-02 PROCEDURE — 92591 ZZHC HEARING AID EXAM BINAURAL: CPT | Performed by: AUDIOLOGIST

## 2018-10-02 NOTE — MR AVS SNAPSHOT
After Visit Summary   10/2/2018    Christiano Leon    MRN: [id number]           Patient Information     Date Of Birth          1945        Visit Information        Provider Department      10/2/2018 9:15 AM Angela Nowak AuD [facility]        Today's Diagnoses     Sensorineural hearing loss, bilateral    -  1       Follow-ups after your visit        Your next 10 appointments already scheduled     Oct 04, 2018  8:15 AM CDT   (Arrive by 8:00 AM)   Office Visit with ROBERT Akbar MD   [facility] ([facility] )    [address]  [place] [address]   [phone]           Bring a current list of meds and any records pertaining to this visit.  For Physicals, please bring immunization records and any forms needing to be filled out.  Please arrive 15 minutes early to complete paperwork and register.            Oct 08, 2018 11:00 AM CDT   (Arrive by 10:45 AM)   MR IAC W AND W/O CONTRAST with HIMR1   [facility] ([facility] )    [address] [id number] [phone]            Oct 11, 2018  1:00 PM CDT   Treatment with Ilsa Brizuela, PT   [facility] ([facility] )    [address] [address]   [phone]            Oct 26, 2018 10:15 AM CDT   (Arrive by 10:00 AM)   Hearing Aid Fitting with Jonelle Lawton   [facility] ([facility] )    [address]  [place] [address]   [phone]            Nov 06, 2018  8:15 AM CST   (Arrive by 8:00 AM)   Return Visit with Jonelle Lawton   [facility] ([facility] )    [address]  [facility] [address]   [phone]              Who to contact     If you have questions or need follow up information about today's clinic visit or your schedule please contact [facility] directly at [phone].  Normal or  "non-critical lab and imaging results will be communicated to you by MyChart, letter or phone within 4 business days after the clinic has received the results. If you do not hear from us within 7 days, please contact the clinic through [employer] or phone. If you have a critical or abnormal lab result, we will notify you by phone as soon as possible.  Submit refill requests through [employer] or call your pharmacy and they will forward the refill request to us. Please allow 3 business days for your refill to be completed.          Additional Information About Your Visit        [employer] Information     [employer] lets you send messages to your doctor, view your test results, renew your prescriptions, schedule appointments and more. To sign up, go to [email or web address] . Click on \"Log in\" on the left side of the screen, which will take you to the Welcome page. Then click on \"Sign up Now\" on the right side of the page.     You will be asked to enter the access code listed below, as well as some personal information. Please follow the directions to create your username and password.     Your access code is: [email or web address]  Expires: 12/10/2018 10:30 AM     Your access code will  in 90 days. If you need help or a new code, please call your [place] clinic or [phone].        Care EveryWhere ID     This is your Care EveryWhere ID. This could be used by other organizations to access your [place] medical records  [id number]         Blood Pressure from Last 3 Encounters:   18 105/68   18 120/70   18 100/66    Weight from Last 3 Encounters:   18 163 lb (73.9 kg)   18 163 lb (73.9 kg)   18 164 lb (74.4 kg)              Today, you had the following     No orders found for display       Primary Care Provider Office Phone # Fax #    R Dustin Akbar [phone] [address] [facility] [address]        Equal Access to Services     KATELYN CARDONA AH: Hadii aad ku " karan Pedroza, wacherelleda luqadaha, qaybta kaalmada travis, stoney jackiein hayaaranda garzajennifer tessatanvir laJovonchun norma. So [facility] [phone].    ATENCIÓN: Si snowla dru, tiene a pierce disposición servicios gratuitos de asistencia lingüística. Anabel al [phone].    We comply with applicable federal civil rights laws and [place] laws. We do not discriminate on the basis of race, color, national origin, age, disability, sex, sexual orientation, or gender identity.            Thank you!     Thank you for choosing [facility]  for your care. Our goal is always to provide you with excellent care. Hearing back from our patients is one way we can continue to improve our services. Please take a few minutes to complete the written survey that you may receive in the mail after your visit with us. Thank you!             Your Updated Medication List - Protect others around you: Learn how to safely use, store and throw away your medicines at www.disposemymeds.org.          This list is accurate as of 10/2/18  9:20 AM.  Always use your most recent med list.                   Brand Name Dispense Instructions for use Diagnosis    carbamide peroxide 6.5 % otic solution    DEBROX    30 mL    Place 5 drops Into the left ear 2 times daily Do this once a month for 4 days each time, for the next 6 months    Impacted cerumen of left ear       cholecalciferol 1000 UNIT tablet    vitamin D3    100 tablet    TAKE 1 TABLET BY MOUTH ONCE DAILY    Vitamin D deficiency       COLACE PO      Take 200 mg by mouth 2 times daily        levothyroxine 50 MCG tablet    SYNTHROID/LEVOTHROID    30 tablet    TAKE 1 TABLET DAILY BY MOUTH - GENERIC FOR SYNTHROID    Hypothyroidism, unspecified type       SEROQUEL PO      Take 900 mg by mouth At Bedtime        simvastatin 40 MG tablet    ZOCOR    90 tablet    Take 1 tablet (40 mg) by mouth At Bedtime    Hyperlipidemia LDL goal <100       TYLENOL PO      Take 1,000 mg by mouth every 6 hours as  needed for mild pain or fever

## 2018-10-02 NOTE — PROGRESS NOTES
BACKGROUND:  Christiano was seen today for consult regarding hearing aids. The patient notes difficulty with communication in a variety of listening situations and would like to explore possible benefit obtained via use of amplification.      Patient has an upcoming ENT appt. For  medical clearance for hearing aid use.  Patient wishes to proceed with consultation today due to schedule. Christiano is a binaural hearing aid candidate and will receive a Hearing Aid Recommendation today to [facility]    RESULTS:  Brochure form [place] Department of Health titled 'Legal rights and consumer information about purchasing a hearing instrument ' verbally reviewed and given to patient. Trial Period, cancellation fee, warranties highlighted. Estimated insurance coverage for hearing aids reviewed. Patient risk factors have been provided to the patient in writing prior to the sale of the hearing aid per FDA regulation. The risk factors are also available in the User Instructional Booklet to be presented on the day of the hearing aid fitting.    ABN (Advanced Beneficiary Notice of Non-Coverage) completed and copy given to patient.    Thru use of hearing aids he would like to improve  ability to hear:    In restaurants where there are groups and noise. She would like to hear the television at a lower volume so that he can enjoy this activity with other people.     Christiano also reports trouble hearing in the car, at home, and on the telephone if there is not a volume control.      Discussed hearing aid styles, technology, features, and options at length with Christiano.  Sample devices were shown. Pros/Cons of options reviewed.  Expectations for amplification discussed. .Also discussed the importance of binaural amplification for hearing speech in the presence of background noise and localizing the directions of sounds.  Wireless options were also discussed at length and sample devices were shown.     Hearing Aid Recommendations: Hearing Aid  Recommendation reviewed with patient. Pt chose to proceed with order and Purchase Agreement completed. Copies provided to patient.      Hearing Aids:  Binaural Oticon OPN 2 miniRITE  Color: light brown  Battery Size: 312  Earmold/Tips: micromolds with size 2 85 receivers    Ear Impression(s):  Otoscopy revealed clear ear canals bilaterally and otoblock placed appropriately.      Ear Impressions were taken and no concerns thru otoscopic visualization after removal. Tthe hearing aid(s) and earmold(s) will not be ordered until medically cleared.    RECOMMENDATIONS:  Recommended to return to clinic 2 weeks after order shipped for hearing aid fitting, programming and orientation.    Angela Nowak M.S.,[facility]A  Audiologist #[id number]

## 2018-10-04 ENCOUNTER — OFFICE VISIT (OUTPATIENT)
Dept: FAMILY MEDICINE | Facility: OTHER | Age: 73
End: 2018-10-04
Attending: FAMILY MEDICINE
Payer: MEDICARE

## 2018-10-04 VITALS
WEIGHT: 169 LBS | BODY MASS INDEX: 23.57 KG/M2 | TEMPERATURE: 97.6 F | DIASTOLIC BLOOD PRESSURE: 82 MMHG | OXYGEN SATURATION: 95 % | HEART RATE: 83 BPM | SYSTOLIC BLOOD PRESSURE: 134 MMHG

## 2018-10-04 DIAGNOSIS — Z23 NEED FOR VACCINATION: ICD-10-CM

## 2018-10-04 DIAGNOSIS — E03.9 ACQUIRED HYPOTHYROIDISM: ICD-10-CM

## 2018-10-04 DIAGNOSIS — E78.00 HYPERCHOLESTEROLEMIA: ICD-10-CM

## 2018-10-04 DIAGNOSIS — Z23 NEED FOR PROPHYLACTIC VACCINATION AND INOCULATION AGAINST INFLUENZA: Primary | ICD-10-CM

## 2018-10-04 LAB
AST SERPL W P-5'-P-CCNC: 9 U/L (ref 0–45)
CHOLEST SERPL-MCNC: 114 MG/DL
HDLC SERPL-MCNC: 42 MG/DL
LDLC SERPL CALC-MCNC: 44 MG/DL
NONHDLC SERPL-MCNC: 72 MG/DL
TRIGL SERPL-MCNC: 138 MG/DL

## 2018-10-04 PROCEDURE — 99214 OFFICE O/P EST MOD 30 MIN: CPT | Performed by: FAMILY MEDICINE

## 2018-10-04 PROCEDURE — 90662 IIV NO PRSV INCREASED AG IM: CPT | Performed by: FAMILY MEDICINE

## 2018-10-04 PROCEDURE — G0008 ADMIN INFLUENZA VIRUS VAC: HCPCS | Performed by: FAMILY MEDICINE

## 2018-10-04 PROCEDURE — G0009 ADMIN PNEUMOCOCCAL VACCINE: HCPCS | Performed by: FAMILY MEDICINE

## 2018-10-04 PROCEDURE — 84450 TRANSFERASE (AST) (SGOT): CPT | Mod: ZL | Performed by: FAMILY MEDICINE

## 2018-10-04 PROCEDURE — 90732 PPSV23 VACC 2 YRS+ SUBQ/IM: CPT | Performed by: FAMILY MEDICINE

## 2018-10-04 PROCEDURE — 36415 COLL VENOUS BLD VENIPUNCTURE: CPT | Mod: ZL | Performed by: FAMILY MEDICINE

## 2018-10-04 PROCEDURE — 80061 LIPID PANEL: CPT | Mod: ZL | Performed by: FAMILY MEDICINE

## 2018-10-04 ASSESSMENT — PAIN SCALES - GENERAL: PAINLEVEL: NO PAIN (0)

## 2018-10-04 ASSESSMENT — ANXIETY QUESTIONNAIRES
7. FEELING AFRAID AS IF SOMETHING AWFUL MIGHT HAPPEN: NOT AT ALL
1. FEELING NERVOUS, ANXIOUS, OR ON EDGE: NOT AT ALL
5. BEING SO RESTLESS THAT IT IS HARD TO SIT STILL: NOT AT ALL
3. WORRYING TOO MUCH ABOUT DIFFERENT THINGS: NOT AT ALL
GAD7 TOTAL SCORE: 0
6. BECOMING EASILY ANNOYED OR IRRITABLE: NOT AT ALL
4. TROUBLE RELAXING: NOT AT ALL
2. NOT BEING ABLE TO STOP OR CONTROL WORRYING: NOT AT ALL

## 2018-10-04 NOTE — MR AVS SNAPSHOT
After Visit Summary   10/4/2018    Christiano Leon    MRN: [id number]           Patient Information     Date Of Birth          1945        Visit Information        Provider Department      10/4/2018 8:15 AM ROBERT Akbar MD [facility]        Today's Diagnoses     Need for prophylactic vaccination and inoculation against influenza    -  1    Acquired hypothyroidism        Hypercholesterolemia           Follow-ups after your visit        Follow-up notes from your care team     Return in about 6 months (around 4/4/2019).      Your next 10 appointments already scheduled     Oct 08, 2018 11:00 AM CDT   (Arrive by 10:45 AM)   MR IAC W AND W/O CONTRAST with HIMR1   [facility] MRI ([facility] )    [address] [id number] [phone]            Oct 11, 2018  1:00 PM CDT   Treatment with Lety North PTA   [facility] Physical Therapy ([facility] )    [address] [address]   [phone]            Oct 26, 2018 10:15 AM CDT   (Arrive by 10:00 AM)   Hearing Aid Fitting with Jonelle Lawton   [facility] ([facility] )    [address]  [place] [address]   [phone]            Nov 06, 2018  8:15 AM CST   (Arrive by 8:00 AM)   Return Visit with Jonelle Lawton   [facility] ([facility] )    [address]  [place] [address]   [phone]            Apr 09, 2019  1:00 PM CDT   (Arrive by 12:45 PM)   Office Visit with ROBERT Akbar MD   [facility] ([facility] )    [address]  [place] [address]   [phone]           Bring a current list of meds and any records pertaining to this visit.  For Physicals, please bring immunization records and any forms needing to be filled out.  Please arrive 15 minutes early to complete paperwork and register.              Who to contact     If you have  "questions or need follow up information about today's clinic visit or your schedule please contact [facility] directly at [phone].  Normal or non-critical lab and imaging results will be communicated to you by MyChart, letter or phone within 4 business days after the clinic has received the results. If you do not hear from us within 7 days, please contact the clinic through MyChart or phone. If you have a critical or abnormal lab result, we will notify you by phone as soon as possible.  Submit refill requests through Mempile or call your pharmacy and they will forward the refill request to us. Please allow 3 business days for your refill to be completed.          Additional Information About Your Visit        [employer] Information     Mempile lets you send messages to your doctor, view your test results, renew your prescriptions, schedule appointments and more. To sign up, go to [email or web address] . Click on \"Log in\" on the left side of the screen, which will take you to the Welcome page. Then click on \"Sign up Now\" on the right side of the page.     You will be asked to enter the access code listed below, as well as some personal information. Please follow the directions to create your username and password.     Your access code is: [email or web address]  Expires: 12/10/2018 10:30 AM     Your access code will  in 90 days. If you need help or a new code, please call your [facility] clinic or [phone].        Care EveryWhere ID     This is your Care EveryWhere ID. This could be used by other organizations to access your [facility] medical records  [id number]        Your Vitals Were     Pulse Temperature Pulse Oximetry BMI (Body Mass Index)          83 97.6  F (36.4  C) (Tympanic) 95% 23.57 kg/m2         Blood Pressure from Last 3 Encounters:   10/04/18 134/82   18 105/68   18 120/70    Weight from Last 3 Encounters:   10/04/18 169 lb (76.7 kg)   18 163 lb (73.9 kg) "   09/14/18 163 lb (73.9 kg)              We Performed the Following     ADMIN INFLUENZA (For MEDICARE Patients ONLY) []     ADMIN PNEUMOVAX VACCINE (For MEDICARE Patients ONLY) []     AST     HC FLU VACCINE, INCREASED ANTIGEN, PRESV FREE     Lipid Profile     Pneumococcal vaccine 13 valent PCV13 IM (Prevnar) [78557]        Primary Care Provider Office Phone # Fax #    R Dustin Akbar [phone]       [facility] [address]        Equal Access to Services     ANA [facility] : Hadii aad ku hadasho Soomaali, waaxda luqadaha, qaybta kaalmada adeegyada, waxay idiin hayaan adeeg khtanvir roberson . So [facility] [phone].    ATENCIÓN: Si habla español, tiene a pierce disposición servicios gratuitos de asistencia lingüística. [facility] [phone].    We comply with applicable federal civil rights laws and [place] laws. We do not discriminate on the basis of race, color, national origin, age, disability, sex, sexual orientation, or gender identity.            Thank you!     Thank you for choosing [facility]  for your care. Our goal is always to provide you with excellent care. Hearing back from our patients is one way we can continue to improve our services. Please take a few minutes to complete the written survey that you may receive in the mail after your visit with us. Thank you!             Your Updated Medication List - Protect others around you: Learn how to safely use, store and throw away your medicines at www.disposemymeds.org.          This list is accurate as of 10/4/18  8:23 AM.  Always use your most recent med list.                   Brand Name Dispense Instructions for use Diagnosis    carbamide peroxide 6.5 % otic solution    DEBROX    30 mL    Place 5 drops Into the left ear 2 times daily Do this once a month for 4 days each time, for the next 6 months    Impacted cerumen of left ear       cholecalciferol 1000 UNIT tablet    vitamin D3    100 tablet     TAKE 1 TABLET BY MOUTH ONCE DAILY    Vitamin D deficiency       COLACE PO      Take 200 mg by mouth 2 times daily        levothyroxine 50 MCG tablet    SYNTHROID/LEVOTHROID    30 tablet    TAKE 1 TABLET DAILY BY MOUTH - GENERIC FOR SYNTHROID    Hypothyroidism, unspecified type       SEROQUEL PO      Take 900 mg by mouth At Bedtime        simvastatin 40 MG tablet    ZOCOR    90 tablet    Take 1 tablet (40 mg) by mouth At Bedtime    Hyperlipidemia LDL goal <100       TYLENOL PO      Take 1,000 mg by mouth every 6 hours as needed for mild pain or fever

## 2018-10-04 NOTE — NURSING NOTE
"Chief Complaint   Patient presents with     Thyroid Problem     Lipids       Initial /82  Pulse 83  Temp 97.6  F (36.4  C) (Tympanic)  Wt 169 lb (76.7 kg)  SpO2 95%  BMI 23.57 kg/m2 Estimated body mass index is 23.57 kg/(m^2) as calculated from the following:    Height as of 9/14/18: 5' 11\" (1.803 m).    Weight as of this encounter: 169 lb (76.7 kg).  Medication Reconciliation: complete    Wendy Mazariegos MA    "

## 2018-10-04 NOTE — PROGRESS NOTES

## 2018-10-05 ASSESSMENT — PATIENT HEALTH QUESTIONNAIRE - PHQ9: SUM OF ALL RESPONSES TO PHQ QUESTIONS 1-9: 0

## 2018-10-05 ASSESSMENT — ANXIETY QUESTIONNAIRES: GAD7 TOTAL SCORE: 0

## 2018-10-08 ENCOUNTER — TELEPHONE (OUTPATIENT)
Dept: MRI IMAGING | Facility: HOSPITAL | Age: 73
End: 2018-10-08

## 2018-10-08 NOTE — TELEPHONE ENCOUNTER
I will sign of on medical clearance for hearing aides. Recommend audiogram in 6 months for evaluation of ASNHL. If stable, can then proceed with annual audiograms.

## 2018-10-11 ENCOUNTER — HOSPITAL ENCOUNTER (OUTPATIENT)
Dept: PHYSICAL THERAPY | Facility: HOSPITAL | Age: 73
Setting detail: THERAPIES SERIES
End: 2018-10-11
Attending: ORTHOPAEDIC SURGERY
Payer: MEDICARE

## 2018-10-11 PROCEDURE — 97110 THERAPEUTIC EXERCISES: CPT | Mod: GP

## 2018-10-11 PROCEDURE — 40000718 ZZHC STATISTIC PT DEPARTMENT ORTHO VISIT

## 2018-10-18 DIAGNOSIS — K59.00 CONSTIPATION, UNSPECIFIED CONSTIPATION TYPE: ICD-10-CM

## 2018-10-18 RX ORDER — DOCUSATE SODIUM 100 MG/1
CAPSULE, LIQUID FILLED ORAL
Qty: 100 CAPSULE | Refills: 3 | Status: SHIPPED | OUTPATIENT
Start: 2018-10-18 | End: 2019-01-31

## 2018-10-18 NOTE — TELEPHONE ENCOUNTER
DOK      Last Written Prescription Date:  ?  Last Fill Quantity: ?,   # refills: ?  Last Office Visit: 10/4/18  Future Office visit:    Next 5 appointments (look out 90 days)     Nov 06, 2018  8:15 AM CST   (Arrive by 8:00 AM)   Return Visit with Jonelle Lawton   [facility] ([facility] )    [address] [place] [address]   [phone]

## 2018-10-26 ENCOUNTER — OFFICE VISIT (OUTPATIENT)
Dept: AUDIOLOGY | Facility: OTHER | Age: 73
End: 2018-10-26
Attending: AUDIOLOGIST
Payer: MEDICARE

## 2018-10-26 DIAGNOSIS — H90.3 SENSORINEURAL HEARING LOSS, BILATERAL: Primary | ICD-10-CM

## 2018-10-26 PROCEDURE — V5261 HEARING AID, DIGIT, BIN, BTE: HCPCS | Mod: NU | Performed by: AUDIOLOGIST

## 2018-10-26 PROCEDURE — V5160 DISPENSING FEE BINAURAL: HCPCS | Performed by: AUDIOLOGIST

## 2018-10-26 PROCEDURE — V5264 EAR MOLD/INSERT: HCPCS | Mod: LT | Performed by: AUDIOLOGIST

## 2018-10-26 NOTE — MR AVS SNAPSHOT
After Visit Summary   10/26/2018    Christiano Leon    MRN: [id number]           Patient Information     Date Of Birth          1945        Visit Information        Provider Department      10/26/2018 10:15 AM Angela Nowak AuD [facility]        Today's Diagnoses     Sensorineural hearing loss, bilateral    -  1       Follow-ups after your visit        Your next 10 appointments already scheduled     Nov 06, 2018  8:15 AM CST   (Arrive by 8:00 AM)   Return Visit with Jonelle Lawton   [facility] [place] [facility]    [address]  [place] [address]   [phone]            Apr 09, 2019  1:00 PM CDT   (Arrive by 12:45 PM)   Office Visit with ROBERT Akbar MD   [facility]    [address]  [place] [address]   [phone]           Bring a current list of meds and any records pertaining to this visit.  For Physicals, please bring immunization records and any forms needing to be filled out.  Please arrive 15 minutes early to complete paperwork and register.              Who to contact     If you have questions or need follow up information about today's clinic visit or your schedule please contact [facility] directly at [phone].  Normal or non-critical lab and imaging results will be communicated to you by MyChart, letter or phone within 4 business days after the clinic has received the results. If you do not hear from us within 7 days, please contact the clinic through MyChart or phone. If you have a critical or abnormal lab result, we will notify you by phone as soon as possible.  Submit refill requests through brettapproved or call your pharmacy and they will forward the refill request to us. Please allow 3 business days for your refill to be completed.          Additional Information About Your Visit        Care EveryWhere ID     This is your  Care EveryWhere ID. This could be used by other organizations to access your [facility] medical records  [id number]         Blood Pressure from Last 3 Encounters:   10/04/18 134/82   09/26/18 105/68   09/14/18 120/70    Weight from Last 3 Encounters:   10/04/18 76.7 kg (169 lb)   09/26/18 73.9 kg (163 lb)   09/14/18 73.9 kg (163 lb)              Today, you had the following     No orders found for display       Primary Care Provider Office Phone # Fax #    R Dustin Akbar [phone]       [facility] [place] [address]        Equal Access to Services     KATELYN CARDONA : Hadii kellee louieo Soolivia, waaxda luqadaha, qaybta kaalmada adeegyada, stoney roberson . So [facility] [phone].    ATENCIÓN: Si habla español, tiene a pierce disposición servicios gratuitos de asistencia lingüística. Llame al [phone].    We comply with applicable federal civil rights laws and [place] laws. We do not discriminate on the basis of race, color, national origin, age, disability, sex, sexual orientation, or gender identity.            Thank you!     Thank you for choosing [facility]  for your care. Our goal is always to provide you with excellent care. Hearing back from our patients is one way we can continue to improve our services. Please take a few minutes to complete the written survey that you may receive in the mail after your visit with us. Thank you!             Your Updated Medication List - Protect others around you: Learn how to safely use, store and throw away your medicines at www.disposemymeds.org.          This list is accurate as of 10/26/18 12:04 PM.  Always use your most recent med list.                   Brand Name Dispense Instructions for use Diagnosis    carbamide peroxide 6.5 % otic solution    DEBROX    30 mL    Place 5 drops Into the left ear 2 times daily Do this once a month for 4 days each time, for the next 6 months    Impacted  cerumen of left ear       cholecalciferol 1000 UNIT tablet    vitamin D3    100 tablet    TAKE 1 TABLET BY MOUTH ONCE DAILY    Vitamin D deficiency       * COLACE PO      Take 200 mg by mouth 2 times daily        *  MG capsule   Generic drug:  docusate sodium     100 capsule    TAKE 2 CAPSULES BY MOUTH TWICE A DAY    Constipation, unspecified constipation type       levothyroxine 50 MCG tablet    SYNTHROID/LEVOTHROID    30 tablet    TAKE 1 TABLET DAILY BY MOUTH - GENERIC FOR SYNTHROID    Hypothyroidism, unspecified type       SEROQUEL PO      Take 900 mg by mouth At Bedtime        simvastatin 40 MG tablet    ZOCOR    90 tablet    Take 1 tablet (40 mg) by mouth At Bedtime    Hyperlipidemia LDL goal <100       TYLENOL PO      Take 1,000 mg by mouth every 6 hours as needed for mild pain or fever        * Notice:  This list has 2 medication(s) that are the same as other medications prescribed for you. Read the directions carefully, and ask your doctor or other care provider to review them with you.

## 2018-10-26 NOTE — PROGRESS NOTES
AUDIOLOGY REPORT    SUBJECTIVE: Christiano Leon, a 73 year old male, was seen in the Audiology Clinic at [facility] [place] [facility] today for a Binaural hearing aid fitting. Previous results have revealed a bilateral  sensorineural hearing loss. The patient was given medical clearance to pursue amplification by  Carmen Kay CNP.    OBJECTIVE:  Prior to fitting, a hearing aid check was performed to ensure device functionality. The hearing aid conformity evaluation was completed.The hearing aids were placed and they provided a good fit. Real-ear-probe-microphone measurements were completed on the [employer] system and were a good match to NAL-NL2 target with soft sounds audible, moderate sounds comfortable, and loud sounds below discomfort. UCLs are verified through maximum power output measures and demonstrate appropriate limiting of loud inputs. Mr. Leon was oriented to proper hearing aid use, care, cleaning (no water, dry brush), batteries (size 312-32 cells given, insertion/removal, toxicity, low-battery signal), aid insertion/removal, user booklet, warranty information, storage cases, and other hearing aid details. The patient confirmed understanding of hearing aid use and care, and showed proper insertion of hearing aid and batteries while in the office today. Mr. Leon reported good volume and sound quality today.    EAR(S) FIT: Binaural  MA HEARING AID MAKE: Right: Oticon OPN 2; Left: Oticon OPN 2  MA HEARING AID MODEL #: Right: [id number]; Left: [id number]  HEARING AID STYLE: Right: mini-RITE-BTE; Left: mini-RITE-BTE  SERIAL NUMBERS: Right: [id number]; Left: [id number]  WARRANTY END DATE: Right: 11/11/2021; Left:: 11/11/2021     LENGTH: Right:  Speaker 85 Wire 2; Left:  Speaker 85 Wire 2  EARMOLDS: Right: custom micromolds; Left:  custom micromolds  90 day remake/repair warranty ends: 1/26/2019         ASSESSMENT: Hearing aid fitting completed today. Verification measures were performed.   PLAN:  Mr. Leon will return for follow-up in 2-3 weeks for a hearing aid review appointment. Please call this clinic with questions regarding today s appointment.    Angela Nowak M.S., [facility]-A  Audiologist #[id number]
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
## 2018-11-08 ENCOUNTER — OFFICE VISIT (OUTPATIENT)
Dept: FAMILY MEDICINE | Facility: OTHER | Age: 73
End: 2018-11-08
Attending: FAMILY MEDICINE
Payer: MEDICARE

## 2018-11-08 VITALS
DIASTOLIC BLOOD PRESSURE: 76 MMHG | HEART RATE: 85 BPM | WEIGHT: 168 LBS | BODY MASS INDEX: 23.43 KG/M2 | SYSTOLIC BLOOD PRESSURE: 124 MMHG | TEMPERATURE: 97.3 F | OXYGEN SATURATION: 96 %

## 2018-11-08 DIAGNOSIS — G89.29 CHRONIC RIGHT SHOULDER PAIN: Primary | ICD-10-CM

## 2018-11-08 DIAGNOSIS — M25.511 CHRONIC RIGHT SHOULDER PAIN: Primary | ICD-10-CM

## 2018-11-08 PROCEDURE — G0463 HOSPITAL OUTPT CLINIC VISIT: HCPCS

## 2018-11-08 PROCEDURE — 99213 OFFICE O/P EST LOW 20 MIN: CPT | Performed by: FAMILY MEDICINE

## 2018-11-08 ASSESSMENT — ANXIETY QUESTIONNAIRES
5. BEING SO RESTLESS THAT IT IS HARD TO SIT STILL: NOT AT ALL
GAD7 TOTAL SCORE: 0
4. TROUBLE RELAXING: NOT AT ALL
3. WORRYING TOO MUCH ABOUT DIFFERENT THINGS: NOT AT ALL
6. BECOMING EASILY ANNOYED OR IRRITABLE: NOT AT ALL
2. NOT BEING ABLE TO STOP OR CONTROL WORRYING: NOT AT ALL
7. FEELING AFRAID AS IF SOMETHING AWFUL MIGHT HAPPEN: NOT AT ALL
1. FEELING NERVOUS, ANXIOUS, OR ON EDGE: NOT AT ALL

## 2018-11-08 ASSESSMENT — PATIENT HEALTH QUESTIONNAIRE - PHQ9: SUM OF ALL RESPONSES TO PHQ QUESTIONS 1-9: 0

## 2018-11-08 ASSESSMENT — PAIN SCALES - GENERAL: PAINLEVEL: MODERATE PAIN (5)

## 2018-11-08 NOTE — NURSING NOTE
"Chief Complaint   Patient presents with     Musculoskeletal Problem       Initial /76  Pulse 85  Temp 97.3  F (36.3  C) (Tympanic)  Wt 168 lb (76.2 kg)  SpO2 96%  BMI 23.43 kg/m2 Estimated body mass index is 23.43 kg/(m^2) as calculated from the following:    Height as of 9/14/18: 5' 11\" (1.803 m).    Weight as of this encounter: 168 lb (76.2 kg).  Medication Reconciliation: complete    Wendy Mazariegos MA    "

## 2018-11-09 ASSESSMENT — ANXIETY QUESTIONNAIRES: GAD7 TOTAL SCORE: 0

## 2018-11-28 ENCOUNTER — HOSPITAL ENCOUNTER (OUTPATIENT)
Dept: PHYSICAL THERAPY | Facility: HOSPITAL | Age: 73
Setting detail: THERAPIES SERIES
End: 2018-11-28
Attending: FAMILY MEDICINE
Payer: MEDICARE

## 2018-11-28 PROCEDURE — 40000718 ZZHC STATISTIC PT DEPARTMENT ORTHO VISIT

## 2018-11-28 PROCEDURE — 97110 THERAPEUTIC EXERCISES: CPT | Mod: GP

## 2018-11-30 ENCOUNTER — HOSPITAL ENCOUNTER (OUTPATIENT)
Dept: PHYSICAL THERAPY | Facility: HOSPITAL | Age: 73
Setting detail: THERAPIES SERIES
End: 2018-11-30
Attending: FAMILY MEDICINE
Payer: MEDICARE

## 2018-11-30 PROCEDURE — 97110 THERAPEUTIC EXERCISES: CPT | Mod: GP

## 2018-12-03 ENCOUNTER — HOSPITAL ENCOUNTER (OUTPATIENT)
Dept: PHYSICAL THERAPY | Facility: HOSPITAL | Age: 73
Setting detail: THERAPIES SERIES
End: 2018-12-03
Attending: FAMILY MEDICINE
Payer: MEDICARE

## 2018-12-03 PROCEDURE — 97110 THERAPEUTIC EXERCISES: CPT | Mod: GP

## 2018-12-12 ENCOUNTER — HOSPITAL ENCOUNTER (OUTPATIENT)
Dept: PHYSICAL THERAPY | Facility: HOSPITAL | Age: 73
Setting detail: THERAPIES SERIES
End: 2018-12-12
Attending: FAMILY MEDICINE
Payer: MEDICARE

## 2018-12-12 PROCEDURE — 97110 THERAPEUTIC EXERCISES: CPT | Mod: GP

## 2018-12-19 ENCOUNTER — HOSPITAL ENCOUNTER (OUTPATIENT)
Dept: PHYSICAL THERAPY | Facility: HOSPITAL | Age: 73
Setting detail: THERAPIES SERIES
End: 2018-12-19
Attending: FAMILY MEDICINE
Payer: MEDICARE

## 2018-12-19 PROCEDURE — 97110 THERAPEUTIC EXERCISES: CPT | Mod: GP

## 2019-01-03 DIAGNOSIS — F03.91 DEMENTIA WITH BEHAVIORAL DISTURBANCE, UNSPECIFIED DEMENTIA TYPE: Primary | ICD-10-CM

## 2019-01-03 NOTE — TELEPHONE ENCOUNTER
Monserrat from [facility] called to request a months worth of Seroquel for patient.  He was recently at a different facility and it was prescribed by a different provider so she only needs a months worth sent to [facility] in [place].  Please advise et let Monserrat know.  Thank you.    Seroquel      Last Written Prescription Date:  Historical  Last Fill Quantity: n/a,   # refills: n/a  Last Office Visit: 11/8/18

## 2019-01-07 RX ORDER — QUETIAPINE FUMARATE 300 MG/1
900 TABLET, FILM COATED ORAL AT BEDTIME
Qty: 270 TABLET | Refills: 3 | Status: SHIPPED | OUTPATIENT
Start: 2019-01-07 | End: 2019-09-16

## 2019-01-17 NOTE — PROGRESS NOTES
SUBJECTIVE:   Christiano Leon is a 74 year old male who presents to clinic today for the following health issues:        ENT Symptoms             Symptoms: cc Present Absent Comment   Fever/Chills   X    Fatigue   X    Muscle Aches       Eye Irritation   X    Sneezing   X    Nasal Mike/Drg  X     Sinus Pressure/Pain   X    Loss of smell   X    Dental pain   X    Sore Throat  X     Swollen Glands  X     Ear Pain/Fullness   X    Cough  X  little dry cough   Wheeze   X    Chest Pain   X    Shortness of breath   X    Rash       Other         Symptom duration:  6 months- on and off    Symptom severity:  moderate    Treatments tried:  tylenol for sore throat    Contacts:  none but does live in an Assisted Living           Also wants another referral to PT for his right shoulder. He feels one to two more sessions and the shoulder should be back to baseline.  [facility]    Christiano Leon, 74 year old, male presents with   Chief Complaint   Patient presents with     URI       PAST MEDICAL HISTORY:  Past Medical History:   Diagnosis Date     BPH (benign prostatic hyperplasia)      Poor dentition        PAST SURGICAL HISTORY:  Past Surgical History:   Procedure Laterality Date     Bilateral cataract       HERNIA REPAIR Right 2007?     Right knee meniscectomy Right        MEDICATIONS:  Prior to Admission medications    Medication Sig Start Date End Date Taking? Authorizing Provider   Docusate Sodium (COLACE PO) Take 200 mg by mouth 2 times daily   Yes Reported, Patient    MG capsule TAKE 2 CAPSULES BY MOUTH TWICE A DAY 10/18/18  Yes ROBERT Akbar MD   levothyroxine (SYNTHROID/LEVOTHROID) 50 MCG tablet TAKE 1 TABLET DAILY BY MOUTH - GENERIC FOR SYNTHROID 6/18/18  Yes ROBERT Akbar MD   QUEtiapine (SEROQUEL) 300 MG tablet Take 3 tablets (900 mg) by mouth At Bedtime 1/7/19 1/7/20 Yes Don Sen MD   simvastatin (ZOCOR) 40 MG tablet Take 1 tablet (40 mg) by mouth At Bedtime 6/4/18  Yes Modesta Akbar  "MD   VITAMIN D3 1000 units tablet TAKE 1 TABLET BY MOUTH ONCE DAILY 5/1/18  Yes ROBERT Akbar MD   Acetaminophen (TYLENOL PO) Take 1,000 mg by mouth every 6 hours as needed for mild pain or fever    Reported, Patient   carbamide peroxide (DEBROX) 6.5 % otic solution Place 5 drops Into the left ear 2 times daily Do this once a month for 4 days each time, for the next 6 months  Patient not taking: Reported on 1/22/2019 9/26/18   Carmen Kay APRN CNP       ALLERGIES:     Allergies   Allergen Reactions     Nickel      Possible. Cheap metal.     Penicillins      Seasonal Allergies      Perfumes       ROS:  Constitutional, HEENT, cardiovascular, pulmonary, gi and gu systems are negative, except as otherwise noted.      EXAM:  /72 (BP Location: Left arm, Patient Position: Chair, Cuff Size: Adult Regular)   Pulse 66   Temp 97  F (36.1  C)   Ht 1.803 m (5' 11\")   Wt 73.9 kg (163 lb)   SpO2 95%   BMI 22.73 kg/m   Body mass index is 22.73 kg/m .   GENERAL APPEARANCE: healthy, alert and no distress  EYES: Eyes grossly normal to inspection, PERRL and conjunctivae and sclerae normal  HENT: ear canals and TM's normal and nose and mouth without ulcers or lesions, pharynx is slightly red  NECK: no adenopathy, no asymmetry, masses, or scars and thyroid normal to palpation  RESP: lungs clear to auscultation - no rales, rhonchi or wheezes  CV: regular rates and rhythm, normal S1 S2, no S3 or S4 and no murmur, click or rub  MS: tenderness right anterior shoulder.  Also has tenderness right knee no effusion  Lab/ X-ray  Results for orders placed or performed in visit on 01/22/19   CBC with platelets differential   Result Value Ref Range    WBC 4.9 4.0 - 11.0 10e9/L    RBC Count 4.65 4.4 - 5.9 10e12/L    Hemoglobin 14.0 13.3 - 17.7 g/dL    Hematocrit 42.5 40.0 - 53.0 %    MCV 91 78 - 100 fl    MCH 30.1 26.5 - 33.0 pg    MCHC 32.9 31.5 - 36.5 g/dL    RDW 13.4 10.0 - 15.0 %    Platelet Count 167 150 - 450 10e9/L    Diff " Method Automated Method     % Neutrophils 50.8 %    % Lymphocytes 33.8 %    % Monocytes 11.1 %    % Eosinophils 3.5 %    % Basophils 0.6 %    % Immature Granulocytes 0.2 %    Nucleated RBCs 0 0 /100    Absolute Neutrophil 2.5 1.6 - 8.3 10e9/L    Absolute Lymphocytes 1.7 0.8 - 5.3 10e9/L    Absolute Monocytes 0.5 0.0 - 1.3 10e9/L    Absolute Eosinophils 0.2 0.0 - 0.7 10e9/L    Absolute Basophils 0.0 0.0 - 0.2 10e9/L    Abs Immature Granulocytes 0.0 0 - 0.4 10e9/L    Absolute Nucleated RBC 0.0      Chest x-ray no acute change    ASSESSMENT/PLAN:    ICD-10-CM    1. Cough R05 CBC with platelets differential     XR Chest 2 Views   2. Chronic right shoulder pain M25.511 PHYSICAL THERAPY REFERRAL    G89.29    3. Acute bronchitis, unspecified organism J20.9 azithromycin (ZITHROMAX) 250 MG tablet   4. Chronic pain of right knee M25.561 order for DME    G89.29    5. Acute pharyngitis, unspecified etiology J02.9      Has a bronchitis treat with a Z-Messi.  Also has pharyngitis he can gargle salt water use Tylenol.  For the chronic right knee pain can use a knee sleeve he will get that at [facility].  For the shoulder pain he did undergo physical therapy but he somehow did not complete the whole session he feels is almost back at baseline would like a couple more sessions.  Referral made.  Follow-up as needed.    MALVIN Akbar MD  January 22, 2019      '

## 2019-01-22 ENCOUNTER — OFFICE VISIT (OUTPATIENT)
Dept: FAMILY MEDICINE | Facility: OTHER | Age: 74
End: 2019-01-22
Attending: FAMILY MEDICINE
Payer: MEDICARE

## 2019-01-22 ENCOUNTER — ANCILLARY PROCEDURE (OUTPATIENT)
Dept: GENERAL RADIOLOGY | Facility: OTHER | Age: 74
End: 2019-01-22
Attending: FAMILY MEDICINE
Payer: MEDICARE

## 2019-01-22 VITALS
BODY MASS INDEX: 22.82 KG/M2 | OXYGEN SATURATION: 95 % | DIASTOLIC BLOOD PRESSURE: 72 MMHG | HEIGHT: 71 IN | SYSTOLIC BLOOD PRESSURE: 115 MMHG | WEIGHT: 163 LBS | TEMPERATURE: 97 F | HEART RATE: 66 BPM

## 2019-01-22 DIAGNOSIS — G89.29 CHRONIC RIGHT SHOULDER PAIN: ICD-10-CM

## 2019-01-22 DIAGNOSIS — R05.9 COUGH: Primary | ICD-10-CM

## 2019-01-22 DIAGNOSIS — M25.561 CHRONIC PAIN OF RIGHT KNEE: ICD-10-CM

## 2019-01-22 DIAGNOSIS — J20.9 ACUTE BRONCHITIS, UNSPECIFIED ORGANISM: ICD-10-CM

## 2019-01-22 DIAGNOSIS — R05.9 COUGH: ICD-10-CM

## 2019-01-22 DIAGNOSIS — J02.9 ACUTE PHARYNGITIS, UNSPECIFIED ETIOLOGY: ICD-10-CM

## 2019-01-22 DIAGNOSIS — G89.29 CHRONIC PAIN OF RIGHT KNEE: ICD-10-CM

## 2019-01-22 DIAGNOSIS — M25.511 CHRONIC RIGHT SHOULDER PAIN: ICD-10-CM

## 2019-01-22 LAB
BASOPHILS # BLD AUTO: 0 10E9/L (ref 0–0.2)
BASOPHILS NFR BLD AUTO: 0.6 %
DIFFERENTIAL METHOD BLD: NORMAL
EOSINOPHIL # BLD AUTO: 0.2 10E9/L (ref 0–0.7)
EOSINOPHIL NFR BLD AUTO: 3.5 %
ERYTHROCYTE [DISTWIDTH] IN BLOOD BY AUTOMATED COUNT: 13.4 % (ref 10–15)
HCT VFR BLD AUTO: 42.5 % (ref 40–53)
HGB BLD-MCNC: 14 G/DL (ref 13.3–17.7)
IMM GRANULOCYTES # BLD: 0 10E9/L (ref 0–0.4)
IMM GRANULOCYTES NFR BLD: 0.2 %
LYMPHOCYTES # BLD AUTO: 1.7 10E9/L (ref 0.8–5.3)
LYMPHOCYTES NFR BLD AUTO: 33.8 %
MCH RBC QN AUTO: 30.1 PG (ref 26.5–33)
MCHC RBC AUTO-ENTMCNC: 32.9 G/DL (ref 31.5–36.5)
MCV RBC AUTO: 91 FL (ref 78–100)
MONOCYTES # BLD AUTO: 0.5 10E9/L (ref 0–1.3)
MONOCYTES NFR BLD AUTO: 11.1 %
NEUTROPHILS # BLD AUTO: 2.5 10E9/L (ref 1.6–8.3)
NEUTROPHILS NFR BLD AUTO: 50.8 %
NRBC # BLD AUTO: 0 10*3/UL
NRBC BLD AUTO-RTO: 0 /100
PLATELET # BLD AUTO: 167 10E9/L (ref 150–450)
RBC # BLD AUTO: 4.65 10E12/L (ref 4.4–5.9)
WBC # BLD AUTO: 4.9 10E9/L (ref 4–11)

## 2019-01-22 PROCEDURE — G0463 HOSPITAL OUTPT CLINIC VISIT: HCPCS | Mod: 25

## 2019-01-22 PROCEDURE — 99214 OFFICE O/P EST MOD 30 MIN: CPT | Performed by: FAMILY MEDICINE

## 2019-01-22 PROCEDURE — G0463 HOSPITAL OUTPT CLINIC VISIT: HCPCS

## 2019-01-22 PROCEDURE — 71046 X-RAY EXAM CHEST 2 VIEWS: CPT | Mod: TC

## 2019-01-22 PROCEDURE — 85025 COMPLETE CBC W/AUTO DIFF WBC: CPT | Mod: ZL | Performed by: FAMILY MEDICINE

## 2019-01-22 PROCEDURE — 36415 COLL VENOUS BLD VENIPUNCTURE: CPT | Mod: ZL | Performed by: FAMILY MEDICINE

## 2019-01-22 RX ORDER — AZITHROMYCIN 250 MG/1
TABLET, FILM COATED ORAL
Qty: 6 TABLET | Refills: 0 | Status: SHIPPED | OUTPATIENT
Start: 2019-01-22 | End: 2019-04-05

## 2019-01-22 ASSESSMENT — PAIN SCALES - GENERAL: PAINLEVEL: MODERATE PAIN (5)

## 2019-01-22 ASSESSMENT — MIFFLIN-ST. JEOR: SCORE: 1491.49

## 2019-01-31 DIAGNOSIS — K59.00 CONSTIPATION, UNSPECIFIED CONSTIPATION TYPE: ICD-10-CM

## 2019-02-01 RX ORDER — DOCUSATE SODIUM 100 MG/1
CAPSULE, LIQUID FILLED ORAL
Qty: 120 CAPSULE | Refills: 1 | Status: SHIPPED | OUTPATIENT
Start: 2019-02-01 | End: 2019-04-04

## 2019-02-01 NOTE — TELEPHONE ENCOUNTER
MG capsule     Last Written Prescription Date:  10/18/18  Last Fill Quantity: 100,   # refills: 3  Last Office Visit: 1/22/19  Future Office visit:    Next 5 appointments (look out 90 days)    Apr 09, 2019  1:00 PM CDT  (Arrive by 12:45 PM)  Office Visit with ROBERT Akbar MD  [facility] ([facility] ) [address]  [place] [address]  [phone]

## 2019-02-06 ENCOUNTER — HOSPITAL ENCOUNTER (OUTPATIENT)
Dept: PHYSICAL THERAPY | Facility: HOSPITAL | Age: 74
Setting detail: THERAPIES SERIES
End: 2019-02-06
Attending: FAMILY MEDICINE
Payer: MEDICARE

## 2019-02-06 DIAGNOSIS — M25.511 CHRONIC RIGHT SHOULDER PAIN: ICD-10-CM

## 2019-02-06 DIAGNOSIS — G89.29 CHRONIC RIGHT SHOULDER PAIN: ICD-10-CM

## 2019-02-06 PROCEDURE — 97110 THERAPEUTIC EXERCISES: CPT | Mod: GP | Performed by: PHYSICAL THERAPIST

## 2019-02-06 NOTE — PROGRESS NOTES
Outpatient Physical Therapy Progress Note     Patient: Christiano Leon  : 1945    Beginning/End Dates of Reporting Period:  2018 to 2019    Referring Provider: Dr EDD Akbar    Therapy Diagnosis: R shoulder pain     Client Self Report: Patient returns to PT with a new referral to continue his progress with his R shoulder. He feels he is 60% back to normal. He still has some tightness and pain in the lateral and posterior aspects of his R shoulder. He wants to continue PT to improve his strength and pain    Objective Measurements:  Objective Measure: Shoulder ROM, strength, exercise progression  Details: Shoulder FLEX 130, , ER 45, IR L3. Strength FLEX 4/5, ABD 4-/5, ER 4/5, IR 5/5.  Exercises progressed to include ER with OTB, FLEX doorway stretch, scaption without weight with goal of 15-20 reps. Patient still requires cueing for proper form       Goals:  Goal Identifier STG 1   Goal Description Pt will demonstrate knowledge/understanding of HEP and report daily compliance   Target Date 19   Date Met      Progress:     Goal Identifier LTG 1   Goal Description Pt will use RUE during daily activities without being limited by pain including overhead motions   Target Date 19   Date Met      Progress:     Goal Identifier LTG 2   Goal Description Pt will use RUE during daily activities without being limited by impaired ROM and /or weakness   Target Date 19   Date Met      Progress:     Goal Identifier LTG 3   Goal Description Patient will demonstrate R shoulder FLEX and ABD to 140 or greater in order to perform all home tasks   Target Date 19   Date Met      Progress:         Progress Toward Goals:   Progress this reporting period: Patient is 60% improved overall and is expected to make more progress towards goals with additional skilled PT      Plan:  Changes to therapy plan of care: 2x/week for 4 weeks    Discharge:  No    I certify the need for these services furnished under  this plan of treatment and while under my care. (Physician co-signature of this document indicates review and certification of the therapy plan).

## 2019-02-08 ENCOUNTER — HOSPITAL ENCOUNTER (OUTPATIENT)
Dept: PHYSICAL THERAPY | Facility: HOSPITAL | Age: 74
Setting detail: THERAPIES SERIES
End: 2019-02-08
Attending: FAMILY MEDICINE
Payer: MEDICARE

## 2019-02-08 PROCEDURE — 97110 THERAPEUTIC EXERCISES: CPT | Mod: GP

## 2019-02-11 ENCOUNTER — HOSPITAL ENCOUNTER (OUTPATIENT)
Dept: PHYSICAL THERAPY | Facility: HOSPITAL | Age: 74
Setting detail: THERAPIES SERIES
End: 2019-02-11
Attending: FAMILY MEDICINE
Payer: MEDICARE

## 2019-02-11 PROCEDURE — 97110 THERAPEUTIC EXERCISES: CPT | Mod: GP | Performed by: PHYSICAL THERAPIST

## 2019-02-15 ENCOUNTER — HOSPITAL ENCOUNTER (OUTPATIENT)
Dept: PHYSICAL THERAPY | Facility: HOSPITAL | Age: 74
Setting detail: THERAPIES SERIES
End: 2019-02-15
Attending: FAMILY MEDICINE
Payer: MEDICARE

## 2019-02-15 PROCEDURE — 97110 THERAPEUTIC EXERCISES: CPT | Mod: GP

## 2019-02-18 ENCOUNTER — HOSPITAL ENCOUNTER (OUTPATIENT)
Dept: PHYSICAL THERAPY | Facility: HOSPITAL | Age: 74
Setting detail: THERAPIES SERIES
End: 2019-02-18
Attending: FAMILY MEDICINE
Payer: MEDICARE

## 2019-02-18 PROCEDURE — 97110 THERAPEUTIC EXERCISES: CPT | Mod: GP

## 2019-02-20 ENCOUNTER — HOSPITAL ENCOUNTER (OUTPATIENT)
Dept: PHYSICAL THERAPY | Facility: HOSPITAL | Age: 74
Setting detail: THERAPIES SERIES
End: 2019-02-20
Attending: FAMILY MEDICINE
Payer: MEDICARE

## 2019-02-20 PROCEDURE — 97110 THERAPEUTIC EXERCISES: CPT | Mod: GP | Performed by: PHYSICAL THERAPIST

## 2019-02-25 ENCOUNTER — HOSPITAL ENCOUNTER (OUTPATIENT)
Dept: PHYSICAL THERAPY | Facility: HOSPITAL | Age: 74
Setting detail: THERAPIES SERIES
End: 2019-02-25
Attending: FAMILY MEDICINE
Payer: MEDICARE

## 2019-02-25 PROCEDURE — 97110 THERAPEUTIC EXERCISES: CPT | Mod: GP

## 2019-02-27 ENCOUNTER — HOSPITAL ENCOUNTER (OUTPATIENT)
Dept: PHYSICAL THERAPY | Facility: HOSPITAL | Age: 74
Setting detail: THERAPIES SERIES
End: 2019-02-27
Attending: FAMILY MEDICINE
Payer: MEDICARE

## 2019-02-27 PROCEDURE — 97110 THERAPEUTIC EXERCISES: CPT | Mod: GP | Performed by: PHYSICAL THERAPIST

## 2019-02-27 NOTE — PROGRESS NOTES
02/27/19 1300   Signing Clinician's Name / Credentials   Signing clinician's name / credentials Alyx Toledo DPT, OCS, Cert DN   Session Number   Session Number 17/18   Adult Goals   PT Ortho Eval Goals 1;2;3;4   Ortho Goal 1   Goal Identifier STG 1   Goal Description Pt will demonstrate knowledge/understanding of HEP and report daily compliance   Target Date 02/20/19   Date Met 02/27/19   Ortho Goal 2   Goal Identifier LTG 1   Goal Description Pt will use RUE during daily activities without being limited by pain including overhead motions   Target Date 03/06/19   Date Met 02/27/19   Ortho Goal 3   Goal Identifier LTG 2   Goal Description Pt will use RUE during daily activities without being limited by impaired ROM and /or weakness   Target Date 03/20/19   Ortho Goal 4   Goal Identifier LTG 3   Goal Description Patient will demonstrate R shoulder FLEX and ABD to 140 or greater in order to perform all home tasks   Target Date 03/20/19   Subjective Report   Subjective Report States that his shoulder has been feeling very good but he is still only about 50% improved since starting PT. He is interested in having a few more sessions leading up to his doctor appointment.  He has soreness in his shoulder when he first gets up, but it gets better once he exercises.  His pain at worst is 6/10 and he gets minimal pain at rest   Objective Measures   Objective Measures Objective Measure 1   Objective Measure 1   Objective Measure Shoulder ROM and strength   Details Shoulder ROM: FLEX, ABD, ER and IR.  Strength: FLEX, ABD, ER IR   Treatment Interventions   Interventions Therapeutic Procedure/Exercise   Therapeutic Procedure/exercise   Therapeutic Procedures: strength, endurance, ROM, flexibillity minutes (09584) 25   Skilled Intervention ther ex   Patient Response good   Treatment Detail UBE x 5' level 7 resistance, wand figure 8s, abduction with 1#s x 2 sets of 10 reps, bicep curls 7 #s x 2 sets of 10 reps, seated rows  4 plates 2 sets of 10 reps, pull downs 4 plates 2 sets of 10 reps, pulleys for motion flex and abd, wall doorway flex strech, ER/IR with tband prescribed for home   Assessments Completed   Assessments Completed Patient has made good improvements, but is not as strong as he would like to be yet   Plan   Home program Added IR/ER with band to HEP   Plan for next session f/u 1x every 2 weeks for up to 3 more sessions   Total Session Time   Timed Code Treatment Minutes 25   Total Treatment Time (sum of timed and untimed services) 25

## 2019-03-13 ENCOUNTER — HOSPITAL ENCOUNTER (OUTPATIENT)
Dept: PHYSICAL THERAPY | Facility: HOSPITAL | Age: 74
Setting detail: THERAPIES SERIES
End: 2019-03-13
Attending: FAMILY MEDICINE
Payer: MEDICARE

## 2019-03-13 PROCEDURE — 97110 THERAPEUTIC EXERCISES: CPT | Mod: GP | Performed by: PHYSICAL THERAPIST

## 2019-03-14 DIAGNOSIS — E03.9 HYPOTHYROIDISM, UNSPECIFIED TYPE: ICD-10-CM

## 2019-03-15 NOTE — TELEPHONE ENCOUNTER
Synthroid  Last Written Prescription Date: 6/18/18  Last Fill Quantity: 30 # of Refills: 8  Last Office Visit: 1/22/19

## 2019-03-18 RX ORDER — LEVOTHYROXINE SODIUM 50 UG/1
TABLET ORAL
Qty: 30 TABLET | Refills: 0 | Status: SHIPPED | OUTPATIENT
Start: 2019-03-18 | End: 2019-04-23

## 2019-03-27 ENCOUNTER — HOSPITAL ENCOUNTER (OUTPATIENT)
Dept: PHYSICAL THERAPY | Facility: HOSPITAL | Age: 74
Setting detail: THERAPIES SERIES
End: 2019-03-27
Attending: FAMILY MEDICINE
Payer: MEDICARE

## 2019-03-27 PROCEDURE — 97110 THERAPEUTIC EXERCISES: CPT | Mod: GP | Performed by: PHYSICAL THERAPIST

## 2019-04-03 NOTE — PROGRESS NOTES
SUBJECTIVE:   Christiano Leon is a 74 year old male who presents to clinic today for the following health issues:    He has bilateral conjunctivitis was in the ER but they gave him a prescription he states only for 1 I and needs more the medication.  It does seem to be helping.  He also has been in physical therapy for right shoulder pain is quite at baseline but close and would like referral for a few more sessions.    Hyperlipidemia Follow-Up      Rate your low fat/cholesterol diet?: not monitoring fat    Taking statin?  Yes, no muscle aches from statin    Other lipid medications/supplements?:  none    Hypothyroidism Follow-up      Since last visit, patient describes the following symptoms: Weight stable, no hair loss, no skin changes, no constipation, no loose stools      Amount of exercise or physical activity: 4-5 days/week for an average of less than 15 minutes    Problems taking medications regularly: No    Medication side effects: none    Diet: regular (no restrictions)        [facility]    Christiano Leon, 74 year old, male presents with   Chief Complaint   Patient presents with     Thyroid Problem     Lipids       PAST MEDICAL HISTORY:  Past Medical History:   Diagnosis Date     BPH (benign prostatic hyperplasia)      Poor dentition        PAST SURGICAL HISTORY:  Past Surgical History:   Procedure Laterality Date     Bilateral cataract       HERNIA REPAIR Right 2007?     Right knee meniscectomy Right        MEDICATIONS:  Prior to Admission medications    Medication Sig Start Date End Date Taking? Authorizing Provider   Acetaminophen (TYLENOL PO) Take 1,000 mg by mouth every 6 hours as needed for mild pain or fever   Yes Reported, Patient   carbamide peroxide (DEBROX) 6.5 % otic solution Place 5 drops Into the left ear 2 times daily Do this once a month for 4 days each time, for the next 6 months 9/26/18  Yes Carmen Kay APRN CNP    MG capsule TAKE 2 CAPSULES BY MOUTH TWICE A DAY  "4/8/19  Yes ROBERT Akbar MD   levothyroxine (SYNTHROID/LEVOTHROID) 50 MCG tablet TAKE 1 TABLET BY MOUTH DAILY - GENERIC FOR SYNTHROID 3/18/19  Yes ROBERT Akbar MD   QUEtiapine (SEROQUEL) 300 MG tablet Take 3 tablets (900 mg) by mouth At Bedtime 1/7/19 1/7/20 Yes Don Sen MD   simvastatin (ZOCOR) 40 MG tablet Take 1 tablet (40 mg) by mouth At Bedtime 6/4/18  Yes Modesta Akbar MD   tobramycin (TOBREX) 0.3 % ophthalmic solution Place 2 drops into both eyes every 6 hours for 5 days 4/9/19 4/14/19 Yes ROBERT Akbar MD   VITAMIN D3 1000 units tablet TAKE 1 TABLET BY MOUTH ONCE DAILY 5/1/18  Yes ROBERT Akbar MD   Docusate Sodium (COLACE PO) Take 200 mg by mouth 2 times daily    Reported, Patient   tobramycin (TOBREX) 0.3 % ophthalmic solution Place 1-2 drops into the right eye every 6 hours for 7 days 4/5/19 4/12/19  Alan Campos PA-C       ALLERGIES:     Allergies   Allergen Reactions     Nickel      Possible. Cheap metal.     Penicillins      Seasonal Allergies      Perfumes       ROS:  Constitutional, neuro, ENT, endocrine, pulmonary, cardiac, gastrointestinal, genitourinary, musculoskeletal, integument and psychiatric systems are negative, except as otherwise noted.      EXAM:  /70 (BP Location: Right arm, Patient Position: Sitting, Cuff Size: Adult Regular)   Pulse 74   Temp 97.4  F (36.3  C) (Tympanic)   Ht 1.803 m (5' 11\")   Wt 75.3 kg (166 lb)   SpO2 98%   BMI 23.15 kg/m   Body mass index is 23.15 kg/m .   GENERAL APPEARANCE: healthy, alert and no distress  EYES: Pupils reactive eyes are conjugate both eyes have conjunctival injection with perilimbal sparing  HENT: nose and mouth without ulcers or lesions  NECK: no adenopathy, no asymmetry, masses, or scars and thyroid normal to palpation  RESP: lungs clear to auscultation - no rales, rhonchi or wheezes  CV: regular rates and rhythm, normal S1 S2, no S3 or S4 and no murmur, click or rub  ABDOMEN: soft, nontender, without " hepatosplenomegaly or masses and bowel sounds normal  ORTHO: Can abduct past 90 degrees but feels a slight discomfort more in the anterior aspect of the shoulder no effusion no focal weakness  Lab/ X-ray  No results found for this or any previous visit (from the past 24 hour(s)).    ASSESSMENT/PLAN:    ICD-10-CM    1. Acute bacterial conjunctivitis of both eyes H10.33 tobramycin (TOBREX) 0.3 % ophthalmic solution   2. Acute pain of right shoulder M25.511 PHYSICAL THERAPY REFERRAL   3. Hypothyroidism, unspecified type E03.9 TSH with free T4 reflex   4. Hypercholesterolemia E78.00 Lipid Profile   Treat conjunctivitis with Tobrex.  For the shoulder pain wrote a new referral for physical therapy he has a session tomorrow and can extend it.  He will go to lab to check TSH follow-up hypothyroidism is on Synthroid.  Also has hypercholesterolemia states he is fasting we will check sgot/ lipid today      MALVIN Akbar MD  April 9, 2019

## 2019-04-04 DIAGNOSIS — K59.00 CONSTIPATION, UNSPECIFIED CONSTIPATION TYPE: ICD-10-CM

## 2019-04-05 ENCOUNTER — HOSPITAL ENCOUNTER (EMERGENCY)
Facility: HOSPITAL | Age: 74
Discharge: HOME | End: 2019-04-05
Attending: PHYSICIAN ASSISTANT | Admitting: PHYSICIAN ASSISTANT
Payer: MEDICARE

## 2019-04-05 VITALS
TEMPERATURE: 97.3 F | DIASTOLIC BLOOD PRESSURE: 94 MMHG | OXYGEN SATURATION: 98 % | HEART RATE: 75 BPM | RESPIRATION RATE: 18 BRPM | SYSTOLIC BLOOD PRESSURE: 143 MMHG

## 2019-04-05 DIAGNOSIS — H10.31 ACUTE CONJUNCTIVITIS OF RIGHT EYE, UNSPECIFIED ACUTE CONJUNCTIVITIS TYPE: Primary | ICD-10-CM

## 2019-04-05 PROCEDURE — 99213 OFFICE O/P EST LOW 20 MIN: CPT | Mod: Z6 | Performed by: PHYSICIAN ASSISTANT

## 2019-04-05 PROCEDURE — G0463 HOSPITAL OUTPT CLINIC VISIT: HCPCS

## 2019-04-05 RX ORDER — TOBRAMYCIN 3 MG/ML
1-2 SOLUTION/ DROPS OPHTHALMIC EVERY 6 HOURS
Qty: 3 ML | Refills: 0 | Status: SHIPPED | OUTPATIENT
Start: 2019-04-05 | End: 2019-04-30

## 2019-04-05 ASSESSMENT — ENCOUNTER SYMPTOMS
EYE DISCHARGE: 1
FEVER: 0
EYE REDNESS: 1
COUGH: 0
SHORTNESS OF BREATH: 0
SINUS PAIN: 0
SORE THROAT: 0
VOMITING: 0
DIARRHEA: 0
NAUSEA: 0

## 2019-04-05 NOTE — TELEPHONE ENCOUNTER
MG capsule    Last Written Prescription Date:  02/01/2019  Last Fill Quantity: 120,   # refills: 1  Last Office Visit: 01/22/2019  Future Office visit:    Next 5 appointments (look out 90 days)    Apr 09, 2019  1:00 PM CDT  (Arrive by 12:45 PM)  Office Visit with ROBERT Akbar MD  [facility] ([facility] ) [address]  [place] [address]  [phone]           Routing refill request to provider for review/approval because:

## 2019-04-05 NOTE — ED PROVIDER NOTES
History     Chief Complaint   Patient presents with     Conjunctivitis     Bilaterally for two days.     HPI  Christiano Leon is a 74 year old male who presents to urgent care for evaluation of red right eye.  Patient states that this started approximately 2 days ago.  Patient denies any mechanism of injury to his eye.  She states that he has been having purulent discharge and crusting from his right eye along with irritation.  Patient denies any fever, cold symptoms, vision changes, light sensitivity, or any other associated symptoms at this time.  Patient has not taken anything over-the-counter.    Allergies:  Allergies   Allergen Reactions     Nickel      Possible. Cheap metal.     Penicillins      Seasonal Allergies      Perfumes       Problem List:    Patient Active Problem List    Diagnosis Date Noted     Hypercholesterolemia 10/04/2018     Priority: Medium     ACP (advance care planning) 07/10/2017     Priority: Medium     Advance Care Planning 7/10/2017: ACP Review of Chart / Resources Provided:  Reviewed chart for advance care plan.  Christiano Leon has no plan or code status on file. Discussed available resources and provided with information.   Added by TERRENCE MARTÍNEZ             Acquired hypothyroidism 07/10/2017     Priority: Medium     Dementia with behavioral disturbance, unspecified dementia type 07/10/2017     Priority: Medium     Alcohol use disorder 07/10/2017     Priority: Medium        Past Medical History:    Past Medical History:   Diagnosis Date     BPH (benign prostatic hyperplasia)      Poor dentition        Past Surgical History:    Past Surgical History:   Procedure Laterality Date     Bilateral cataract       HERNIA REPAIR Right 2007?     Right knee meniscectomy Right        Family History:    No family history on file.    Social History:  Marital Status:  Single [1]  Social History     Tobacco Use     Smoking status: Former Smoker     Packs/day: 0.50     Types: Cigarettes     Last attempt  to quit: 1993     Years since quittin.8     Smokeless tobacco: Never Used   Substance Use Topics     Alcohol use: No     Drug use: Not on file        Medications:      tobramycin (TOBREX) 0.3 % ophthalmic solution   Acetaminophen (TYLENOL PO)   carbamide peroxide (DEBROX) 6.5 % otic solution   Docusate Sodium (COLACE PO)    MG capsule   levothyroxine (SYNTHROID/LEVOTHROID) 50 MCG tablet   order for DME   QUEtiapine (SEROQUEL) 300 MG tablet   simvastatin (ZOCOR) 40 MG tablet   VITAMIN D3 1000 units tablet         Review of Systems   Constitutional: Negative for fever.   HENT: Negative for congestion, ear pain, sinus pain and sore throat.    Eyes: Positive for discharge and redness.   Respiratory: Negative for cough and shortness of breath.    Gastrointestinal: Negative for diarrhea, nausea and vomiting.       Physical Exam   BP: 143/94  Pulse: 75  Temp: 97.3  F (36.3  C)  Resp: 18  SpO2: 98 %      Physical Exam   Constitutional: He is oriented to person, place, and time. He appears well-developed and well-nourished. No distress.   HENT:   Head: Normocephalic.   Right Ear: Tympanic membrane normal.   Left Ear: Tympanic membrane normal.   Eyes: EOM are normal. Pupils are equal, round, and reactive to light. Right eye exhibits no discharge and no exudate. Right conjunctiva is injected.   Cardiovascular: Normal rate, regular rhythm and normal heart sounds.   Pulmonary/Chest: Effort normal and breath sounds normal. No respiratory distress.   Neurological: He is alert and oriented to person, place, and time.   Nursing note and vitals reviewed.      ED Course        Procedures              Critical Care time:               No results found for this or any previous visit (from the past 24 hour(s)).    Medications - No data to display    Assessments & Plan (with Medical Decision Making)     I have reviewed the nursing notes.    I have reviewed the findings, diagnosis, plan and need for follow up with the  patient.  #1.  Conjunctivitis, right    Discussed exam findings with patient.  Patient is prescribed Tobrex ophthalmic drops; take as prescribed.  Proper hand hygiene is discussed at length with patient.  If patient develops any pain in the eye or vision changes he should go to emergency department immediately.  If symptoms have not improved over the next 3-5 days please return to urgent care or follow-up with primary care provider.  Patient verbalizes understanding and agreement of plan.       Medication List      Started    tobramycin 0.3 % ophthalmic solution  Commonly known as:  TOBREX  1-2 drops, Right Eye, EVERY 6 HOURS            Final diagnoses:   Acute conjunctivitis of right eye, unspecified acute conjunctivitis type       4/5/2019   [facility]     Alan Campos PA-C  04/05/19 [id number]

## 2019-04-05 NOTE — ED AVS SNAPSHOT
[facility]  [address] [place] [address]  Phone:  [phone]                                    Christiano Leon   MRN: [id number]    Department:  [facility]   Date of Visit:  4/5/2019           After Visit Summary Signature Page    I have received my discharge instructions, and my questions have been answered. I have discussed any challenges I see with this plan with the nurse or doctor.    ..........................................................................................................................................  Patient/Patient Representative Signature      ..........................................................................................................................................  Patient Representative Print Name and Relationship to Patient    ..................................................               ................................................  Date                                   Time    ..........................................................................................................................................  Reviewed by Signature/Title    ...................................................              ..............................................  Date                                               Time          22EPIC Rev 08/18

## 2019-04-08 RX ORDER — DOCUSATE SODIUM 100 MG/1
CAPSULE, LIQUID FILLED ORAL
Qty: 120 CAPSULE | Refills: 1 | Status: SHIPPED | OUTPATIENT
Start: 2019-04-08 | End: 2019-04-30

## 2019-04-09 ENCOUNTER — OFFICE VISIT (OUTPATIENT)
Dept: FAMILY MEDICINE | Facility: OTHER | Age: 74
End: 2019-04-09
Attending: FAMILY MEDICINE
Payer: MEDICARE

## 2019-04-09 VITALS
SYSTOLIC BLOOD PRESSURE: 120 MMHG | TEMPERATURE: 97.4 F | OXYGEN SATURATION: 98 % | DIASTOLIC BLOOD PRESSURE: 70 MMHG | HEIGHT: 71 IN | WEIGHT: 166 LBS | BODY MASS INDEX: 23.24 KG/M2 | HEART RATE: 74 BPM

## 2019-04-09 DIAGNOSIS — E78.00 HYPERCHOLESTEROLEMIA: ICD-10-CM

## 2019-04-09 DIAGNOSIS — M25.511 ACUTE PAIN OF RIGHT SHOULDER: ICD-10-CM

## 2019-04-09 DIAGNOSIS — E03.9 HYPOTHYROIDISM, UNSPECIFIED TYPE: ICD-10-CM

## 2019-04-09 DIAGNOSIS — H10.33 ACUTE BACTERIAL CONJUNCTIVITIS OF BOTH EYES: Primary | ICD-10-CM

## 2019-04-09 LAB
AST SERPL W P-5'-P-CCNC: 9 U/L (ref 0–45)
CHOLEST SERPL-MCNC: 118 MG/DL
HDLC SERPL-MCNC: 42 MG/DL
LDLC SERPL CALC-MCNC: 59 MG/DL
NONHDLC SERPL-MCNC: 76 MG/DL
TRIGL SERPL-MCNC: 84 MG/DL
TSH SERPL DL<=0.005 MIU/L-ACNC: 1.82 MU/L (ref 0.4–4)

## 2019-04-09 PROCEDURE — G0463 HOSPITAL OUTPT CLINIC VISIT: HCPCS | Mod: 25

## 2019-04-09 PROCEDURE — 84450 TRANSFERASE (AST) (SGOT): CPT | Mod: ZL | Performed by: FAMILY MEDICINE

## 2019-04-09 PROCEDURE — 80061 LIPID PANEL: CPT | Mod: ZL | Performed by: FAMILY MEDICINE

## 2019-04-09 PROCEDURE — G0463 HOSPITAL OUTPT CLINIC VISIT: HCPCS

## 2019-04-09 PROCEDURE — 99214 OFFICE O/P EST MOD 30 MIN: CPT | Performed by: FAMILY MEDICINE

## 2019-04-09 PROCEDURE — 84443 ASSAY THYROID STIM HORMONE: CPT | Mod: ZL | Performed by: FAMILY MEDICINE

## 2019-04-09 PROCEDURE — 36415 COLL VENOUS BLD VENIPUNCTURE: CPT | Mod: ZL | Performed by: FAMILY MEDICINE

## 2019-04-09 RX ORDER — TOBRAMYCIN 3 MG/ML
2 SOLUTION/ DROPS OPHTHALMIC EVERY 6 HOURS
Qty: 1 BOTTLE | Refills: 0 | Status: SHIPPED | OUTPATIENT
Start: 2019-04-09 | End: 2019-04-30

## 2019-04-09 ASSESSMENT — PAIN SCALES - GENERAL: PAINLEVEL: SEVERE PAIN (6)

## 2019-04-09 ASSESSMENT — MIFFLIN-ST. JEOR: SCORE: 1505.1

## 2019-04-09 NOTE — NURSING NOTE
"Chief Complaint   Patient presents with     Thyroid Problem     Lipids       Initial /70 (BP Location: Right arm, Patient Position: Sitting, Cuff Size: Adult Regular)   Pulse 74   Temp 97.4  F (36.3  C) (Tympanic)   Ht 1.803 m (5' 11\")   Wt 75.3 kg (166 lb)   SpO2 98%   BMI 23.15 kg/m   Estimated body mass index is 23.15 kg/m  as calculated from the following:    Height as of this encounter: 1.803 m (5' 11\").    Weight as of this encounter: 75.3 kg (166 lb).  Medication Reconciliation: complete    Danna Jha LPN  "

## 2019-04-10 ENCOUNTER — HOSPITAL ENCOUNTER (OUTPATIENT)
Dept: PHYSICAL THERAPY | Facility: HOSPITAL | Age: 74
Setting detail: THERAPIES SERIES
End: 2019-04-10
Attending: FAMILY MEDICINE
Payer: MEDICARE

## 2019-04-10 PROCEDURE — 97110 THERAPEUTIC EXERCISES: CPT | Mod: GP

## 2019-04-17 ENCOUNTER — HOSPITAL ENCOUNTER (OUTPATIENT)
Dept: PHYSICAL THERAPY | Facility: HOSPITAL | Age: 74
Setting detail: THERAPIES SERIES
End: 2019-04-17
Attending: FAMILY MEDICINE
Payer: MEDICARE

## 2019-04-17 PROCEDURE — 97110 THERAPEUTIC EXERCISES: CPT | Mod: GP

## 2019-04-23 DIAGNOSIS — E03.9 HYPOTHYROIDISM, UNSPECIFIED TYPE: ICD-10-CM

## 2019-04-24 NOTE — TELEPHONE ENCOUNTER
Synthroid       Last Written Prescription Date:  3/18/2019  Last Fill Quantity: 30,   # refills: 0  Last Office Visit: 4/9/2019  Future Office visit:

## 2019-04-25 RX ORDER — LEVOTHYROXINE SODIUM 50 UG/1
50 TABLET ORAL DAILY
Qty: 90 TABLET | Refills: 0 | Status: SHIPPED | OUTPATIENT
Start: 2019-04-25 | End: 2019-08-16

## 2019-04-26 ENCOUNTER — HOSPITAL ENCOUNTER (OUTPATIENT)
Dept: PHYSICAL THERAPY | Facility: HOSPITAL | Age: 74
Setting detail: THERAPIES SERIES
End: 2019-04-26
Attending: FAMILY MEDICINE
Payer: MEDICARE

## 2019-04-26 PROCEDURE — 97110 THERAPEUTIC EXERCISES: CPT | Mod: GP | Performed by: PHYSICAL THERAPIST

## 2019-04-26 NOTE — PROGRESS NOTES
Discharge Summary     04/26/19 1400   Signing Clinician's Name / Credentials   Signing clinician's name / credentials Alyx Toledo DPT, OCS, Cert DN   Session Number   Session Number 22   Progress Note/Recertification   Progress Note Completed Date 04/26/19   Adult Goals   PT Ortho Eval Goals 1;2;3;4   Ortho Goal 1   Goal Identifier STG 1   Goal Description Pt will demonstrate knowledge/understanding of HEP and report daily compliance   Target Date 02/20/19   Date Met 02/27/19   Ortho Goal 2   Goal Identifier LTG 1   Goal Description Pt will use RUE during daily activities without being limited by pain including overhead motions   Target Date 03/06/19   Date Met 02/27/19   Ortho Goal 3   Goal Identifier LTG 2   Goal Description Pt will use RUE during daily activities without being limited by impaired ROM and /or weakness   Target Date 03/20/19   Date Met 04/26/19   Ortho Goal 4   Goal Identifier LTG 3   Goal Description Patient will demonstrate R shoulder FLEX and ABD to 140 or greater in order to perform all home tasks   Target Date 03/20/19   Date Met 04/26/19   Subjective Report   Subjective Report Patient states his shoulder continues to feel good and he has not had much discomfort, just mild weakness still lingering   Objective Measures   Objective Measures Objective Measure 1   Objective Measure 1   Objective Measure Shoulder ROM   Treatment Interventions   Interventions Therapeutic Procedure/Exercise   Therapeutic Procedure/exercise   Therapeutic Procedures: strength, endurance, ROM, flexibillity minutes (01699) 30   Skilled Intervention ther ex   Patient Response good   Treatment Detail UBE x 5' level 7 resistance, wand figure 8s, abduction with 1#s x 2 sets of 10 reps, bicep curls 7 #s x 2 sets of 10 reps, seated rows 4 plates 2 sets of 10 reps, pull downs 4 plates 2 sets of 10 reps, pulleys for motion flex and abd, wall doorway flex strech, ER/IR with tband prescribed for home   Communication with  other professionals   Communication with other professionals Patient has met goals and is ready to DC   Plan   Plan for next session DC   Total Session Time   Timed Code Treatment Minutes 30   Total Treatment Time (sum of timed and untimed services) 30

## 2019-04-30 ENCOUNTER — OFFICE VISIT (OUTPATIENT)
Dept: FAMILY MEDICINE | Facility: OTHER | Age: 74
End: 2019-04-30
Attending: FAMILY MEDICINE
Payer: MEDICARE

## 2019-04-30 VITALS
HEIGHT: 71 IN | RESPIRATION RATE: 16 BRPM | WEIGHT: 165.6 LBS | TEMPERATURE: 97.8 F | DIASTOLIC BLOOD PRESSURE: 80 MMHG | HEART RATE: 75 BPM | BODY MASS INDEX: 23.18 KG/M2 | SYSTOLIC BLOOD PRESSURE: 136 MMHG | OXYGEN SATURATION: 96 %

## 2019-04-30 DIAGNOSIS — K21.00 GASTROESOPHAGEAL REFLUX DISEASE WITH ESOPHAGITIS: ICD-10-CM

## 2019-04-30 DIAGNOSIS — R13.19 ESOPHAGEAL DYSPHAGIA: Primary | ICD-10-CM

## 2019-04-30 LAB
BASOPHILS # BLD AUTO: 0 10E9/L (ref 0–0.2)
BASOPHILS NFR BLD AUTO: 0.6 %
DIFFERENTIAL METHOD BLD: NORMAL
EOSINOPHIL # BLD AUTO: 0.3 10E9/L (ref 0–0.7)
EOSINOPHIL NFR BLD AUTO: 4.6 %
ERYTHROCYTE [DISTWIDTH] IN BLOOD BY AUTOMATED COUNT: 13.5 % (ref 10–15)
HCT VFR BLD AUTO: 41.5 % (ref 40–53)
HGB BLD-MCNC: 13.7 G/DL (ref 13.3–17.7)
IMM GRANULOCYTES # BLD: 0 10E9/L (ref 0–0.4)
IMM GRANULOCYTES NFR BLD: 0.6 %
LYMPHOCYTES # BLD AUTO: 1.5 10E9/L (ref 0.8–5.3)
LYMPHOCYTES NFR BLD AUTO: 28.3 %
MCH RBC QN AUTO: 30.4 PG (ref 26.5–33)
MCHC RBC AUTO-ENTMCNC: 33 G/DL (ref 31.5–36.5)
MCV RBC AUTO: 92 FL (ref 78–100)
MONOCYTES # BLD AUTO: 0.6 10E9/L (ref 0–1.3)
MONOCYTES NFR BLD AUTO: 11 %
NEUTROPHILS # BLD AUTO: 3 10E9/L (ref 1.6–8.3)
NEUTROPHILS NFR BLD AUTO: 54.9 %
NRBC # BLD AUTO: 0 10*3/UL
NRBC BLD AUTO-RTO: 0 /100
PLATELET # BLD AUTO: 158 10E9/L (ref 150–450)
RBC # BLD AUTO: 4.5 10E12/L (ref 4.4–5.9)
WBC # BLD AUTO: 5.5 10E9/L (ref 4–11)

## 2019-04-30 PROCEDURE — 99213 OFFICE O/P EST LOW 20 MIN: CPT | Performed by: FAMILY MEDICINE

## 2019-04-30 PROCEDURE — 36415 COLL VENOUS BLD VENIPUNCTURE: CPT | Mod: ZL | Performed by: FAMILY MEDICINE

## 2019-04-30 PROCEDURE — G0463 HOSPITAL OUTPT CLINIC VISIT: HCPCS

## 2019-04-30 PROCEDURE — 85025 COMPLETE CBC W/AUTO DIFF WBC: CPT | Mod: ZL | Performed by: FAMILY MEDICINE

## 2019-04-30 RX ORDER — PANTOPRAZOLE SODIUM 40 MG/1
40 TABLET, DELAYED RELEASE ORAL DAILY
Qty: 30 TABLET | Refills: 3 | Status: SHIPPED | OUTPATIENT
Start: 2019-04-30 | End: 2019-08-21

## 2019-04-30 ASSESSMENT — MIFFLIN-ST. JEOR: SCORE: 1503.29

## 2019-04-30 ASSESSMENT — PAIN SCALES - GENERAL: PAINLEVEL: NO PAIN (0)

## 2019-04-30 NOTE — NURSING NOTE
"Chief Complaint   Patient presents with     Pharyngitis     sore throat w/ swallowig difficulties       Initial /80   Pulse 75   Temp 97.8  F (36.6  C) (Tympanic)   Resp 16   Ht 1.803 m (5' 11\")   Wt 75.1 kg (165 lb 9.6 oz)   SpO2 96%   BMI 23.10 kg/m   Estimated body mass index is 23.1 kg/m  as calculated from the following:    Height as of this encounter: 1.803 m (5' 11\").    Weight as of this encounter: 75.1 kg (165 lb 9.6 oz).  Medication Reconciliation: complete    Carol Carter, CARLOS  "

## 2019-05-02 ENCOUNTER — HOSPITAL ENCOUNTER (OUTPATIENT)
Dept: GENERAL RADIOLOGY | Facility: HOSPITAL | Age: 74
Discharge: HOME | End: 2019-05-02
Attending: FAMILY MEDICINE | Admitting: FAMILY MEDICINE
Payer: MEDICARE

## 2019-05-02 DIAGNOSIS — R13.19 ESOPHAGEAL DYSPHAGIA: ICD-10-CM

## 2019-05-02 PROCEDURE — 74240 X-RAY XM UPR GI TRC 1CNTRST: CPT | Mod: TC

## 2019-05-02 PROCEDURE — 25500045 ZZH RX 255: Performed by: RADIOLOGY

## 2019-05-02 RX ADMIN — ANTACID/ANTIFLATULENT 4 G: 380; 550; 10; 10 GRANULE, EFFERVESCENT ORAL at 10:15

## 2019-05-02 NOTE — PROGRESS NOTES
"  SUBJECTIVE:   Christiano Leon is a 74 year old male who presents to clinic today for the following   health issues:    Dysphagia      Duration: on going    Description (location/character/radiation): sore throat w/ thick yellow phlegm, hard to swallow food. Throat is very dry    Intensity:  mild    Accompanying signs and symptoms: none    History (similar episodes/previous evaluation): Seen by PCP    Precipitating or alleviating factors: None    Therapies tried and outcome: None     Patient presents with staff member from his living facility to discuss increasingly difficult swallowing. He reports this has been going on for \"several years.\" Staff note that he just recently started mentioning the sensation of food, particularly meat, getting stuck in the esophagus. He states he has to drink fluids to get it to slowly go down. He has not choked. He is not having difficulty swallowing pills. He denies coughing or vomiting after eating.     He states he gets sore throats often. Staff note lots of coughing at night. He states he has long history of acid reflux after meals. He is not drinking (states he \"never touched the stuff\"). Weight has been stable.     Additional history: as documented    Reviewed  and updated as needed this visit by clinical staff  Tobacco  Allergies  Meds  Problems  Med Hx  Surg Hx  Fam Hx         Reviewed and updated as needed this visit by Provider  Tobacco  Allergies  Meds  Problems  Med Hx  Surg Hx  Fam Hx         Labs reviewed in EPIC    ROS:  Constitutional, HEENT, cardiovascular, pulmonary, gi and gu systems are negative, except as otherwise noted.    OBJECTIVE:     /80   Pulse 75   Temp 97.8  F (36.6  C) (Tympanic)   Resp 16   Ht 1.803 m (5' 11\")   Wt 75.1 kg (165 lb 9.6 oz)   SpO2 96%   BMI 23.10 kg/m    Body mass index is 23.1 kg/m .  GENERAL: healthy, alert and no distress  NECK: no adenopathy, no asymmetry, masses, or scars and thyroid normal to " palpation  RESP: lungs clear to auscultation - no rales, rhonchi or wheezes  CV: regular rate and rhythm, normal S1 S2, no S3 or S4, no murmur, click or rub, no peripheral edema   ABDOMEN: soft, nontender, no hepatosplenomegaly, no masses and bowel sounds normal  NEURO: Normal strength and tone, mentation intact and speech normal    Diagnostic Test Results:  Results for orders placed or performed in visit on 04/30/19   CBC with platelets and differential   Result Value Ref Range    WBC 5.5 4.0 - 11.0 10e9/L    RBC Count 4.50 4.4 - 5.9 10e12/L    Hemoglobin 13.7 13.3 - 17.7 g/dL    Hematocrit 41.5 40.0 - 53.0 %    MCV 92 78 - 100 fl    MCH 30.4 26.5 - 33.0 pg    MCHC 33.0 31.5 - 36.5 g/dL    RDW 13.5 10.0 - 15.0 %    Platelet Count 158 150 - 450 10e9/L    Diff Method Automated Method     % Neutrophils 54.9 %    % Lymphocytes 28.3 %    % Monocytes 11.0 %    % Eosinophils 4.6 %    % Basophils 0.6 %    % Immature Granulocytes 0.6 %    Nucleated RBCs 0 0 /100    Absolute Neutrophil 3.0 1.6 - 8.3 10e9/L    Absolute Lymphocytes 1.5 0.8 - 5.3 10e9/L    Absolute Monocytes 0.6 0.0 - 1.3 10e9/L    Absolute Eosinophils 0.3 0.0 - 0.7 10e9/L    Absolute Basophils 0.0 0.0 - 0.2 10e9/L    Abs Immature Granulocytes 0.0 0 - 0.4 10e9/L    Absolute Nucleated RBC 0.0        ASSESSMENT/PLAN:     1. Esophageal dysphagia / Gastroesophageal reflux disease with esophagitis  Patient is a poor historian, however, has night time cough, intermittent sore throat and acid taste in the mouth suggesting GERD. Am concerned about his history of difficulty with swallowing as he has history of etoh and smoker per chart. Advised softer foods, small bites for now. Esophagram ordered, patient will then follow up with Gen Surg for possible EGD.   - XR Esophagram w Upper GI; Future  - GENERAL SURG ADULT REFERRAL  - CBC with platelets and differential  - pantoprazole (PROTONIX) 40 MG EC tablet; Take 1 tablet (40 mg) by mouth daily  Dispense: 30 tablet;  Refill: 3      Sweta Marcus MD  [facility] [place]

## 2019-05-06 ENCOUNTER — OFFICE VISIT (OUTPATIENT)
Dept: SURGERY | Facility: OTHER | Age: 74
End: 2019-05-06
Attending: FAMILY MEDICINE
Payer: MEDICARE

## 2019-05-06 VITALS
TEMPERATURE: 96.9 F | WEIGHT: 165 LBS | HEART RATE: 68 BPM | BODY MASS INDEX: 23.1 KG/M2 | DIASTOLIC BLOOD PRESSURE: 68 MMHG | OXYGEN SATURATION: 96 % | HEIGHT: 71 IN | SYSTOLIC BLOOD PRESSURE: 118 MMHG

## 2019-05-06 DIAGNOSIS — R13.19 ESOPHAGEAL DYSPHAGIA: ICD-10-CM

## 2019-05-06 PROCEDURE — G0463 HOSPITAL OUTPT CLINIC VISIT: HCPCS

## 2019-05-06 PROCEDURE — 99214 OFFICE O/P EST MOD 30 MIN: CPT | Performed by: SURGERY

## 2019-05-06 ASSESSMENT — PAIN SCALES - GENERAL: PAINLEVEL: NO PAIN (1)

## 2019-05-06 ASSESSMENT — MIFFLIN-ST. JEOR: SCORE: 1500.57

## 2019-05-06 NOTE — PATIENT INSTRUCTIONS
Thank you for allowing Dr Guzmán and our surgical team to participate in your care.  If you have a scheduling or an appointment question please contact Rohini, our Health Unit Coordinator, at her direct line [phone].   ALL nursing questions or concerns can be directed to your surgical nurse at: [phone]-[phone]    You are scheduled for a:  Esophagogastroduodenoscopy with possible biopsy.  Your procedure date is: Friday, May 10, 2019    Your time is yet to be determined.  Same day surgery will call you the day prior with your arrival time.      Nothing to eat after midnight the night prior to your procedure.  Nothing to drink 4 hours prior to your arrival time.        HOW TO PREPARE-        You need a friend or family member available to drive you home AND stay with you for 4 hours after you leave the hospital. You will not be allowed to drive yourself. IF you need to take a taxi or the bus you MUST have a responsible person to ride with you. YOUR PROCEDURE WILL BE CANCELLED IF YOU DO NOT HAVE A RESPONSIBLE ADULT TO DRIVE YOU HOME.       You need to call our Surgery Education Nurses 1-2 weeks prior to your surgery date at [phone] or toll free [phone]. Please have you medication and allergy lists ready.       Stop your aspirin or other NSAIDs(Ibuprofen, Motrin, Aleve, Celebrex, Naproxen, etc...) 7 days before your surgery.  Tylenol is safe.      Hospital admitting will call you the day before your surgery with your arrival time. If you are scheduled on a Monday admitting will call you the Friday before.      Please call your primary care physician if you should become ill within 24 hours of scheduled surgery. (ex.vomiting, diarrhea, fever)

## 2019-05-06 NOTE — NURSING NOTE
"Chief Complaint   Patient presents with     Consult     Esophageal dysphagia- Dr JEFF Akbar is referring       Initial /68 (BP Location: Right arm, Patient Position: Sitting, Cuff Size: Adult Regular)   Pulse 68   Temp 96.9  F (36.1  C) (Tympanic)   Ht 1.803 m (5' 11\")   Wt 74.8 kg (165 lb)   SpO2 96%   BMI 23.01 kg/m   Estimated body mass index is 23.01 kg/m  as calculated from the following:    Height as of this encounter: 1.803 m (5' 11\").    Weight as of this encounter: 74.8 kg (165 lb).  Medication Reconciliation: complete    Jessica Schilling LPN    "

## 2019-05-07 ENCOUNTER — ANESTHESIA EVENT (OUTPATIENT)
Dept: SURGERY | Facility: HOSPITAL | Age: 74
End: 2019-05-07
Payer: MEDICARE

## 2019-05-07 RX ORDER — NALOXONE HYDROCHLORIDE 0.4 MG/ML
.1-.4 INJECTION, SOLUTION INTRAMUSCULAR; INTRAVENOUS; SUBCUTANEOUS
Status: CANCELLED | OUTPATIENT
Start: 2019-05-07 | End: 2019-05-08

## 2019-05-07 RX ORDER — HYDROMORPHONE HYDROCHLORIDE 1 MG/ML
.3-.5 INJECTION, SOLUTION INTRAMUSCULAR; INTRAVENOUS; SUBCUTANEOUS EVERY 10 MIN PRN
Status: CANCELLED | OUTPATIENT
Start: 2019-05-07

## 2019-05-07 RX ORDER — ALBUTEROL SULFATE 0.83 MG/ML
2.5 SOLUTION RESPIRATORY (INHALATION) EVERY 4 HOURS PRN
Status: CANCELLED | OUTPATIENT
Start: 2019-05-07

## 2019-05-07 RX ORDER — ONDANSETRON 2 MG/ML
4 INJECTION INTRAMUSCULAR; INTRAVENOUS EVERY 30 MIN PRN
Status: CANCELLED | OUTPATIENT
Start: 2019-05-07

## 2019-05-07 RX ORDER — ONDANSETRON 4 MG/1
4 TABLET, ORALLY DISINTEGRATING ORAL EVERY 30 MIN PRN
Status: CANCELLED | OUTPATIENT
Start: 2019-05-07

## 2019-05-07 RX ORDER — HYDRALAZINE HYDROCHLORIDE 20 MG/ML
2.5-5 INJECTION INTRAMUSCULAR; INTRAVENOUS EVERY 10 MIN PRN
Status: CANCELLED | OUTPATIENT
Start: 2019-05-07

## 2019-05-07 RX ORDER — MEPERIDINE HYDROCHLORIDE 50 MG/ML
12.5 INJECTION INTRAMUSCULAR; INTRAVENOUS; SUBCUTANEOUS
Status: CANCELLED | OUTPATIENT
Start: 2019-05-07

## 2019-05-07 RX ORDER — SODIUM CHLORIDE, SODIUM LACTATE, POTASSIUM CHLORIDE, CALCIUM CHLORIDE 600; 310; 30; 20 MG/100ML; MG/100ML; MG/100ML; MG/100ML
INJECTION, SOLUTION INTRAVENOUS CONTINUOUS
Status: CANCELLED | OUTPATIENT
Start: 2019-05-07

## 2019-05-07 RX ORDER — FENTANYL CITRATE 50 UG/ML
25-50 INJECTION, SOLUTION INTRAMUSCULAR; INTRAVENOUS
Status: CANCELLED | OUTPATIENT
Start: 2019-05-07

## 2019-05-07 ASSESSMENT — LIFESTYLE VARIABLES: TOBACCO_USE: 1

## 2019-05-07 NOTE — PROGRESS NOTES
"[facility] Surgery Consultation    CC:  Dysphagia     HPI:  This 74 year old year old male is seen at the request of Dr. Marcus for evaluation of dysphagia. He reports years of difficulty with swallowing solids, most specifically meat. Feels like things take a while to make their way down into stomach. There is discomfort associated with this. He is very chatty discussing multiple other topics. He denies any previous abdominal surgery, former smoker.     Past Medical History:   Diagnosis Date     BPH (benign prostatic hyperplasia)      Poor dentition        Past Surgical History:   Procedure Laterality Date     Bilateral cataract       HERNIA REPAIR Right ?     Right knee meniscectomy Right        Allergies   Allergen Reactions     Nickel      Possible. Cheap metal.     Penicillins      Seasonal Allergies      Perfumes       Current Outpatient Medications   Medication     Acetaminophen (TYLENOL PO)     carbamide peroxide (DEBROX) 6.5 % otic solution     Docusate Sodium (COLACE PO)     levothyroxine (SYNTHROID/LEVOTHROID) 50 MCG tablet     pantoprazole (PROTONIX) 40 MG EC tablet     QUEtiapine (SEROQUEL) 300 MG tablet     simvastatin (ZOCOR) 40 MG tablet     VITAMIN D3 1000 units tablet     No current facility-administered medications for this visit.        HABITS:    Social History     Tobacco Use     Smoking status: Former Smoker     Packs/day: 0.50     Types: Cigarettes     Last attempt to quit: 1993     Years since quittin.9     Smokeless tobacco: Never Used   Substance Use Topics     Alcohol use: No     Drug use: None       No family history on file.    REVIEW OF SYSTEMS:  Ten point review of systems negative except those mentioned in the HPI.     OBJECTIVE:    /68 (BP Location: Right arm, Patient Position: Sitting, Cuff Size: Adult Regular)   Pulse 68   Temp 96.9  F (36.1  C) (Tympanic)   Ht 1.803 m (5' 11\")   Wt 74.8 kg (165 lb)   SpO2 96%   BMI 23.01 kg/m      GENERAL: Generally " appears well, in no distress with appropriate affect.  HEENT:   Sclerae anicteric - normocephalic atraumatic   no exam performed        IMPRESSION:  74 y.o. Male with history of dysphagia/odynophagia presents to clinic. I reviewed his esophagram without significant issue noted on images. Would like to perform EGD with biopsies as his history may be consistent with eosinophilic esophagitis. He has agreed to proceed.     PLAN:    Upper endoscopy     30 minutes spent with patient, over 50% of time was counseling the patient on treatment options, natural history of disease process, answering all questions.       Silvio Guzmán MD,     5/7/2019  8:28 AM

## 2019-05-07 NOTE — ANESTHESIA PREPROCEDURE EVALUATION
Anesthesia Pre-Procedure Evaluation    Patient: Christiano Leon   MRN: [id number] : 1945          Preoperative Diagnosis: ESOPHAGEAL DYSPHAGIA    Procedure(s):  UPPER ENDOSCOPY POSSIBLE BIOPSY    Past Medical History:   Diagnosis Date     BPH (benign prostatic hyperplasia)      Poor dentition      Past Surgical History:   Procedure Laterality Date     Bilateral cataract       HERNIA REPAIR Right 2007?     Right knee meniscectomy Right        Anesthesia Evaluation     . Pt has had prior anesthetic.     No history of anesthetic complications          ROS/MED HX    ENT/Pulmonary:     (+)allergic rhinitis, tobacco use, Past use , . .    Neurologic:  - neg neurologic ROS     Cardiovascular:  - neg cardiovascular ROS       METS/Exercise Tolerance:     Hematologic:  - neg hematologic  ROS       Musculoskeletal:   (+) arthritis,  -       GI/Hepatic: Comment: dysphagia    (+) GERD       Renal/Genitourinary:     (+) BPH,       Endo:     (+) thyroid problem hypothyroidism, .      Psychiatric:  - neg psychiatric ROS       Infectious Disease:  - neg infectious disease ROS       Malignancy:      - no malignancy   Other:    - neg other ROS                      Physical Exam  Normal systems: cardiovascular and pulmonary    Airway   Mallampati: III  TM distance: >3 FB  Neck ROM: full    Dental   (+) upper dentures, lower dentures, partials and missing    Cardiovascular   Rhythm and rate: regular and normal      Pulmonary    breath sounds clear to auscultation            Lab Results   Component Value Date    WBC 5.5 2019    HGB 13.7 2019    HCT 41.5 2019     2019    CRP 4.4 2018    SED 11 2018     2018    POTASSIUM 4.1 2018    CHLORIDE 106 2018    CO2 30 2018    BUN 18 2018    CR 1.17 2018    GLC 98 2018    GAYLE 8.5 2018    ALBUMIN 3.4 2018    PROTTOTAL 6.8 2018    ALT 14 2018    AST 9 2019    ALKPHOS 82  "09/11/2018    BILITOTAL 0.3 09/11/2018    TSH 1.82 04/09/2019       Preop Vitals  BP Readings from Last 3 Encounters:   05/06/19 118/68   04/30/19 136/80   04/09/19 120/70    Pulse Readings from Last 3 Encounters:   05/06/19 68   04/30/19 75   04/09/19 74      Resp Readings from Last 3 Encounters:   04/30/19 16   04/05/19 18   01/02/18 18    SpO2 Readings from Last 3 Encounters:   05/06/19 96%   04/30/19 96%   04/09/19 98%      Temp Readings from Last 1 Encounters:   05/06/19 96.9  F (36.1  C) (Tympanic)    Ht Readings from Last 1 Encounters:   05/06/19 1.803 m (5' 11\")      Wt Readings from Last 1 Encounters:   05/06/19 74.8 kg (165 lb)    Estimated body mass index is 23.01 kg/m  as calculated from the following:    Height as of 5/6/19: 1.803 m (5' 11\").    Weight as of 5/6/19: 74.8 kg (165 lb).       Anesthesia Plan      History & Physical Review  History and physical reviewed and following examination; no interval change.    ASA Status:  3 .    NPO Status:  > 8 hours    Plan for MAC with Intravenous and Propofol induction. Maintenance will be TIVA.  Reason for MAC:  Procedure to face, neck, head or breast and Other - see comments  PONV prophylaxis:  Ondansetron (or other 5HT-3)  HP 5-7-19  Surgeon requests deep sedation. Patient has advanced age >70 and a Mallampati 3 airway. Will provide MAC.      Postoperative Care  Postoperative pain management:  IV analgesics.      Consents  Anesthetic plan, risks, benefits and alternatives discussed with:  Patient..                 ZANE Garrison CRNA  "

## 2019-05-10 ENCOUNTER — HOSPITAL ENCOUNTER (OUTPATIENT)
Facility: HOSPITAL | Age: 74
Discharge: HOME | End: 2019-05-10
Attending: SURGERY | Admitting: SURGERY
Payer: MEDICARE

## 2019-05-10 ENCOUNTER — ANESTHESIA (OUTPATIENT)
Dept: SURGERY | Facility: HOSPITAL | Age: 74
End: 2019-05-10
Payer: MEDICARE

## 2019-05-10 VITALS
TEMPERATURE: 97.9 F | WEIGHT: 170 LBS | HEIGHT: 71 IN | DIASTOLIC BLOOD PRESSURE: 86 MMHG | HEART RATE: 64 BPM | RESPIRATION RATE: 14 BRPM | OXYGEN SATURATION: 96 % | SYSTOLIC BLOOD PRESSURE: 152 MMHG | BODY MASS INDEX: 23.8 KG/M2

## 2019-05-10 PROCEDURE — 25800030 ZZH RX IP 258 OP 636: Performed by: NURSE ANESTHETIST, CERTIFIED REGISTERED

## 2019-05-10 PROCEDURE — 25000128 H RX IP 250 OP 636: Performed by: NURSE ANESTHETIST, CERTIFIED REGISTERED

## 2019-05-10 PROCEDURE — 25000125 ZZHC RX 250: Performed by: NURSE ANESTHETIST, CERTIFIED REGISTERED

## 2019-05-10 PROCEDURE — 99100 ANES PT EXTEME AGE<1 YR&>70: CPT | Performed by: NURSE ANESTHETIST, CERTIFIED REGISTERED

## 2019-05-10 PROCEDURE — 43235 EGD DIAGNOSTIC BRUSH WASH: CPT | Performed by: ANESTHESIOLOGY

## 2019-05-10 PROCEDURE — 25000125 ZZHC RX 250: Performed by: SURGERY

## 2019-05-10 PROCEDURE — 37000008 ZZH ANESTHESIA TECHNICAL FEE, 1ST 30 MIN: Performed by: SURGERY

## 2019-05-10 PROCEDURE — 40000306 ZZH STATISTIC PRE PROC ASSESS II: Performed by: SURGERY

## 2019-05-10 PROCEDURE — 43239 EGD BIOPSY SINGLE/MULTIPLE: CPT | Performed by: SURGERY

## 2019-05-10 PROCEDURE — 71000027 ZZH RECOVERY PHASE 2 EACH 15 MINS: Performed by: SURGERY

## 2019-05-10 PROCEDURE — 43235 EGD DIAGNOSTIC BRUSH WASH: CPT | Performed by: NURSE ANESTHETIST, CERTIFIED REGISTERED

## 2019-05-10 PROCEDURE — 27210794 ZZH OR GENERAL SUPPLY STERILE: Performed by: SURGERY

## 2019-05-10 PROCEDURE — 88305 TISSUE EXAM BY PATHOLOGIST: CPT | Mod: TC | Performed by: SURGERY

## 2019-05-10 PROCEDURE — 36000050 ZZH SURGERY LEVEL 2 1ST 30 MIN: Performed by: SURGERY

## 2019-05-10 RX ORDER — PROPOFOL 10 MG/ML
INJECTION, EMULSION INTRAVENOUS PRN
Status: DISCONTINUED | OUTPATIENT
Start: 2019-05-10 | End: 2019-05-10

## 2019-05-10 RX ORDER — LIDOCAINE 40 MG/G
CREAM TOPICAL
Status: DISCONTINUED | OUTPATIENT
Start: 2019-05-10 | End: 2019-05-10 | Stop reason: HOSPADM

## 2019-05-10 RX ORDER — LIDOCAINE HYDROCHLORIDE 20 MG/ML
INJECTION, SOLUTION INFILTRATION; PERINEURAL PRN
Status: DISCONTINUED | OUTPATIENT
Start: 2019-05-10 | End: 2019-05-10

## 2019-05-10 RX ORDER — SODIUM CHLORIDE, SODIUM LACTATE, POTASSIUM CHLORIDE, CALCIUM CHLORIDE 600; 310; 30; 20 MG/100ML; MG/100ML; MG/100ML; MG/100ML
INJECTION, SOLUTION INTRAVENOUS CONTINUOUS
Status: DISCONTINUED | OUTPATIENT
Start: 2019-05-10 | End: 2019-05-10 | Stop reason: HOSPADM

## 2019-05-10 RX ADMIN — SODIUM CHLORIDE, POTASSIUM CHLORIDE, SODIUM LACTATE AND CALCIUM CHLORIDE: 600; 310; 30; 20 INJECTION, SOLUTION INTRAVENOUS at 10:00

## 2019-05-10 RX ADMIN — LIDOCAINE HYDROCHLORIDE 40 MG: 20 INJECTION, SOLUTION INFILTRATION; PERINEURAL at 10:56

## 2019-05-10 RX ADMIN — PROPOFOL 20 MG: 10 INJECTION, EMULSION INTRAVENOUS at 10:56

## 2019-05-10 RX ADMIN — PROPOFOL 20 MG: 10 INJECTION, EMULSION INTRAVENOUS at 11:00

## 2019-05-10 RX ADMIN — PROPOFOL 10 MG: 10 INJECTION, EMULSION INTRAVENOUS at 11:08

## 2019-05-10 RX ADMIN — PROPOFOL 20 MG: 10 INJECTION, EMULSION INTRAVENOUS at 11:01

## 2019-05-10 RX ADMIN — PROPOFOL 10 MG: 10 INJECTION, EMULSION INTRAVENOUS at 11:03

## 2019-05-10 RX ADMIN — PROPOFOL 10 MG: 10 INJECTION, EMULSION INTRAVENOUS at 11:05

## 2019-05-10 ASSESSMENT — MIFFLIN-ST. JEOR: SCORE: 1523.24

## 2019-05-10 NOTE — OR NURSING
Patient and responsible adult given discharge instructions with no questions regarding instructions. Davina score 20. Pain level 0/10.  Discharged from unit via amb. Patient discharged to home.

## 2019-05-10 NOTE — ANESTHESIA CARE TRANSFER NOTE
Patient: Christiano Leon    Procedure(s):  UPPER ENDOSCOPY WITH BIOPSIES    Diagnosis: ESOPHAGEAL DYSPHAGIA  Diagnosis Additional Information: No value filed.    Anesthesia Type:   MAC     Note:  Airway :Nasal Cannula  Patient transferred to:Phase II  Handoff Report: Identifed the Patient, Identified the Reponsible Provider, Reviewed the pertinent medical history, Discussed the surgical course, Reviewed Intra-OP anesthesia mangement and issues during anesthesia, Set expectations for post-procedure period and Allowed opportunity for questions and acknowledgement of understanding      Vitals: (Last set prior to Anesthesia Care Transfer)    CRNA VITALS  5/10/2019 1041 - 5/10/2019 1114      5/10/2019             Pulse:  52    Ht Rate:  50    SpO2:  95 %    Resp Rate (set):  8                Electronically Signed By: ZANE Austin CRNA  May 10, 2019  11:14 AM

## 2019-05-10 NOTE — BRIEF OP NOTE
[facility]    Brief Operative Note    Pre-operative diagnosis: ESOPHAGEAL DYSPHAGIA  Post-operative diagnosis Hiatal hernia, alvarenga's esophagus   Procedure: Procedure(s):  UPPER ENDOSCOPY WITH BIOPSIES  Surgeon: Surgeon(s) and Role:     * Silvio Guzmán MD - Primary  Anesthesia: Monitor Anesthesia Care   Estimated blood loss: Less than 10 ml  Drains: None  Specimens:   ID Type Source Tests Collected by Time Destination   A :  Biopsy Stomach, Antrum SURGICAL PATHOLOGY EXAM Silvio Guzmán MD 5/10/2019 11:03 AM    B :  Biopsy Gastro Esophageal Junction SURGICAL PATHOLOGY EXAM Silvio Guzmán MD 5/10/2019 11:07 AM    C : random esophageal biopsies Biopsy Esophagus SURGICAL PATHOLOGY EXAM Silvio Guzmán MD 5/10/2019 11:08 AM      Findings:   See diagnosis above .  Complications: None.  Implants:  * No implants in log *

## 2019-05-10 NOTE — OR NURSING
Waiting for healthline to pick them up   Went over discharge instructions with Carol from [facility]

## 2019-05-10 NOTE — DISCHARGE INSTRUCTIONS
UPPER ENDOSCOPY    AFTER THE PROCEDURE    You will return to Same Day Surgery to rest for about an hour before you go home.    The doctor will talk with you and your family.    A family member/friend may visit with you.    You may burp up any air remaining in your stomach.    You may feel dizzy or light-headed from the medicine.    Your nurse will go over the discharge instructions with you and your caregiver and answer any of your questions.      You will be contacted the next day to check on how you are doing.    If biopsies were taken, you will be contacted with the results usually within 3 days.  BACK AT HOME    Rest for an hour or two after you get home.    When your throat is no longer numb and you have a gag reflex, take a few sips of cool water.  If you can swallow comfortably, you may start eating again.    You may have a mild sore throat for the rest of the day.    You will be contacted with results by the surgeons office within a week. If not contacted in one week, call the surgeons office.  WHAT TO WATCH FOR:  Problems rarely occur after the exam, but it is important to be aware of the early signs of a complication.  Call your doctor immediately if you have:    Difficulty swallowing or breathing    Unusual pain in your stomach or chest    Vomiting blood or dark material that looks like coffee grounds    Black or tarry stools    Temperature over 101.5 degrees    MORE QUESTIONS?  Please ask your doctor or nurse before the exam begins  or call your doctor at the clinic.    IF YOU MUST CANCEL YOUR PROCEDURE THE EVENING/NIGHT BEFORE, PLEASE CALL HOSPITAL ADMITTING -[phone] OR TOLL FREE [phone], [phone].    Phone Numbers:  [facility] - [phone] [phone]  [facility] - [phone]  Surgery Patient Education - [phone] or toll free [phone]    Post-Anesthesia Patient Instructions    IMMEDIATELY FOLLOWING SURGERY:  Do not drive or operate machinery for the first twenty  four hours after surgery.  Do not make any important decisions for twenty four hours after surgery or while taking narcotic pain medications or sedatives.  If you develop intractable nausea and vomiting or a severe headache please notify your doctor immediately.    FOLLOW-UP:  Please make an appointment with your surgeon as instructed. You do not need to follow up with anesthesia unless specifically instructed to do so.    WOUND CARE INSTRUCTIONS (if applicable):  Keep a dry clean dressing on the anesthesia/puncture wound site if there is drainage.  Once the wound has quit draining you may leave it open to air.  Generally you should leave the bandage intact for twenty four hours unless there is drainage.  If the epidural site drains for more than 36-48 hours please call the anesthesia department.    QUESTIONS?:  Please feel free to call your physician or the hospital  if you have any questions, and they will be happy to assist you.

## 2019-05-10 NOTE — OP NOTE
Christiano Leon MRN# [id number]   YOB: 1945 Age: 74 year old      Date of Admission:  5/10/2019    Primary care provider: ROBERT Akbar    PREOPERATIVE DIAGNOSIS:  Dysphagia       POSTOPERATIVE DIAGNOSES:    Hiatal hernia, likely alvarenga's esophagus       PROCEDURE:  Esophagogastroduodenoscopy with cold forceps biopsies.       HISTORY OF PRESENT ILLNESS:  See clinic note      OPERATIVE FINDINGS:  There was a moderate size hiatal hernia, with fingers of mucosal changes which are likely consistent with alvarenga's esophagus. There was no masses or strictures.     Specimen(s) submitted to pathology:  Antral biopsies, GE junction biopsies, random esophageal biopsies.     PROCEDURE:  With the patient in the supine position on the transport cart, IV sedation was administered by the nurse anesthetist.  Her correct identity and planned procedure were confirmed during a requisite timeout pause.  A bite block was placed and the fiberoptic endoscope was introduced and negotiated through the cricopharyngeus without difficulty.  The length of the esophagus was examined without findings.  The gastroesophageal junction was noted 35 cm from the incisors; the Z line was irregular without ulceration, bleeding source or stricture.  There was  evidence of Alvarenga's change.  The stomach was entered and the pylorus was traversed easily.  The gastric mucosa was normal; there was no evidence of gastric polyp, ulcer or bleeding source.  The duodenum was similarly without finding.  The endoscope was returned to the body of the stomach and retroflex confirmed the absence of abnormality in the cardia.      Random cold forceps biopsies were obtained of the antrum for H. pylori.  Hemostasis was judged adequate on visualization.   The endoscope was returned to the GE junction.  Circumferential biopsies were obtained; hemostasis was satisfactory.  A second circumferential examination of the esophagus was performed on slow withdrawal of  the endoscope without additional finding. There were random biopsies taken in the esophagus for EE.  The procedure was satisfactorially tolerated; there was no appreciable blood loss and the patient was returned to Day Surgery in good condition.      Silvio Guzmán    5/10/2019  2:31 PM

## 2019-05-13 LAB — COPATH REPORT: NORMAL

## 2019-05-13 NOTE — ANESTHESIA POSTPROCEDURE EVALUATION
Patient: Christiano Leon    Procedure(s):  UPPER ENDOSCOPY WITH BIOPSIES    Diagnosis:ESOPHAGEAL DYSPHAGIA  Diagnosis Additional Information: No value filed.    Anesthesia Type:  MAC    Note:  Anesthesia Post Evaluation    Patient location during evaluation: Phase 2  Patient participation: Able to fully participate in evaluation  Level of consciousness: awake and alert  Pain management: adequate  Airway patency: patent  Cardiovascular status: acceptable  Respiratory status: acceptable  Hydration status: acceptable  PONV: none             Last vitals:  Vitals:    05/10/19 1155 05/10/19 1200 05/10/19 1205   BP: 143/88 145/86 152/86   Pulse:      Resp:   14   Temp:      SpO2: 97% 96% 96%         Electronically Signed By: ZANE Garrison CRNA  May 13, 2019  10:23 AM

## 2019-05-14 DIAGNOSIS — E55.9 VITAMIN D DEFICIENCY: ICD-10-CM

## 2019-05-14 RX ORDER — CHOLECALCIFEROL (VITAMIN D3) 25 MCG
TABLET ORAL
Qty: 100 TABLET | Refills: 3 | Status: SHIPPED | OUTPATIENT
Start: 2019-05-14 | End: 2019-09-16

## 2019-05-21 DIAGNOSIS — E78.5 HYPERLIPIDEMIA LDL GOAL <100: ICD-10-CM

## 2019-05-22 RX ORDER — SIMVASTATIN 40 MG
TABLET ORAL
Qty: 90 TABLET | Refills: 0 | Status: SHIPPED | OUTPATIENT
Start: 2019-05-22 | End: 2019-08-16

## 2019-06-11 DIAGNOSIS — K59.00 CONSTIPATION, UNSPECIFIED CONSTIPATION TYPE: ICD-10-CM

## 2019-06-12 RX ORDER — DOCUSATE SODIUM 100 MG/1
CAPSULE, LIQUID FILLED ORAL
Qty: 120 CAPSULE | Refills: 1 | Status: SHIPPED | OUTPATIENT
Start: 2019-06-12 | End: 2019-09-16

## 2019-06-12 NOTE — TELEPHONE ENCOUNTER
MG    Next 5 appointments (look out 90 days)    Jun 28, 2019  1:00 PM CDT  (Arrive by 12:45 PM)  SHORT with R Dustin Akbar MD  [facility] ([facility] ) [address]  [place] [address]  [phone]           Discontinued  MG capsule on 4-30-19 by LPN. Colace is patient reported reported. PCP Raffy out. Please advise. Thank you.

## 2019-06-28 ENCOUNTER — OFFICE VISIT (OUTPATIENT)
Dept: FAMILY MEDICINE | Facility: OTHER | Age: 74
End: 2019-06-28
Attending: FAMILY MEDICINE
Payer: MEDICARE

## 2019-06-28 ENCOUNTER — ANCILLARY PROCEDURE (OUTPATIENT)
Dept: GENERAL RADIOLOGY | Facility: OTHER | Age: 74
End: 2019-06-28
Attending: FAMILY MEDICINE
Payer: MEDICARE

## 2019-06-28 VITALS
HEART RATE: 79 BPM | WEIGHT: 169 LBS | HEIGHT: 71 IN | BODY MASS INDEX: 23.66 KG/M2 | SYSTOLIC BLOOD PRESSURE: 122 MMHG | TEMPERATURE: 96.6 F | OXYGEN SATURATION: 95 % | DIASTOLIC BLOOD PRESSURE: 76 MMHG

## 2019-06-28 DIAGNOSIS — M25.561 CHRONIC PAIN OF RIGHT KNEE: Primary | ICD-10-CM

## 2019-06-28 DIAGNOSIS — J20.9 ACUTE BRONCHITIS, UNSPECIFIED ORGANISM: ICD-10-CM

## 2019-06-28 DIAGNOSIS — G89.29 CHRONIC PAIN OF RIGHT KNEE: Primary | ICD-10-CM

## 2019-06-28 DIAGNOSIS — R13.19 ESOPHAGEAL DYSPHAGIA: ICD-10-CM

## 2019-06-28 DIAGNOSIS — G89.29 CHRONIC PAIN OF RIGHT KNEE: ICD-10-CM

## 2019-06-28 DIAGNOSIS — M25.561 CHRONIC PAIN OF RIGHT KNEE: ICD-10-CM

## 2019-06-28 PROCEDURE — 99214 OFFICE O/P EST MOD 30 MIN: CPT | Performed by: FAMILY MEDICINE

## 2019-06-28 PROCEDURE — G0463 HOSPITAL OUTPT CLINIC VISIT: HCPCS

## 2019-06-28 PROCEDURE — 73562 X-RAY EXAM OF KNEE 3: CPT | Mod: TC,RT

## 2019-06-28 RX ORDER — AZITHROMYCIN 250 MG/1
TABLET, FILM COATED ORAL
Qty: 6 TABLET | Refills: 0 | Status: SHIPPED | OUTPATIENT
Start: 2019-06-28 | End: 2019-06-28

## 2019-06-28 RX ORDER — DOXYCYCLINE 100 MG/1
100 CAPSULE ORAL 2 TIMES DAILY
COMMUNITY
End: 2021-04-29

## 2019-06-28 ASSESSMENT — MIFFLIN-ST. JEOR: SCORE: 1518.71

## 2019-06-28 NOTE — NURSING NOTE
"Chief Complaint   Patient presents with     Throat Problem     URI     Pain       Initial Blood Pressure 122/76   Pulse 79   Temperature 96.6  F (35.9  C)   Height 1.803 m (5' 11\")   Weight 76.7 kg (169 lb)   Oxygen Saturation 95%   Body Mass Index 23.57 kg/m   Estimated body mass index is 23.57 kg/m  as calculated from the following:    Height as of this encounter: 1.803 m (5' 11\").    Weight as of this encounter: 76.7 kg (169 lb).  Medication Reconciliation: complete  "

## 2019-07-09 ENCOUNTER — OFFICE VISIT (OUTPATIENT)
Dept: ORTHOPEDICS | Facility: OTHER | Age: 74
End: 2019-07-09
Attending: FAMILY MEDICINE
Payer: MEDICARE

## 2019-07-09 VITALS
OXYGEN SATURATION: 96 % | DIASTOLIC BLOOD PRESSURE: 80 MMHG | BODY MASS INDEX: 23.66 KG/M2 | SYSTOLIC BLOOD PRESSURE: 120 MMHG | HEIGHT: 71 IN | WEIGHT: 169 LBS | HEART RATE: 67 BPM

## 2019-07-09 DIAGNOSIS — G89.29 CHRONIC PAIN OF RIGHT KNEE: ICD-10-CM

## 2019-07-09 DIAGNOSIS — M25.511 CHRONIC RIGHT SHOULDER PAIN: Primary | ICD-10-CM

## 2019-07-09 DIAGNOSIS — G89.29 CHRONIC RIGHT SHOULDER PAIN: Primary | ICD-10-CM

## 2019-07-09 DIAGNOSIS — M17.11 PRIMARY OSTEOARTHRITIS OF RIGHT KNEE: ICD-10-CM

## 2019-07-09 DIAGNOSIS — M25.561 CHRONIC PAIN OF RIGHT KNEE: ICD-10-CM

## 2019-07-09 PROCEDURE — 20610 DRAIN/INJ JOINT/BURSA W/O US: CPT | Mod: RT

## 2019-07-09 PROCEDURE — 99213 OFFICE O/P EST LOW 20 MIN: CPT | Mod: 25 | Performed by: ORTHOPAEDIC SURGERY

## 2019-07-09 PROCEDURE — G0463 HOSPITAL OUTPT CLINIC VISIT: HCPCS

## 2019-07-09 PROCEDURE — G0463 HOSPITAL OUTPT CLINIC VISIT: HCPCS | Mod: 25

## 2019-07-09 PROCEDURE — 20610 DRAIN/INJ JOINT/BURSA W/O US: CPT | Mod: RT | Performed by: ORTHOPAEDIC SURGERY

## 2019-07-09 RX ORDER — DEXAMETHASONE SODIUM PHOSPHATE 4 MG/ML
4 INJECTION, SOLUTION INTRA-ARTICULAR; INTRALESIONAL; INTRAMUSCULAR; INTRAVENOUS; SOFT TISSUE ONCE
Status: COMPLETED | OUTPATIENT
Start: 2019-07-09 | End: 2019-07-09

## 2019-07-09 RX ADMIN — DEXAMETHASONE SODIUM PHOSPHATE 4 MG: 4 INJECTION, SOLUTION INTRAMUSCULAR; INTRAVENOUS at 15:13

## 2019-07-09 RX ADMIN — Medication 48 MG: at 15:14

## 2019-07-09 ASSESSMENT — PAIN SCALES - GENERAL: PAINLEVEL: EXTREME PAIN (9)

## 2019-07-09 ASSESSMENT — MIFFLIN-ST. JEOR: SCORE: 1518.71

## 2019-07-09 NOTE — NURSING NOTE
"Chief Complaint   Patient presents with     Musculoskeletal Problem     chronic right knee pain       Initial /80 (BP Location: Left arm, Cuff Size: Adult Regular)   Pulse 67   Ht 1.803 m (5' 11\")   Wt 76.7 kg (169 lb)   SpO2 96%   BMI 23.57 kg/m   Estimated body mass index is 23.57 kg/m  as calculated from the following:    Height as of this encounter: 1.803 m (5' 11\").    Weight as of this encounter: 76.7 kg (169 lb).  Medication Reconciliation: complete  "

## 2019-07-09 NOTE — PROGRESS NOTES
Patient is a 74-year-old male with a chief complaint of stiffness and pain in his right shoulder and stiffness and pain in his right knee.  He has an established diagnosis of osteoarthritis and right shoulder as well as rotator cuff weakness and stiffness, he has an established diagnosis of osteoarthritis in his right knee to take anti-inflammatory medications because of frequent bouts of gastritis and esophagitis.  Tylenol is not effective, he is never had an injection.  He is presently undergoing physical therapy for his right shoulder with some improvement, the orders are due for renewal.    Physical exam of the right knee demonstrates a slight effusion, there is marked tenderness over the lateral joint line, there is no mechanical crepitation or instability.  The skin is intact.  Exam of the right shoulder demonstrates near full range of motion with stiffness at the extremes of forward flexion and abduction above 90 degrees, he is tenderness over the acromioclavicular joint, tenderness over the lateral aspect of the humerus, he has slight weakness to external rotation, abduction and forward flexion.    Assessment and plan: I believe the patient would benefit from Synvisc injection in his right knee as he is a poor candidate for anti-inflammatory medications and is intolerant and even potentially dangerous for him to take them.  We will renew his physical therapy for the shoulder as well.  Patient can follow-up on an as-needed basis.    Procedure note: Synvisc injection in the right knee.  Patient was counseled regarding the nature of the procedure as well as potential benefits and risks.  His consent was obtained.  The right knee was prepped with alcohol, a small wheal of local anesthetic, 1% Xylocaine was placed over the medial aspect of the knee.  Once it had taken its effect, the knee was injected with a mixture of 4 mg of dexamethasone and 6 mL of Synvisc 1.  There were no adverse reactions.  Patient was  instructed in aftercare and will follow-up on an as-needed basis or PRN any application or adverse reaction.

## 2019-08-07 ENCOUNTER — TELEPHONE (OUTPATIENT)
Dept: FAMILY MEDICINE | Facility: OTHER | Age: 74
End: 2019-08-07

## 2019-08-07 NOTE — TELEPHONE ENCOUNTER
1:13 PM    Reason for Call: OVERBOOK    Patient is having the following symptoms: wants to take a look at his knee  for 1 weeks.    The patient is requesting an appointment for ASAP with Dr.Jon pires.    Was an appointment offered for this call? Yes  If yes : Appointment type short               Date 09/06/19 does not want to wait this long     Preferred method for responding to this message: Telephone Call  What is your phone number ?[phone]    If we cannot reach you directly, may we leave a detailed response at the number you provided? Yes    Can this message wait until your PCP/provider returns, if unavailable today? Yes pcp is out     Kisha Lord

## 2019-08-08 NOTE — TELEPHONE ENCOUNTER
Patient scheduled 8/16/19 to register at 3:15. [facility] staff are aware of appointment date and time.
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
## 2019-08-16 ENCOUNTER — OFFICE VISIT (OUTPATIENT)
Dept: FAMILY MEDICINE | Facility: OTHER | Age: 74
End: 2019-08-16
Attending: FAMILY MEDICINE
Payer: MEDICARE

## 2019-08-16 ENCOUNTER — TELEPHONE (OUTPATIENT)
Dept: FAMILY MEDICINE | Facility: OTHER | Age: 74
End: 2019-08-16

## 2019-08-16 ENCOUNTER — ANCILLARY PROCEDURE (OUTPATIENT)
Dept: GENERAL RADIOLOGY | Facility: OTHER | Age: 74
End: 2019-08-16
Attending: FAMILY MEDICINE
Payer: MEDICARE

## 2019-08-16 VITALS
BODY MASS INDEX: 23.68 KG/M2 | SYSTOLIC BLOOD PRESSURE: 124 MMHG | HEART RATE: 77 BPM | TEMPERATURE: 96.8 F | WEIGHT: 169.8 LBS | OXYGEN SATURATION: 77 % | DIASTOLIC BLOOD PRESSURE: 72 MMHG

## 2019-08-16 DIAGNOSIS — M25.561 CHRONIC PAIN OF RIGHT KNEE: Primary | ICD-10-CM

## 2019-08-16 DIAGNOSIS — M25.511 CHRONIC RIGHT SHOULDER PAIN: ICD-10-CM

## 2019-08-16 DIAGNOSIS — E78.5 HYPERLIPIDEMIA LDL GOAL <100: ICD-10-CM

## 2019-08-16 DIAGNOSIS — G89.29 CHRONIC RIGHT SHOULDER PAIN: ICD-10-CM

## 2019-08-16 DIAGNOSIS — G89.29 CHRONIC PAIN OF RIGHT KNEE: Primary | ICD-10-CM

## 2019-08-16 DIAGNOSIS — G89.29 CHRONIC PAIN OF RIGHT KNEE: ICD-10-CM

## 2019-08-16 DIAGNOSIS — M25.561 CHRONIC PAIN OF RIGHT KNEE: ICD-10-CM

## 2019-08-16 DIAGNOSIS — E03.9 HYPOTHYROIDISM, UNSPECIFIED TYPE: ICD-10-CM

## 2019-08-16 PROCEDURE — 73562 X-RAY EXAM OF KNEE 3: CPT | Mod: TC,RT

## 2019-08-16 PROCEDURE — G0463 HOSPITAL OUTPT CLINIC VISIT: HCPCS

## 2019-08-16 PROCEDURE — 99213 OFFICE O/P EST LOW 20 MIN: CPT | Performed by: FAMILY MEDICINE

## 2019-08-16 ASSESSMENT — PAIN SCALES - GENERAL: PAINLEVEL: SEVERE PAIN (6)

## 2019-08-16 NOTE — TELEPHONE ENCOUNTER
Spoke with patient about lab results agrees to go to ortho. Referral pending.     Amee Gomez LPN

## 2019-08-19 DIAGNOSIS — K21.00 GASTROESOPHAGEAL REFLUX DISEASE WITH ESOPHAGITIS: ICD-10-CM

## 2019-08-19 NOTE — TELEPHONE ENCOUNTER
pantoprazole      Last Written Prescription Date:  04/30/19  Last Fill Quantity: 30,   # refills: 3  Last Office Visit: 08/16/19  Future Office visit:

## 2019-08-20 DIAGNOSIS — K21.00 GASTROESOPHAGEAL REFLUX DISEASE WITH ESOPHAGITIS: ICD-10-CM

## 2019-08-20 DIAGNOSIS — E55.9 VITAMIN D DEFICIENCY: ICD-10-CM

## 2019-08-20 RX ORDER — LEVOTHYROXINE SODIUM 50 UG/1
TABLET ORAL
Qty: 90 TABLET | Refills: 0 | Status: SHIPPED | OUTPATIENT
Start: 2019-08-20 | End: 2019-11-11

## 2019-08-20 RX ORDER — SIMVASTATIN 40 MG
TABLET ORAL
Qty: 90 TABLET | Refills: 0 | Status: SHIPPED | OUTPATIENT
Start: 2019-08-20 | End: 2019-11-11

## 2019-08-21 RX ORDER — PANTOPRAZOLE SODIUM 40 MG/1
40 TABLET, DELAYED RELEASE ORAL DAILY
Qty: 30 TABLET | Refills: 3 | Status: SHIPPED | OUTPATIENT
Start: 2019-08-21 | End: 2019-12-10

## 2019-08-23 RX ORDER — PANTOPRAZOLE SODIUM 40 MG/1
TABLET, DELAYED RELEASE ORAL
Qty: 19 TABLET | Refills: 0 | OUTPATIENT
Start: 2019-08-23

## 2019-08-23 RX ORDER — MULTIVIT-MIN/IRON/FOLIC ACID/K 18-600-40
CAPSULE ORAL
Qty: 21 TABLET | Refills: 0 | OUTPATIENT
Start: 2019-08-23

## 2019-09-04 ENCOUNTER — HOSPITAL ENCOUNTER (OUTPATIENT)
Dept: PHYSICAL THERAPY | Facility: HOSPITAL | Age: 74
Setting detail: THERAPIES SERIES
End: 2019-09-04
Attending: FAMILY MEDICINE
Payer: MEDICARE

## 2019-09-04 DIAGNOSIS — G89.29 CHRONIC RIGHT SHOULDER PAIN: ICD-10-CM

## 2019-09-04 DIAGNOSIS — G89.29 CHRONIC PAIN OF RIGHT KNEE: ICD-10-CM

## 2019-09-04 DIAGNOSIS — M25.511 CHRONIC RIGHT SHOULDER PAIN: ICD-10-CM

## 2019-09-04 DIAGNOSIS — M25.561 CHRONIC PAIN OF RIGHT KNEE: ICD-10-CM

## 2019-09-04 PROCEDURE — 97110 THERAPEUTIC EXERCISES: CPT | Mod: GP

## 2019-09-04 PROCEDURE — 97161 PT EVAL LOW COMPLEX 20 MIN: CPT | Mod: GP

## 2019-09-04 ASSESSMENT — ACTIVITIES OF DAILY LIVING (ADL)
PAIN: THE SYMPTOM AFFECTS MY ACTIVITY MODERATELY
GO DOWN STAIRS: ACTIVITY IS FAIRLY DIFFICULT
SIT WITH YOUR KNEE BENT: ACTIVITY IS FAIRLY DIFFICULT
WALK: ACTIVITY IS FAIRLY DIFFICULT
LIMPING: THE SYMPTOM AFFECTS MY ACTIVITY MODERATELY
SWELLING: THE SYMPTOM AFFECTS MY ACTIVITY MODERATELY
KNEEL ON THE FRONT OF YOUR KNEE: ACTIVITY IS FAIRLY DIFFICULT
RISE FROM A CHAIR: ACTIVITY IS FAIRLY DIFFICULT
KNEE_ACTIVITY_OF_DAILY_LIVING_SUM: 28
SQUAT: ACTIVITY IS FAIRLY DIFFICULT
GO UP STAIRS: ACTIVITY IS FAIRLY DIFFICULT
GIVING WAY, BUCKLING OR SHIFTING OF KNEE: THE SYMPTOM AFFECTS MY ACTIVITY MODERATELY
AS_A_RESULT_OF_YOUR_KNEE_INJURY,_HOW_WOULD_YOU_RATE_YOUR_CURRENT_LEVEL_OF_DAILY_ACTIVITY?: ABNORMAL
KNEE_ACTIVITY_OF_DAILY_LIVING_SCORE: 40
STAND: ACTIVITY IS FAIRLY DIFFICULT
WEAKNESS: THE SYMPTOM AFFECTS MY ACTIVITY MODERATELY
STIFFNESS: THE SYMPTOM AFFECTS MY ACTIVITY MODERATELY
RAW_SCORE: 28
HOW_WOULD_YOU_RATE_THE_OVERALL_FUNCTION_OF_YOUR_KNEE_DURING_YOUR_USUAL_DAILY_ACTIVITIES?: ABNORMAL
HOW_WOULD_YOU_RATE_THE_CURRENT_FUNCTION_OF_YOUR_KNEE_DURING_YOUR_USUAL_DAILY_ACTIVITIES_ON_A_SCALE_FROM_0_TO_100_WITH_100_BEING_YOUR_LEVEL_OF_KNEE_FUNCTION_PRIOR_TO_YOUR_INJURY_AND_0_BEING_THE_INABILITY_TO_PERFORM_ANY_OF_YOUR_USUAL_DAILY_ACTIVITIES?: 2
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
## 2019-09-11 ENCOUNTER — HOSPITAL ENCOUNTER (OUTPATIENT)
Dept: PHYSICAL THERAPY | Facility: HOSPITAL | Age: 74
Setting detail: THERAPIES SERIES
End: 2019-09-11
Attending: FAMILY MEDICINE
Payer: MEDICARE

## 2019-09-11 PROCEDURE — 97110 THERAPEUTIC EXERCISES: CPT | Mod: GP

## 2019-09-16 ENCOUNTER — HOSPITAL ENCOUNTER (OUTPATIENT)
Dept: PHYSICAL THERAPY | Facility: HOSPITAL | Age: 74
Setting detail: THERAPIES SERIES
End: 2019-09-16
Attending: FAMILY MEDICINE
Payer: MEDICARE

## 2019-09-16 DIAGNOSIS — E55.9 VITAMIN D DEFICIENCY: ICD-10-CM

## 2019-09-16 DIAGNOSIS — F03.91 DEMENTIA WITH BEHAVIORAL DISTURBANCE, UNSPECIFIED DEMENTIA TYPE: ICD-10-CM

## 2019-09-16 DIAGNOSIS — K59.00 CONSTIPATION, UNSPECIFIED CONSTIPATION TYPE: ICD-10-CM

## 2019-09-16 PROCEDURE — 97110 THERAPEUTIC EXERCISES: CPT | Mod: GP

## 2019-09-17 RX ORDER — DOCUSATE SODIUM 100 MG/1
CAPSULE, LIQUID FILLED ORAL
Qty: 168 CAPSULE | Refills: 0 | Status: SHIPPED | OUTPATIENT
Start: 2019-09-17 | End: 2019-10-14

## 2019-09-17 RX ORDER — MULTIVIT-MIN/IRON/FOLIC ACID/K 18-600-40
CAPSULE ORAL
Qty: 42 TABLET | Refills: 0 | Status: SHIPPED | OUTPATIENT
Start: 2019-09-17 | End: 2019-10-14

## 2019-09-17 RX ORDER — QUETIAPINE FUMARATE 300 MG/1
TABLET, FILM COATED ORAL
Qty: 110 TABLET | Refills: 0 | Status: SHIPPED | OUTPATIENT
Start: 2019-09-17 | End: 2019-10-14

## 2019-09-18 ENCOUNTER — HOSPITAL ENCOUNTER (OUTPATIENT)
Dept: PHYSICAL THERAPY | Facility: HOSPITAL | Age: 74
Setting detail: THERAPIES SERIES
End: 2019-09-18
Attending: FAMILY MEDICINE
Payer: MEDICARE

## 2019-09-18 PROCEDURE — 97110 THERAPEUTIC EXERCISES: CPT | Mod: GP

## 2019-09-24 ENCOUNTER — HOSPITAL ENCOUNTER (OUTPATIENT)
Dept: PHYSICAL THERAPY | Facility: HOSPITAL | Age: 74
Setting detail: THERAPIES SERIES
End: 2019-09-24
Attending: FAMILY MEDICINE
Payer: MEDICARE

## 2019-09-24 PROCEDURE — 97110 THERAPEUTIC EXERCISES: CPT | Mod: GP

## 2019-09-26 ENCOUNTER — HOSPITAL ENCOUNTER (OUTPATIENT)
Dept: PHYSICAL THERAPY | Facility: HOSPITAL | Age: 74
Setting detail: THERAPIES SERIES
End: 2019-09-26
Attending: FAMILY MEDICINE
Payer: MEDICARE

## 2019-09-26 PROCEDURE — 97110 THERAPEUTIC EXERCISES: CPT | Mod: GP

## 2019-09-30 ENCOUNTER — HOSPITAL ENCOUNTER (OUTPATIENT)
Dept: PHYSICAL THERAPY | Facility: HOSPITAL | Age: 74
Setting detail: THERAPIES SERIES
End: 2019-09-30
Attending: FAMILY MEDICINE
Payer: MEDICARE

## 2019-09-30 PROCEDURE — 97110 THERAPEUTIC EXERCISES: CPT | Mod: GP

## 2019-10-03 ENCOUNTER — HOSPITAL ENCOUNTER (OUTPATIENT)
Dept: PHYSICAL THERAPY | Facility: HOSPITAL | Age: 74
Setting detail: THERAPIES SERIES
End: 2019-10-03
Attending: FAMILY MEDICINE
Payer: MEDICARE

## 2019-10-03 PROCEDURE — 97110 THERAPEUTIC EXERCISES: CPT | Mod: GP

## 2019-10-08 ENCOUNTER — HOSPITAL ENCOUNTER (OUTPATIENT)
Dept: PHYSICAL THERAPY | Facility: HOSPITAL | Age: 74
Setting detail: THERAPIES SERIES
End: 2019-10-08
Attending: FAMILY MEDICINE
Payer: MEDICARE

## 2019-10-08 PROCEDURE — 97110 THERAPEUTIC EXERCISES: CPT | Mod: GP

## 2019-10-10 ENCOUNTER — HOSPITAL ENCOUNTER (OUTPATIENT)
Dept: PHYSICAL THERAPY | Facility: HOSPITAL | Age: 74
Setting detail: THERAPIES SERIES
End: 2019-10-10
Attending: FAMILY MEDICINE
Payer: MEDICARE

## 2019-10-10 PROCEDURE — 97110 THERAPEUTIC EXERCISES: CPT | Mod: GP

## 2019-10-10 NOTE — PROGRESS NOTES
Outpatient Physical Therapy Progress Note     Patient: Christiano Leon  : 1945    Beginning/End Dates of Reporting Period:  19 to 10/10/2019    Referring Provider: Dr. Akbar    Therapy Diagnosis: chronic R shoulder and chronic R knee pain     Client Self Report: Patient reports today for R shoulder and R knee pain. States that things are coming around nicely. Feels that his shoulder mobility is improved and that he is getting some good benefit from therapy. States that knee hasn't buckled on him lately with daily walking and movement.    Objective Measurements:  Full passive and active R knee mobility  No crepitus with R knee active mobility  140 degrees active R shoulder scaption      Goals:  Short-term goals:  To be achieved in 2-4 weeks:     Instruct in home program  1.) Patient will understand biomechanical stressors of the knee joint in order to make modifications to activities to reduce further risk of injury. MET  2.) Patient will be independent with a short-term home exercise program. MET  3.) Improve R shoulder active range of motion to 135 degrees to allow increased ability to reach up overhead MET        Long-term goals:  To be achieved in 8-10 weeks:     Return to previous level of function NOT MET  Self management of symptoms. NOT MET  1.) Patient will increase bilateral hip abduction strength to 4+/5 bilaterally NOT MET  2.) Patient will be able to negotiate a flight of stairs with reciprocal pattern independently. MET  3.) Improve R shoulder ER strength to 4+/5 to allow increased ability perform daily movements and lifting activities NOT MET     Assessment:  Patient presents to PT with chronic R shoulder and R knee pain. Throughout course of the past 10 therapy sessions, patient has improved overall tolerance to activity, reduced R shoulder pain with active reaching, and improved overall R knee stability and strength. Patient would benefit from continued therapy sessions addressing knee and  shoulder strength, stability, and functional ability with appropriate interventions, therapeutic exercise, and patient education.    Plan:  Continue therapy per current plan of care.    Discharge:  No

## 2019-10-14 DIAGNOSIS — E55.9 VITAMIN D DEFICIENCY: ICD-10-CM

## 2019-10-14 DIAGNOSIS — F03.91 DEMENTIA WITH BEHAVIORAL DISTURBANCE, UNSPECIFIED DEMENTIA TYPE: ICD-10-CM

## 2019-10-14 DIAGNOSIS — K59.00 CONSTIPATION, UNSPECIFIED CONSTIPATION TYPE: ICD-10-CM

## 2019-10-15 ENCOUNTER — HOSPITAL ENCOUNTER (OUTPATIENT)
Dept: PHYSICAL THERAPY | Facility: HOSPITAL | Age: 74
Setting detail: THERAPIES SERIES
End: 2019-10-15
Attending: FAMILY MEDICINE
Payer: MEDICARE

## 2019-10-15 PROCEDURE — 97110 THERAPEUTIC EXERCISES: CPT | Mod: GP

## 2019-10-17 ENCOUNTER — HOSPITAL ENCOUNTER (OUTPATIENT)
Dept: PHYSICAL THERAPY | Facility: HOSPITAL | Age: 74
Setting detail: THERAPIES SERIES
End: 2019-10-17
Attending: FAMILY MEDICINE
Payer: MEDICARE

## 2019-10-17 PROCEDURE — 97110 THERAPEUTIC EXERCISES: CPT | Mod: GP

## 2019-10-17 RX ORDER — MULTIVIT-MIN/IRON/FOLIC ACID/K 18-600-40
CAPSULE ORAL
Qty: 28 TABLET | Refills: 3 | Status: SHIPPED | OUTPATIENT
Start: 2019-10-17 | End: 2020-03-03

## 2019-10-17 RX ORDER — DOCUSATE SODIUM 100 MG/1
CAPSULE, LIQUID FILLED ORAL
Qty: 112 CAPSULE | Refills: 3 | Status: SHIPPED | OUTPATIENT
Start: 2019-10-17 | End: 2020-03-03

## 2019-10-17 RX ORDER — QUETIAPINE FUMARATE 300 MG/1
TABLET, FILM COATED ORAL
Qty: 84 TABLET | Refills: 1 | Status: SHIPPED | OUTPATIENT
Start: 2019-10-17 | End: 2019-12-10

## 2019-10-24 ENCOUNTER — HOSPITAL ENCOUNTER (OUTPATIENT)
Dept: PHYSICAL THERAPY | Facility: HOSPITAL | Age: 74
Setting detail: THERAPIES SERIES
End: 2019-10-24
Attending: FAMILY MEDICINE
Payer: MEDICARE

## 2019-10-24 PROCEDURE — 97110 THERAPEUTIC EXERCISES: CPT | Mod: GP

## 2019-10-29 ENCOUNTER — HOSPITAL ENCOUNTER (OUTPATIENT)
Dept: PHYSICAL THERAPY | Facility: HOSPITAL | Age: 74
Setting detail: THERAPIES SERIES
End: 2019-10-29
Attending: FAMILY MEDICINE
Payer: MEDICARE

## 2019-10-29 PROCEDURE — 97110 THERAPEUTIC EXERCISES: CPT | Mod: GP

## 2019-10-31 ENCOUNTER — HOSPITAL ENCOUNTER (OUTPATIENT)
Dept: PHYSICAL THERAPY | Facility: HOSPITAL | Age: 74
Setting detail: THERAPIES SERIES
End: 2019-10-31
Attending: FAMILY MEDICINE
Payer: MEDICARE

## 2019-10-31 PROCEDURE — 97110 THERAPEUTIC EXERCISES: CPT | Mod: GP

## 2019-11-05 ENCOUNTER — HOSPITAL ENCOUNTER (OUTPATIENT)
Dept: PHYSICAL THERAPY | Facility: HOSPITAL | Age: 74
Setting detail: THERAPIES SERIES
End: 2019-11-05
Attending: FAMILY MEDICINE
Payer: MEDICARE

## 2019-11-05 PROCEDURE — 97110 THERAPEUTIC EXERCISES: CPT | Mod: GP

## 2019-11-07 ENCOUNTER — HOSPITAL ENCOUNTER (OUTPATIENT)
Dept: PHYSICAL THERAPY | Facility: HOSPITAL | Age: 74
Setting detail: THERAPIES SERIES
End: 2019-11-07
Attending: FAMILY MEDICINE
Payer: MEDICARE

## 2019-11-07 PROCEDURE — 97110 THERAPEUTIC EXERCISES: CPT | Mod: GP

## 2019-11-11 DIAGNOSIS — E78.5 HYPERLIPIDEMIA LDL GOAL <100: ICD-10-CM

## 2019-11-11 DIAGNOSIS — E03.9 HYPOTHYROIDISM, UNSPECIFIED TYPE: ICD-10-CM

## 2019-11-13 RX ORDER — SIMVASTATIN 40 MG
TABLET ORAL
Qty: 28 TABLET | Refills: 3 | Status: SHIPPED | OUTPATIENT
Start: 2019-11-13 | End: 2020-03-03

## 2019-11-13 RX ORDER — LEVOTHYROXINE SODIUM 50 UG/1
TABLET ORAL
Qty: 28 TABLET | Refills: 3 | Status: SHIPPED | OUTPATIENT
Start: 2019-11-13 | End: 2020-03-03

## 2019-11-14 ENCOUNTER — HOSPITAL ENCOUNTER (OUTPATIENT)
Dept: PHYSICAL THERAPY | Facility: HOSPITAL | Age: 74
Setting detail: THERAPIES SERIES
End: 2019-11-14
Attending: FAMILY MEDICINE
Payer: MEDICARE

## 2019-11-14 PROCEDURE — 97110 THERAPEUTIC EXERCISES: CPT | Mod: GP

## 2019-11-14 ASSESSMENT — ACTIVITIES OF DAILY LIVING (ADL)
STIFFNESS: THE SYMPTOM AFFECTS MY ACTIVITY SLIGHTLY
SWELLING: THE SYMPTOM AFFECTS MY ACTIVITY SEVERELY
GIVING WAY, BUCKLING OR SHIFTING OF KNEE: THE SYMPTOM AFFECTS MY ACTIVITY MODERATELY
PAIN: THE SYMPTOM AFFECTS MY ACTIVITY SLIGHTLY
LIMPING: THE SYMPTOM AFFECTS MY ACTIVITY MODERATELY
WEAKNESS: THE SYMPTOM AFFECTS MY ACTIVITY MODERATELY

## 2019-11-14 NOTE — PROGRESS NOTES
Outpatient Physical Therapy Discharge Note     Patient: Christiano Leon  : 1945    Beginning/End Dates of Reporting Period:  19 to 2019    Referring Provider: Dr. Akbar    Therapy Diagnosis: chronic R shoulder and R knee pain     Client Self Report: Patient reports today for R shoulder and R knee pain. States that things have been feeling pretty good. Reports that he feels comfortable with today being last day today. Puts shoulder at roughly 40% improved and R knee at 50% improved as compared to first day      Outcome Measures (most recent score):  SPADI: 36.15% disability    Goals:  Short-term goals:  To be achieved in 2-4 weeks:     Instruct in home program  1.) Patient will understand biomechanical stressors of the knee joint in order to make modifications to activities to reduce further risk of injury.  2.) Patient will be independent with a short-term home exercise program.  3.) Improve R shoulder active range of motion to 135 degrees to allow increased ability to reach up overhead     *All ST goals met at this time    Long-term goals:  To be achieved in 8-10 weeks:     Return to previous level of function MET  Self management of symptoms. MET  1.) Patient will increase bilateral hip abduction strength to 4+/5 bilaterally MET  2.) Patient will be able to negotiate a flight of stairs with reciprocal pattern independently. MET  3.) Improve R shoulder ER strength to 4+/5 to allow increased ability perform daily movements and lifting activities MET     Assessment:  Throughout course of therapy, patient has improved overall R shoulder strength and mobility, reduced pain, and improved daily capacity to perform ADLs with R shoulder and knee. Patient to continue with HEP provided for effective management and further improvement of symptoms outside of clinic.    Plan:  Discharge from therapy.    Discharge:    Reason for Discharge: Patient has maximized benefit of skilled PT at this time    Equipment  Issued: none    Discharge Plan: Patient to continue home program.

## 2019-12-10 DIAGNOSIS — F03.91 DEMENTIA WITH BEHAVIORAL DISTURBANCE, UNSPECIFIED DEMENTIA TYPE: ICD-10-CM

## 2019-12-12 RX ORDER — QUETIAPINE FUMARATE 300 MG/1
TABLET, FILM COATED ORAL
Qty: 84 TABLET | Refills: 0 | Status: SHIPPED | OUTPATIENT
Start: 2019-12-12 | End: 2020-01-03

## 2019-12-12 NOTE — TELEPHONE ENCOUNTER
QUEtiapine (SEROQUEL) 300 MG tablet      Last Written Prescription Date:  10/17/19  Last Fill Quantity: 84,   # refills: 1  Last Office Visit: 8/16/19  Future Office visit:       Routing refill request to provider for review/approval because:   A1c or Glucose on file in past 12 months     No show for appt 10/29/19

## 2019-12-30 DIAGNOSIS — F03.91 DEMENTIA WITH BEHAVIORAL DISTURBANCE, UNSPECIFIED DEMENTIA TYPE: ICD-10-CM

## 2020-01-03 RX ORDER — QUETIAPINE FUMARATE 300 MG/1
TABLET, FILM COATED ORAL
Qty: 84 TABLET | Refills: 0 | Status: SHIPPED | OUTPATIENT
Start: 2020-01-03 | End: 2020-02-07

## 2020-01-03 NOTE — TELEPHONE ENCOUNTER
seroquel      Last Written Prescription Date:  12/12/19  Last Fill Quantity: 84,   # refills: 0  Last Office Visit: 10/29/19  Future Office visit:

## 2020-02-05 DIAGNOSIS — F03.91 DEMENTIA WITH BEHAVIORAL DISTURBANCE, UNSPECIFIED DEMENTIA TYPE: ICD-10-CM

## 2020-02-07 RX ORDER — QUETIAPINE FUMARATE 300 MG/1
TABLET, FILM COATED ORAL
Qty: 84 TABLET | Refills: 4 | Status: ON HOLD | OUTPATIENT
Start: 2020-02-07 | End: 2022-04-01

## 2020-02-07 NOTE — TELEPHONE ENCOUNTER
QUEtiapine (SEROQUEL) 300 MG tablet      Last Written Prescription Date:  1/3/20  Last Fill Quantity: 84,   # refills: 0  Last Office Visit: 8/16/19  Future Office visit:       Routing refill request to provider for review/approval because:  No show for appt 10/29/19. Please advise. Thank you!

## 2020-02-28 DIAGNOSIS — E03.9 HYPOTHYROIDISM, UNSPECIFIED TYPE: ICD-10-CM

## 2020-02-28 DIAGNOSIS — E78.5 HYPERLIPIDEMIA LDL GOAL <100: ICD-10-CM

## 2020-02-28 DIAGNOSIS — K59.00 CONSTIPATION, UNSPECIFIED CONSTIPATION TYPE: ICD-10-CM

## 2020-02-28 DIAGNOSIS — E55.9 VITAMIN D DEFICIENCY: ICD-10-CM

## 2020-03-03 RX ORDER — CHOLECALCIFEROL (VITAMIN D3) 25 MCG
TABLET ORAL
Qty: 28 TABLET | Refills: 0 | Status: SHIPPED | OUTPATIENT
Start: 2020-03-03 | End: 2020-03-27

## 2020-03-03 RX ORDER — DOCUSATE SODIUM 100 MG/1
CAPSULE, LIQUID FILLED ORAL
Qty: 112 CAPSULE | Refills: 0 | Status: SHIPPED | OUTPATIENT
Start: 2020-03-03 | End: 2020-03-27

## 2020-03-03 RX ORDER — SIMVASTATIN 40 MG
TABLET ORAL
Qty: 28 TABLET | Refills: 0 | Status: SHIPPED | OUTPATIENT
Start: 2020-03-03 | End: 2020-03-27

## 2020-03-03 RX ORDER — LEVOTHYROXINE SODIUM 50 UG/1
TABLET ORAL
Qty: 28 TABLET | Refills: 0 | Status: SHIPPED | OUTPATIENT
Start: 2020-03-03 | End: 2020-03-27

## 2020-03-27 DIAGNOSIS — E78.5 HYPERLIPIDEMIA LDL GOAL <100: ICD-10-CM

## 2020-03-27 DIAGNOSIS — E55.9 VITAMIN D DEFICIENCY: ICD-10-CM

## 2020-03-27 DIAGNOSIS — E03.9 HYPOTHYROIDISM, UNSPECIFIED TYPE: ICD-10-CM

## 2020-03-27 DIAGNOSIS — K59.00 CONSTIPATION, UNSPECIFIED CONSTIPATION TYPE: ICD-10-CM

## 2020-03-27 RX ORDER — LEVOTHYROXINE SODIUM 50 UG/1
TABLET ORAL
Qty: 28 TABLET | Refills: 0 | Status: SHIPPED | OUTPATIENT
Start: 2020-03-27 | End: 2021-10-06

## 2020-03-27 RX ORDER — CHOLECALCIFEROL (VITAMIN D3) 25 MCG
TABLET ORAL
Qty: 28 TABLET | Refills: 0 | Status: SHIPPED | OUTPATIENT
Start: 2020-03-27 | End: 2021-10-06

## 2020-03-27 RX ORDER — SIMVASTATIN 40 MG
TABLET ORAL
Qty: 28 TABLET | Refills: 0 | Status: SHIPPED | OUTPATIENT
Start: 2020-03-27 | End: 2021-10-06

## 2020-03-27 RX ORDER — DOCUSATE SODIUM 100 MG/1
CAPSULE, LIQUID FILLED ORAL
Qty: 112 CAPSULE | Refills: 0 | Status: SHIPPED | OUTPATIENT
Start: 2020-03-27 | End: 2021-04-29

## 2020-03-27 NOTE — TELEPHONE ENCOUNTER
Zocor      Last Written Prescription Date:  3/3/20  Last Fill Quantity: 28,   # refills: 0  Last Office Visit: 10/29/19  Future Office visit:       Routing refill request to provider for review/approval because:  Medication is reported/historical    Vitamin D3      Last Written Prescription Date:  3/3/20  Last Fill Quantity: 28,   # refills: 0  Last Office Visit: 10/29/19  Future Office visit:       Routing refill request to provider for review/approval because:  Medication is reported/historical    Levothyroxine      Last Written Prescription Date:  3/3/20  Last Fill Quantity: 28,   # refills: 0  Last Office Visit: 10/29/19  Future Office visit:       Routing refill request to provider for review/approval because:  Medication is reported/historical    Colace      Last Written Prescription Date:  3/3/20  Last Fill Quantity: 112,   # refills: 0  Last Office Visit: 10/29/19  Future Office visit:       Routing refill request to provider for review/approval because:  Medication is reported/historical

## 2020-04-13 ENCOUNTER — TELEPHONE (OUTPATIENT)
Dept: FAMILY MEDICINE | Facility: OTHER | Age: 75
End: 2020-04-13

## 2020-04-13 NOTE — TELEPHONE ENCOUNTER
Lenka from [facility] calling to request a copy of the most recent H&P for the patient or the most recent annual physical report sent to them. Please call to discuss. Ashley A. Lechevalier, LPN on 4/13/2020 at 1:45 PM

## 2020-04-13 NOTE — TELEPHONE ENCOUNTER
Tried calling Lenka back at  no inquire about H&P, no answer and no option to leave msg.  Danna Jha LPN

## 2020-04-14 NOTE — TELEPHONE ENCOUNTER
Tried calling [facility] no answer or option to leave msg. I will continue to attempts to get a hold of her.  Danna Jha LPN

## 2020-04-14 NOTE — TELEPHONE ENCOUNTER
Spoke with Monserrat from , they will be able to use last H&P, directed her to utilize release of information for patients medical records. She will pass info onto Lenka.  Danna Jha LPN

## 2020-04-14 NOTE — TELEPHONE ENCOUNTER
----- Message from ROBERT Akbar MD sent at 4/13/2020  7:10 PM CDT -----  If she needs records she should go through medical record dept release of information. Can you call her and let her know that

## 2020-09-17 DIAGNOSIS — H04.123 DRY EYES: Primary | ICD-10-CM

## 2020-09-17 RX ORDER — GLYCERIN .002; .002; .01 MG/MG; MG/MG; MG/MG
SOLUTION/ DROPS OPHTHALMIC
Qty: 15 ML | Refills: 5 | Status: ON HOLD | OUTPATIENT
Start: 2020-09-17 | End: 2021-05-24

## 2020-09-17 NOTE — TELEPHONE ENCOUNTER
ARTIFICIAL TEARS      Last Written Prescription Date:  UNKNOWN  Last Fill Quantity: UNKNOWN,   # refills: UNKNOWN  Last Office Visit: 10-  Future Office visit:       Routing refill request to provider for review/approval because:  Drug not active on patient's medication list

## 2020-12-21 ENCOUNTER — RESULTS ONLY (OUTPATIENT)
Dept: LAB | Age: 75
End: 2020-12-21

## 2020-12-21 ENCOUNTER — APPOINTMENT (OUTPATIENT)
Dept: LAB | Facility: HOSPITAL | Age: 75
End: 2020-12-21
Attending: FAMILY MEDICINE
Payer: MEDICARE

## 2020-12-21 LAB
ALBUMIN SERPL-MCNC: 2.9 G/DL (ref 3.4–5)
ALP SERPL-CCNC: 82 U/L (ref 40–150)
ALT SERPL W P-5'-P-CCNC: 12 U/L (ref 0–70)
ANION GAP SERPL CALCULATED.3IONS-SCNC: 7 MMOL/L (ref 3–14)
AST SERPL W P-5'-P-CCNC: 13 U/L (ref 0–45)
BASOPHILS # BLD AUTO: 0 10E9/L (ref 0–0.2)
BASOPHILS NFR BLD AUTO: 0.5 %
BILIRUB SERPL-MCNC: 0.3 MG/DL (ref 0.2–1.3)
BUN SERPL-MCNC: 11 MG/DL (ref 7–30)
CALCIUM SERPL-MCNC: 8.2 MG/DL (ref 8.5–10.1)
CHLORIDE SERPL-SCNC: 109 MMOL/L (ref 94–109)
CHOLEST SERPL-MCNC: 115 MG/DL
CO2 SERPL-SCNC: 26 MMOL/L (ref 20–32)
CREAT SERPL-MCNC: 0.89 MG/DL (ref 0.66–1.25)
DIFFERENTIAL METHOD BLD: ABNORMAL
EOSINOPHIL # BLD AUTO: 0.1 10E9/L (ref 0–0.7)
EOSINOPHIL NFR BLD AUTO: 1.7 %
ERYTHROCYTE [DISTWIDTH] IN BLOOD BY AUTOMATED COUNT: 13.6 % (ref 10–15)
GFR SERPL CREATININE-BSD FRML MDRD: 82 ML/MIN/{1.73_M2}
GLUCOSE SERPL-MCNC: 91 MG/DL (ref 70–99)
HCT VFR BLD AUTO: 39.1 % (ref 40–53)
HDLC SERPL-MCNC: 47 MG/DL
HGB BLD-MCNC: 12.6 G/DL (ref 13.3–17.7)
IMM GRANULOCYTES # BLD: 0 10E9/L (ref 0–0.4)
IMM GRANULOCYTES NFR BLD: 0.3 %
LDLC SERPL CALC-MCNC: 48 MG/DL
LYMPHOCYTES # BLD AUTO: 1.5 10E9/L (ref 0.8–5.3)
LYMPHOCYTES NFR BLD AUTO: 25.1 %
MAGNESIUM SERPL-MCNC: 2.1 MG/DL (ref 1.6–2.3)
MCH RBC QN AUTO: 29.9 PG (ref 26.5–33)
MCHC RBC AUTO-ENTMCNC: 32.2 G/DL (ref 31.5–36.5)
MCV RBC AUTO: 93 FL (ref 78–100)
MONOCYTES # BLD AUTO: 0.6 10E9/L (ref 0–1.3)
MONOCYTES NFR BLD AUTO: 9.5 %
NEUTROPHILS # BLD AUTO: 3.8 10E9/L (ref 1.6–8.3)
NEUTROPHILS NFR BLD AUTO: 62.9 %
NONHDLC SERPL-MCNC: 68 MG/DL
NRBC # BLD AUTO: 0 10*3/UL
NRBC BLD AUTO-RTO: 0 /100
PLATELET # BLD AUTO: 174 10E9/L (ref 150–450)
POTASSIUM SERPL-SCNC: 3.8 MMOL/L (ref 3.4–5.3)
PROT SERPL-MCNC: 6.5 G/DL (ref 6.8–8.8)
RBC # BLD AUTO: 4.22 10E12/L (ref 4.4–5.9)
SODIUM SERPL-SCNC: 142 MMOL/L (ref 133–144)
TRIGL SERPL-MCNC: 99 MG/DL
TSH SERPL DL<=0.005 MIU/L-ACNC: 1.92 MU/L (ref 0.4–4)
WBC # BLD AUTO: 6 10E9/L (ref 4–11)

## 2020-12-24 ENCOUNTER — APPOINTMENT (OUTPATIENT)
Dept: CT IMAGING | Facility: HOSPITAL | Age: 75
End: 2020-12-24
Attending: NURSE PRACTITIONER
Payer: MEDICARE

## 2020-12-24 ENCOUNTER — APPOINTMENT (OUTPATIENT)
Dept: GENERAL RADIOLOGY | Facility: HOSPITAL | Age: 75
End: 2020-12-24
Attending: NURSE PRACTITIONER
Payer: MEDICARE

## 2020-12-24 ENCOUNTER — HOSPITAL ENCOUNTER (EMERGENCY)
Facility: HOSPITAL | Age: 75
Discharge: HOME | End: 2020-12-24
Attending: NURSE PRACTITIONER | Admitting: NURSE PRACTITIONER
Payer: MEDICARE

## 2020-12-24 VITALS
SYSTOLIC BLOOD PRESSURE: 144 MMHG | DIASTOLIC BLOOD PRESSURE: 93 MMHG | TEMPERATURE: 97.8 F | RESPIRATION RATE: 16 BRPM | OXYGEN SATURATION: 96 % | HEART RATE: 64 BPM

## 2020-12-24 DIAGNOSIS — M25.561 ACUTE PAIN OF RIGHT KNEE: Primary | ICD-10-CM

## 2020-12-24 DIAGNOSIS — R51.9 ACUTE NONINTRACTABLE HEADACHE, UNSPECIFIED HEADACHE TYPE: ICD-10-CM

## 2020-12-24 PROCEDURE — 99284 EMERGENCY DEPT VISIT MOD MDM: CPT | Mod: 25

## 2020-12-24 PROCEDURE — 73562 X-RAY EXAM OF KNEE 3: CPT | Mod: RT

## 2020-12-24 PROCEDURE — 70450 CT HEAD/BRAIN W/O DYE: CPT

## 2020-12-24 PROCEDURE — 99284 EMERGENCY DEPT VISIT MOD MDM: CPT | Performed by: NURSE PRACTITIONER
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
## 2020-12-24 NOTE — ED NOTES
Christoph from [facility] transportation states they will have a  here shortly to get back back to [facility].
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
## 2020-12-24 NOTE — ED AVS SNAPSHOT
[facility]  [address] [address]  Phone: [phone]                                    Christiano Leon   MRN: [id number]    Department: [facility]   Date of Visit: 12/24/2020           After Visit Summary Signature Page    I have received my discharge instructions, and my questions have been answered. I have discussed any challenges I see with this plan with the nurse or doctor.    ..........................................................................................................................................  Patient/Patient Representative Signature      ..........................................................................................................................................  Patient Representative Print Name and Relationship to Patient    ..................................................               ................................................  Date                                   Time    ..........................................................................................................................................  Reviewed by Signature/Title    ...................................................              ..............................................  Date                                               Time          22EPIC Rev 08/18

## 2020-12-24 NOTE — ED NOTES
Discharge instructions reviewed. Verbalized understanding. Denies pain. States he will walk back to [facility]. Patient informed it is too cold and slippery out and we will find him a ride. Verbalized understanding and states he will wait in room for ride.
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
## 2020-12-29 ASSESSMENT — ENCOUNTER SYMPTOMS
RHINORRHEA: 0
DIFFICULTY URINATING: 0
APPETITE CHANGE: 0
FREQUENCY: 0
FEVER: 0
WOUND: 0
HEADACHES: 0
VOMITING: 0
HEMATURIA: 0
DIZZINESS: 0
ARTHRALGIAS: 1
COLOR CHANGE: 0
NAUSEA: 0
LIGHT-HEADEDNESS: 0
NECK PAIN: 0
PALPITATIONS: 0
CHILLS: 0
SORE THROAT: 0
WEAKNESS: 0
COUGH: 0
NECK STIFFNESS: 0
DYSURIA: 0
NUMBNESS: 0
FLANK PAIN: 0
SHORTNESS OF BREATH: 0
SEIZURES: 0
PHOTOPHOBIA: 0
DIARRHEA: 0
BLOOD IN STOOL: 0
BACK PAIN: 0
ABDOMINAL PAIN: 0

## 2021-04-01 NOTE — PROGRESS NOTES
"    Assessment & Plan     Screening for colon cancer    - GENERAL SURG ADULT REFERRAL  Due for colonoscopy will refer him to our general surgeon for that.  Chronic pain of right knee    - ORTHOPEDIC ADULT REFERRAL; Future  - XR Knee Right 3 Views; Future  Chronic pain right knee we will get a knee x-ray today he is requesting an injection.  I told him I do not do that procedure but will refer him to Ortho and they will review the x-ray and can discuss that further.  He has had 2 previous surgeries on his right knee.  He denies any locking or giving out with the knee but is complaining more of lateral knee pain                   R Dustin Akbar MD  [facility] - LESLEE Alvarado is a 76 year old who presents for the following health issues     HPI     Musculoskeletal problem/pain  Onset/Duration: has been ongoing for multiple years  Description  Location: knee - right  Joint Swelling: no  Redness: no  Pain: YES  Warmth: no  Intensity:  mild, 5/10  Progression of Symptoms:  same and constant  Accompanying signs and symptoms:   Fevers: no  Numbness/tingling/weakness: YES-sometimes  History  Trauma to the area: YES- has had surgeries in the past on the the right knee  Recent illness:  no  Previous similar problem: YES  Previous evaluation:  YES  Precipitating or alleviating factors:  Aggravating factors include: carring things and long walking becomes a problem  Therapies tried and outcome: acetaminophen    He also requests to get a colonoscopy    Review of Systems   Constitutional, HEENT, cardiovascular, pulmonary, gi and gu systems are negative, except as otherwise noted.      Objective    BP (!) 136/94 (BP Location: Right arm, Patient Position: Sitting, Cuff Size: Adult Regular)   Pulse 72   Temp 97.6  F (36.4  C) (Tympanic)   Ht 1.803 m (5' 11\")   Wt 79.4 kg (175 lb)   SpO2 96%   BMI 24.41 kg/m    Body mass index is 24.41 kg/m .  Physical Exam   GENERAL: healthy, alert and no " distress  EYES: Eyes grossly normal to inspection, PERRL and conjunctivae and sclerae normal  NECK: no adenopathy, no asymmetry, masses, or scars and thyroid normal to palpation  RESP: lungs clear to auscultation - no rales, rhonchi or wheezes  CV: regular rate and rhythm, normal S1 S2, no S3 or S4, no murmur, click or rub, no peripheral edema and peripheral pulses strong  ABDOMEN: soft, nontender, no hepatosplenomegaly, no masses and bowel sounds normal  MS: Right knee slight swelling lateral there is no joint line tenderness he has full flexion extension no obvious instability no edema no redness

## 2021-04-06 ENCOUNTER — OFFICE VISIT (OUTPATIENT)
Dept: FAMILY MEDICINE | Facility: OTHER | Age: 76
End: 2021-04-06
Attending: FAMILY MEDICINE
Payer: MEDICARE

## 2021-04-06 ENCOUNTER — ANCILLARY PROCEDURE (OUTPATIENT)
Dept: GENERAL RADIOLOGY | Facility: OTHER | Age: 76
End: 2021-04-06
Attending: FAMILY MEDICINE
Payer: MEDICARE

## 2021-04-06 VITALS
HEIGHT: 71 IN | SYSTOLIC BLOOD PRESSURE: 136 MMHG | HEART RATE: 72 BPM | BODY MASS INDEX: 24.5 KG/M2 | WEIGHT: 175 LBS | DIASTOLIC BLOOD PRESSURE: 94 MMHG | TEMPERATURE: 97.6 F | OXYGEN SATURATION: 96 %

## 2021-04-06 DIAGNOSIS — G89.29 CHRONIC PAIN OF RIGHT KNEE: ICD-10-CM

## 2021-04-06 DIAGNOSIS — M25.561 CHRONIC PAIN OF RIGHT KNEE: ICD-10-CM

## 2021-04-06 DIAGNOSIS — Z12.11 SCREENING FOR COLON CANCER: Primary | ICD-10-CM

## 2021-04-06 PROCEDURE — 73562 X-RAY EXAM OF KNEE 3: CPT | Mod: TC,RT

## 2021-04-06 PROCEDURE — G0463 HOSPITAL OUTPT CLINIC VISIT: HCPCS

## 2021-04-06 PROCEDURE — 99214 OFFICE O/P EST MOD 30 MIN: CPT | Performed by: FAMILY MEDICINE

## 2021-04-06 ASSESSMENT — PAIN SCALES - GENERAL: PAINLEVEL: MODERATE PAIN (5)

## 2021-04-06 ASSESSMENT — MIFFLIN-ST. JEOR: SCORE: 1535.92

## 2021-04-06 NOTE — NURSING NOTE
"Chief Complaint   Patient presents with     Musculoskeletal Problem       Initial Blood Pressure (Abnormal) 136/94 (BP Location: Right arm, Patient Position: Sitting, Cuff Size: Adult Regular)   Pulse 72   Temperature 97.6  F (36.4  C) (Tympanic)   Height 1.803 m (5' 11\")   Weight 79.4 kg (175 lb)   Oxygen Saturation 96%   Body Mass Index 24.41 kg/m   Estimated body mass index is 24.41 kg/m  as calculated from the following:    Height as of this encounter: 1.803 m (5' 11\").    Weight as of this encounter: 79.4 kg (175 lb).  Medication Reconciliation: complete  Maye Vigil LPN  "

## 2021-04-29 ENCOUNTER — TELEPHONE (OUTPATIENT)
Dept: FAMILY MEDICINE | Facility: OTHER | Age: 76
End: 2021-04-29

## 2021-04-29 ENCOUNTER — OFFICE VISIT (OUTPATIENT)
Dept: FAMILY MEDICINE | Facility: OTHER | Age: 76
End: 2021-04-29
Attending: FAMILY MEDICINE
Payer: MEDICARE

## 2021-04-29 VITALS
HEIGHT: 71 IN | SYSTOLIC BLOOD PRESSURE: 118 MMHG | WEIGHT: 170 LBS | DIASTOLIC BLOOD PRESSURE: 86 MMHG | TEMPERATURE: 97.3 F | HEART RATE: 64 BPM | BODY MASS INDEX: 23.8 KG/M2 | OXYGEN SATURATION: 96 %

## 2021-04-29 DIAGNOSIS — E03.9 ACQUIRED HYPOTHYROIDISM: ICD-10-CM

## 2021-04-29 DIAGNOSIS — E78.00 HYPERCHOLESTEROLEMIA: ICD-10-CM

## 2021-04-29 DIAGNOSIS — K62.5 RECTAL BLEEDING: Primary | ICD-10-CM

## 2021-04-29 LAB
ALBUMIN SERPL-MCNC: 3.6 G/DL (ref 3.4–5)
ALP SERPL-CCNC: 80 U/L (ref 40–150)
ALT SERPL W P-5'-P-CCNC: 20 U/L (ref 0–70)
ANION GAP SERPL CALCULATED.3IONS-SCNC: 4 MMOL/L (ref 3–14)
AST SERPL W P-5'-P-CCNC: 12 U/L (ref 0–45)
BASOPHILS # BLD AUTO: 0 10E9/L (ref 0–0.2)
BASOPHILS NFR BLD AUTO: 0.6 %
BILIRUB SERPL-MCNC: 0.3 MG/DL (ref 0.2–1.3)
BUN SERPL-MCNC: 19 MG/DL (ref 7–30)
CALCIUM SERPL-MCNC: 8.8 MG/DL (ref 8.5–10.1)
CHLORIDE SERPL-SCNC: 110 MMOL/L (ref 94–109)
CHOLEST SERPL-MCNC: 140 MG/DL
CO2 SERPL-SCNC: 29 MMOL/L (ref 20–32)
CREAT SERPL-MCNC: 1 MG/DL (ref 0.66–1.25)
DIFFERENTIAL METHOD BLD: NORMAL
EOSINOPHIL # BLD AUTO: 0.1 10E9/L (ref 0–0.7)
EOSINOPHIL NFR BLD AUTO: 2 %
ERYTHROCYTE [DISTWIDTH] IN BLOOD BY AUTOMATED COUNT: 14.1 % (ref 10–15)
GFR SERPL CREATININE-BSD FRML MDRD: 72 ML/MIN/{1.73_M2}
GLUCOSE SERPL-MCNC: 99 MG/DL (ref 70–99)
HCT VFR BLD AUTO: 41.4 % (ref 40–53)
HDLC SERPL-MCNC: 53 MG/DL
HGB BLD-MCNC: 13.4 G/DL (ref 13.3–17.7)
IMM GRANULOCYTES # BLD: 0 10E9/L (ref 0–0.4)
IMM GRANULOCYTES NFR BLD: 0.6 %
LDLC SERPL CALC-MCNC: 73 MG/DL
LYMPHOCYTES # BLD AUTO: 1.4 10E9/L (ref 0.8–5.3)
LYMPHOCYTES NFR BLD AUTO: 25.9 %
MCH RBC QN AUTO: 29.8 PG (ref 26.5–33)
MCHC RBC AUTO-ENTMCNC: 32.4 G/DL (ref 31.5–36.5)
MCV RBC AUTO: 92 FL (ref 78–100)
MONOCYTES # BLD AUTO: 0.5 10E9/L (ref 0–1.3)
MONOCYTES NFR BLD AUTO: 8.3 %
NEUTROPHILS # BLD AUTO: 3.4 10E9/L (ref 1.6–8.3)
NEUTROPHILS NFR BLD AUTO: 62.6 %
NONHDLC SERPL-MCNC: 87 MG/DL
NRBC # BLD AUTO: 0 10*3/UL
NRBC BLD AUTO-RTO: 0 /100
PLATELET # BLD AUTO: 190 10E9/L (ref 150–450)
POTASSIUM SERPL-SCNC: 4.1 MMOL/L (ref 3.4–5.3)
PROT SERPL-MCNC: 7.3 G/DL (ref 6.8–8.8)
RBC # BLD AUTO: 4.49 10E12/L (ref 4.4–5.9)
SODIUM SERPL-SCNC: 143 MMOL/L (ref 133–144)
TRIGL SERPL-MCNC: 72 MG/DL
TSH SERPL DL<=0.005 MIU/L-ACNC: 1.52 MU/L (ref 0.4–4)
WBC # BLD AUTO: 5.5 10E9/L (ref 4–11)

## 2021-04-29 PROCEDURE — 36415 COLL VENOUS BLD VENIPUNCTURE: CPT | Mod: ZL | Performed by: FAMILY MEDICINE

## 2021-04-29 PROCEDURE — 80061 LIPID PANEL: CPT | Mod: ZL | Performed by: FAMILY MEDICINE

## 2021-04-29 PROCEDURE — 99214 OFFICE O/P EST MOD 30 MIN: CPT | Performed by: FAMILY MEDICINE

## 2021-04-29 PROCEDURE — G0463 HOSPITAL OUTPT CLINIC VISIT: HCPCS | Mod: 25

## 2021-04-29 PROCEDURE — 84443 ASSAY THYROID STIM HORMONE: CPT | Mod: ZL | Performed by: FAMILY MEDICINE

## 2021-04-29 PROCEDURE — 85025 COMPLETE CBC W/AUTO DIFF WBC: CPT | Mod: ZL | Performed by: FAMILY MEDICINE

## 2021-04-29 PROCEDURE — 80053 COMPREHEN METABOLIC PANEL: CPT | Mod: ZL | Performed by: FAMILY MEDICINE

## 2021-04-29 ASSESSMENT — ANXIETY QUESTIONNAIRES
7. FEELING AFRAID AS IF SOMETHING AWFUL MIGHT HAPPEN: NOT AT ALL
GAD7 TOTAL SCORE: 0
6. BECOMING EASILY ANNOYED OR IRRITABLE: NOT AT ALL
4. TROUBLE RELAXING: NOT AT ALL
3. WORRYING TOO MUCH ABOUT DIFFERENT THINGS: NOT AT ALL
1. FEELING NERVOUS, ANXIOUS, OR ON EDGE: NOT AT ALL
5. BEING SO RESTLESS THAT IT IS HARD TO SIT STILL: NOT AT ALL
2. NOT BEING ABLE TO STOP OR CONTROL WORRYING: NOT AT ALL

## 2021-04-29 ASSESSMENT — MIFFLIN-ST. JEOR: SCORE: 1513.24

## 2021-04-29 ASSESSMENT — PAIN SCALES - GENERAL: PAINLEVEL: NO PAIN (0)

## 2021-04-29 ASSESSMENT — PATIENT HEALTH QUESTIONNAIRE - PHQ9: SUM OF ALL RESPONSES TO PHQ QUESTIONS 1-9: 0

## 2021-04-29 NOTE — TELEPHONE ENCOUNTER
Spoke with Marisabel at facility notified of lab results and referral for colonoscopy verbalized understanding.  Danna Jha LPN

## 2021-04-29 NOTE — PROGRESS NOTES
"    Assessment & Plan     Rectal bleeding    - GENERAL SURG ADULT REFERRAL  - CBC with platelets and differential    Acquired hypothyroidism    - TSH with free T4 reflex    Hypercholesterolemia    - Lipid Profile (Chol, Trig, HDL, LDL calc)  - Comprehensive metabolic panel (BMP + Alb, Alk Phos, ALT, AST, Total. Bili, TP)                   ROBERT Akbar MD  [facility] - LESLEE Alvarado is a 76 year old who presents for the following health issues     HPI     Concern - Referral for colonoscopy  Onset: 2 weeks  Description: Blood in stool 2 weeks ago  Intensity: moderate  Progression of Symptoms:  improving  Accompanying Signs & Symptoms: Constipation and blood in stool  Previous history of similar problem: Yes  Precipitating factors:        Worsened by: NA  Alleviating factors:        Improved by: NA  Therapies tried and outcome:  none   He has had some rectal bleeding he did have an EGD a few years ago he has no symptoms of reflux now but did have some painless rectal bleeding.  He also is on thyroid replacement we will check a TSH and will check lipids for hypercholesterolemia and check a CMP we will call him with results.  Will refer him to surgery for the colonoscopy.      Review of Systems   Constitutional, HEENT, cardiovascular, pulmonary, gi and gu systems are negative, except as otherwise noted.      Objective    /86 (BP Location: Left arm, Patient Position: Sitting, Cuff Size: Adult Large)   Pulse 64   Temp 97.3  F (36.3  C) (Tympanic)   Ht 1.803 m (5' 11\")   Wt 77.1 kg (170 lb)   SpO2 96%   BMI 23.71 kg/m    Body mass index is 23.71 kg/m .  Physical Exam   GENERAL: healthy, alert and no distress  EYES: Eyes grossly normal to inspection, PERRL and conjunctivae and sclerae normal  NECK: no adenopathy, no asymmetry, masses, or scars and thyroid normal to palpation  RESP: lungs clear to auscultation - no rales, rhonchi or wheezes  CV: regular rate and rhythm, normal S1 S2, " no S3 or S4, no murmur, click or rub, no peripheral edema and peripheral pulses strong  ABDOMEN: soft, nontender, no hepatosplenomegaly, no masses and bowel sounds normal  MS: no gross musculoskeletal defects noted, no edema

## 2021-04-29 NOTE — NURSING NOTE
History     Chief Complaint:  Allergic reaction    HPI   Abbie Cabello is a 36 year old female who presents with concerns for allergic reaction. The patient reports she had gotten off work at around 2130 and initially developed an upset stomach which was followed by diarrhea. The patient had laid down and awoke to an itchy rash on her back and extremities with facial swelling and took benadryl immediately afterwards. Patient was concerned, as she had a similar but more severe reaction after she had eaten bananas, which prompts her visit. No other concerns. The patient feels her symptoms have significantly resolved. No other concerns.    Allergies:  The patient has no known allergies to medications.      Medications:    The patient is not currently taking any prescribed medications. None on file.    Past Medical History:    Thyroid problem    Past Surgical History:    The patient has no pertinent surgical history.     Family History:    The patient has no pertinent family history.     Social History:  Patient presents with friend.     Review of Systems   HENT: Positive for facial swelling.    Skin: Positive for rash.   All other systems reviewed and are negative.    Physical Exam     Patient Vitals for the past 24 hrs:   BP Temp Temp src Heart Rate Resp SpO2   08/03/17 0010 110/89 99.4  F (37.4  C) Oral 91 16 97 %      Physical Exam  Constitutional: The patient is oriented to person, place, and time.   HENT:   Head: Atraumatic  Right Ear: Normal  Left Ear: Normal  Nose: Nose normal.   Mouth/Throat: Oropharynx is clear and moist. No erythema or exudate.   Eyes: Conjunctivae and EOM are normal. Pupils are equal, round, and reactive to light. No discharge  Neck: Normal range of motion. Neck supple.   Cardiovascular: Normal rate, regular rhythm, no murmur gallops or rubs. Intact distal pulses.    Pulmonary/Chest: CTA bilaterally. No wheezes rale or rhonchi.  Abdominal: Soft. Non tender.  No masses   Musculoskeletal: No  "Chief Complaint   Patient presents with     Gastrointestinal Problem       Initial /86 (BP Location: Left arm, Patient Position: Sitting, Cuff Size: Adult Large)   Pulse 64   Temp 97.3  F (36.3  C) (Tympanic)   Ht 1.803 m (5' 11\")   Wt 77.1 kg (170 lb)   SpO2 96%   BMI 23.71 kg/m   Estimated body mass index is 23.71 kg/m  as calculated from the following:    Height as of this encounter: 1.803 m (5' 11\").    Weight as of this encounter: 77.1 kg (170 lb).  Medication Reconciliation: complete  Danna Jha LPN  " edema. No bony deformity. Normal range of motion  Lymphadenopathy:     The patient has no cervical adenopathy.   Neurological: The patient is alert and oriented to person, place, and time. The patient has normal strength and normal reflexes. No cranial nerve deficit. Coordination normal.  Skin: Skin is warm and dry. No rash noted. The patient is not diaphoretic.   Psychiatric: The patient has a normal mood and affect.    Emergency Department Course   Interventions:  Decadron 10 mg oral solution    Emergency Department Course:  Nursing notes and vitals reviewed.  I performed an exam of the patient as documented above.     The patient received the intervention(s) above.     Findings and plan explained to the Patient. Patient discharged home with instructions regarding supportive care, medications, and reasons to return. The importance of close follow-up was reviewed.     Impression & Plan    Medical Decision Making:  Abbie Cabello is a 36 year old female presents with itchy rash which has since resolved after receiving benadryl prior to arrival. Physical exam now is completely unremarkable without signs of stridor or respiratory distress. Hives have resolved as well. Based on description, sounds like she had a generalized allergic reaction. At this point, I feel she can be safely discharged. She was given a single oral dose of decadron. I did write for prescription for epi pen. Return for problems.     Diagnosis:    ICD-10-CM    1. Allergic reaction, initial encounter T78.40XA        Disposition:  Discharged.    Discharge Medications:  New Prescriptions    EPINEPHRINE (EPIPEN/ADRENACLICK/OR ANY BX GENERIC EQUIV) 0.3 MG/0.3ML INJECTION 2-PACK    Inject 0.3 mLs (0.3 mg) into the muscle once as needed         Mikey KIRK, am serving as a scribe at 12:16 AM on 8/3/2017 to document services personally performed by Dr. Gar, based on my observations and the provider's statements to me.     EMERGENCY DEPARTMENT        Marbin Gar MD  08/03/17 4744

## 2021-04-30 ASSESSMENT — ANXIETY QUESTIONNAIRES: GAD7 TOTAL SCORE: 0

## 2021-05-19 ENCOUNTER — OFFICE VISIT (OUTPATIENT)
Dept: SURGERY | Facility: OTHER | Age: 76
End: 2021-05-19
Attending: FAMILY MEDICINE
Payer: MEDICARE

## 2021-05-19 ENCOUNTER — PREP FOR PROCEDURE (OUTPATIENT)
Dept: SURGERY | Facility: OTHER | Age: 76
End: 2021-05-19

## 2021-05-19 VITALS
SYSTOLIC BLOOD PRESSURE: 110 MMHG | TEMPERATURE: 95.4 F | OXYGEN SATURATION: 96 % | BODY MASS INDEX: 23.66 KG/M2 | HEART RATE: 75 BPM | WEIGHT: 169 LBS | DIASTOLIC BLOOD PRESSURE: 68 MMHG | HEIGHT: 71 IN | RESPIRATION RATE: 16 BRPM

## 2021-05-19 DIAGNOSIS — K62.5 BRIGHT RED BLOOD PER RECTUM: Primary | ICD-10-CM

## 2021-05-19 DIAGNOSIS — Z01.818 PREOP TESTING: Primary | ICD-10-CM

## 2021-05-19 DIAGNOSIS — K62.5 RECTAL BLEEDING: ICD-10-CM

## 2021-05-19 PROCEDURE — 99214 OFFICE O/P EST MOD 30 MIN: CPT | Performed by: SURGERY

## 2021-05-19 PROCEDURE — G0463 HOSPITAL OUTPT CLINIC VISIT: HCPCS

## 2021-05-19 ASSESSMENT — PAIN SCALES - GENERAL: PAINLEVEL: NO PAIN (0)

## 2021-05-19 ASSESSMENT — MIFFLIN-ST. JEOR: SCORE: 1508.71

## 2021-05-19 NOTE — PATIENT INSTRUCTIONS
We want to your Upper Endoscopy and Colonoscopy to be as pleasant as possible. Please review the instructions below. If you have questions, you may contact us at the any of the following numbers:   Clinic Health Unit Coordinator: [phone]  Clinic Nurse (Eva): [phone]  Surgery Education Nurse: [phone]      Date of procedure: 5/24/2021 with Dr. Silvio Guzmán  Admit Time: Hospital Surgery will call you this Friday before your procedure by 5pm with your arrival time.   If you are not contacted before 5PM, please call admitting at [phone].   After hours or on weekends, please call [phone] to postpone.   Call the clinic nurse if you become ill within 1 week of your procedure to reschedule.     COVID-19 test is needed 4 days before procedure in the morning. This testing is done in the [facility] on the [place] [facility]  Follow the signage for parking and bring your mobile phone (if you have one) to call the phone number ([phone]) on the sign outside the testing site for check-in. Stay in your vehicle until you are directed to enter. If you do not have a cell phone, please call [phone] right before you leave home to check in for your COVID test.   This has been scheduled for 5/20/2021 at 8:45 at the [facility] site.      Please  the following over the counter items for your bowel prep:    Two 5 mg Dulcolax (bisacodyl) tablets   One 8.3 ounce (238 g) bottle of Miralax   One 10 ounce bottle of liquid Magnesium Citrate-not capsules.   One 64 ounce bottle of Gatorade-Not red, purple, or powdered.   (you will need additional sports drink for the 2 days before colonoscopy)    7 DAYS BEFORE THE EXAM:   Now  Call the Surgery Education Nurse at [phone] and have a medication list ready.   Stop Aspirin or NSAIDS (Ibuprofen, Celebrex, Naproxen, etc) 7 days (now)  before surgery.  Stop fiber supplements, herbals, vitamins, and iron. Stop eating corn, nuts and  seeds.    2 DAYS BEFORE THE EXAM:   5/22/2021  Low fiber diet.   See table at the end of instructions for list of low fiber foods.  Drink at least 4-6 large glasses of sports drink today and tomorrow (separate from bowel prep).   Avoid red and purple.    1 DAY BEFORE THE EXAM:   5/23/2021  No solid food/milk products after 12:00 AM, midnight.   Drink only clear liquids all day, at least 8-10 glasses, including 4-6 glasses sports drink.   See table at the end of instructions for list of clear liquids.    Avoid anything red or purple. No alcohol.            AT 12:00 PM NOON THE DAY BEFORE EXAM:  Take 2 Dulcolax tablets by mouth with clear liquids.            AT 6:00 PM THE DAY BEFORE EXAM:  Mix the bottle of Miralax and 64 oz. of Gatorade in a pitcher.   Drink one 8 oz. glass every 10-15 minutes until gone. Stay near a toilet.     DAY OF COLONOSCOPY/UPPER ENDOSCOPY:   5/24/2021            6 HOURS PRIOR TO EXAM ON DAY OF PROCEDURE:    Drink the bottle of Magnesium Citrate followed by a full glass of water.   You may have clear liquids up until 4 hours before arrival, then nothing by mouth.  If you need to take any medications after this, take them with a tiny sip of water.   If you have asthma, bring your inhaler with you.  Shower before arrival and wear clean, comfortable clothes.   No jewelry, make-up, nail polish, hair spray, lotions, or perfumes.   [place] in Admitting through the [place] En[facility]ance.   You must have a responsible adult to drive and to stay with you for 4 hours at home.       Low Fiber Diet    You may have: Do NOT have:   Starches:        White breads (tortillas, biscuits, toast, pancakes, waffles, etc.), white rice, pasta (not whole grain), cooked and peeled potatoes, plain or saltine crackers, cooked farina or cream of rice, puffed rice, corn flakes, Rice Krispies, Special K.   Starches:       Whole grain breads; Breads containing nuts, seeds or fruit, or more than 1 gram fiber per slice, cornbread,  corn or whole wheat tortillas, potatoes with skin, brown rice, wild rice, kasha/buckwheat.   Vegetables:       Tender, well cooked and canned vegetables without seeds or peels including carrots, asparagus tips, green beans, wax  beans, spinach; vegetable broth Vegetables:       Any raw or steamed vegetables, vegetables with seeds, corn in any form   Fruits/Juices:        Strained fruit juice, canned fruit without seeds or skin (not pineapple,) applesauce, pear, ripe bananas, melons (not watermelon) Fruit/Juices:        Prunes, prune juice, raisins or other dried fruits, berries and other fruits with seeds, pineapple, fresh or frozen fruits not listed in other column   Milk Products:       Milk, cheese, cottage cheese, yogurt without berries, custard, ice cream without nuts/fruit Milk Products:       Any dairy product with nuts, seeds or berries   Proteins:       Tender, well-cooked ground beef, lamb, veal, ham, pork, chicken, turkey, fish or organ meats; eggs, creamy peanut butter Proteins:        Tough, fibrous meats with gristle, cooked dried beans, peas or lentils, crunchy peanut butter          Fats and condiments:        Margarine, butter, oils, valdez, sour cream, salad dressing, plain gravy, spices, cooked herbs, sugar, clear jelly, honey, syrup Fats and condiments:        Pickles, olives, relish, horseradish, jam, marmalade, preserves   Snacks, sweets and drinks:       Pretzels, hard candy, plain cakes and cookies without nuts or seeds, gelatin, plain pudding, sherbet, popsicles, coffee, tea, carbonated beverages Snacks, sweets and drinks:       Popcorn, nuts, seeds, granola, coconut, candies or desserts with nuts/seeds and raisins/dried fruits or whole grains.         Clear Liquid Diet  You may have: Do NOT have:     ? Tea, coffee (no cream)   Milk or milk products such as ice cream, malts or shakes     ? Water, Vitamin Water, Smart Water, Coconut Water, PowerAde, Propel, Soda pop, (Sprite, 7 UP, Ginger Ale,  Gatorade (not red or purple)   Red or purple drinks of any kind such as  Cranberry juice or grape juice.     ? Clear nutrition drinks (Resource Breeze, Ensure Active protein drink (peach flavor)   Red or purple Jell-O, Popsicles, Gerber-Aid, Sorbet and candy.     ? Jell-O, Popsicles (no milk or fruit pieces) or Italian ice (not red or purple)   Juices with pulp such as orange, grapefruit, pineapple or tomato juice     ? Water, Vitamin Water, Smart Water, Coconut Water   Cream soups of any kind     ? Fat-free soup broth or bouillon   Alcohol     ? Plain hard candy, such as clear Life Savers (not red or purple)      ? Powdered Lemonade such as Crystal Light, Country Time      ? Clear juices and fruit-flavored drinks such as apple juice, white grape juice, Hi-C and Gerber-Aid (not red or purple)      ? Honey / Sugar            TIPS FOR COLON CLEANSING BEFORE YOUR COLONOSCOPY  To get accurate results from your exam, your colon must be completely empty or you may need to repeat the colon prep and exam. If you followed instructions and your stool is clear or yellow liquid, you are ready. If you are not sure if your colon is clean, please call the clinic nurse.    You may use Tucks wipes, hemorrhoid treatments, hydrocortisone cream or alcohol-free baby wipes to ease anal irritation. You may also use Vaseline to help protect the skin.     Quickly drink each glass. Even when you are sitting on the toilet, keep drinking every 15 minutes. If you have nausea or vomiting, take a break for 30 minutes and then resume drinking.    You will have loose watery stools and may also have chills. Dress for comfort. Expect to feel bloating, nausea and other discomfort until the stool clears from your colon.

## 2021-05-19 NOTE — NURSING NOTE
"Chief Complaint   Patient presents with     Consult For     rectal bleeding and colon cancer screening. referred by Dr. Dustin Akbar       Initial /68   Pulse 75   Temp 95.4  F (35.2  C) (Tympanic)   Resp 16   Ht 1.803 m (5' 11\")   Wt 76.7 kg (169 lb)   SpO2 96%   BMI 23.57 kg/m   Estimated body mass index is 23.57 kg/m  as calculated from the following:    Height as of this encounter: 1.803 m (5' 11\").    Weight as of this encounter: 76.7 kg (169 lb).  Medication Reconciliation: complete  Sonia Leong LPN    "

## 2021-05-20 ENCOUNTER — OFFICE VISIT (OUTPATIENT)
Dept: FAMILY MEDICINE | Facility: OTHER | Age: 76
End: 2021-05-20
Attending: SURGERY
Payer: MEDICARE

## 2021-05-20 DIAGNOSIS — Z01.818 PREOP TESTING: ICD-10-CM

## 2021-05-20 LAB
SARS-COV-2 RNA RESP QL NAA+PROBE: NORMAL
SPECIMEN SOURCE: NORMAL

## 2021-05-20 PROCEDURE — U0003 INFECTIOUS AGENT DETECTION BY NUCLEIC ACID (DNA OR RNA); SEVERE ACUTE RESPIRATORY SYNDROME CORONAVIRUS 2 (SARS-COV-2) (CORONAVIRUS DISEASE [COVID-19]), AMPLIFIED PROBE TECHNIQUE, MAKING USE OF HIGH THROUGHPUT TECHNOLOGIES AS DESCRIBED BY CMS-2020-01-R: HCPCS | Mod: ZL | Performed by: SURGERY

## 2021-05-20 PROCEDURE — U0005 INFEC AGEN DETEC AMPLI PROBE: HCPCS | Mod: ZL | Performed by: SURGERY

## 2021-05-21 ENCOUNTER — ANESTHESIA EVENT (OUTPATIENT)
Dept: SURGERY | Facility: HOSPITAL | Age: 76
End: 2021-05-21
Payer: MEDICARE

## 2021-05-21 LAB
LABORATORY COMMENT REPORT: NORMAL
SARS-COV-2 RNA RESP QL NAA+PROBE: NEGATIVE
SPECIMEN SOURCE: NORMAL

## 2021-05-22 RX ORDER — POLYETHYLENE GLYCOL 3350 17 G/17G
POWDER, FOR SOLUTION ORAL
Qty: 238 G | Refills: 0 | Status: ON HOLD | OUTPATIENT
Start: 2021-05-22 | End: 2021-05-24

## 2021-05-22 RX ORDER — BISACODYL 5 MG
TABLET, DELAYED RELEASE (ENTERIC COATED) ORAL
Qty: 2 TABLET | Refills: 0 | Status: ON HOLD | OUTPATIENT
Start: 2021-05-22 | End: 2021-05-24

## 2021-05-22 NOTE — PROGRESS NOTES
Surgery Consult Clinic Note      RE: Christiano Leon  : 1945  EDILSON: 2021      Chief Complaint:  Bright red blood per rectum     History of Present Illness:  Christiano Leon is a very pleasant 76 year old year old male who I am seeing at the request of Dr. JEFF Bains for evaluation of screening colon malignant neoplasm and consideration for colonoscopy.  He denies family history of colon or rectal cancer. He notes that he has painless bright red blood with bowel movements as well as change in stool caliber. He specifically denies fever, chills, nausea, vomiting, chest pain, shortness of breath or palpitations.      Medical history:  Past Medical History:   Diagnosis Date     BPH (benign prostatic hyperplasia)      Poor dentition        Surgical history:  Past Surgical History:   Procedure Laterality Date     Bilateral cataract       ESOPHAGOSCOPY, GASTROSCOPY, DUODENOSCOPY (EGD), COMBINED N/A 5/10/2019    Procedure: UPPER ENDOSCOPY WITH BIOPSIES;  Surgeon: Silvio Guzmán MD;  Location: [facility] OR     HERNIA REPAIR Right ?     Right knee meniscectomy Right        Family history:  History reviewed. No pertinent family history.    Medications:  Current Outpatient Medications   Medication Sig Dispense Refill     Acetaminophen (TYLENOL PO) Take 1,000 mg by mouth every 6 hours as needed for mild pain or fever       ARTIFICIAL TEARS 0.2-0.2-1 % SOLN ophthalmic solution PLACE 1 DROP IN EACH EYE FOUR TIMES DAILY AS NEEDED 15 mL 5     bisacodyl (DULCOLAX) 5 MG EC tablet 2 tabs po at noon on the day before surgery. 2 tablet 0     carbamide peroxide (DEBROX) 6.5 % otic solution Place 5 drops Into the left ear 2 times daily Do this once a month for 4 days each time, for the next 6 months 30 mL 0     Docusate Sodium (COLACE PO) Take 200 mg by mouth 2 times daily       levothyroxine (SYNTHROID/LEVOTHROID) 50 MCG tablet TAKE 1 TABLET BY MOUTH ONCE DAILY. 28 tablet 0     magnesium citrate solution Drink entire bottle 6 hours  "prior to procedure  followed by 1 full glass of water 296 mL 0     pantoprazole (PROTONIX) 40 MG EC tablet TAKE 1 TABLET BY MOUTH ONCE DAILY. 28 tablet 5     polyethylene glycol (MIRALAX) 17 GM/Dose powder Mix 8.3 oz bottle miralax and 64 oz Gatorade 6pm day before surgery drink 8oz q 15 mins until gone 238 g 0     QUEtiapine (SEROQUEL) 300 MG tablet TAKE 3 TABLETS BY MOUTH DAILY. 84 tablet 4     simvastatin (ZOCOR) 40 MG tablet TAKE ONE TABLET BY MOUTH AT BEDTIME. 28 tablet 0     VITAMIN D3 25 MCG (1000 UT) tablet TAKE 1 TABLET BY MOUTH ONCE DAILY. 28 tablet 0     Allergies:  The patientis allergic to nickel; penicillins; and seasonal allergies.  .  Social history:  Social History     Tobacco Use     Smoking status: Former Smoker     Packs/day: 0.50     Types: Cigarettes     Quit date: 1993     Years since quittin.0     Smokeless tobacco: Never Used   Substance Use Topics     Alcohol use: No     Marital status: single.      Review of Systems:  10 point review of systems was obtained and negative other than what previously mentioned in HPI    Physical Examination:  /68   Pulse 75   Temp 95.4  F (35.2  C) (Tympanic)   Resp 16   Ht 1.803 m (5' 11\")   Wt 76.7 kg (169 lb)   SpO2 96%   BMI 23.57 kg/m    General: AAOx4, NAD, WN/WD, ambulating without assistance  HEENT:NCAT, EOMI, PERRL Sclerae anicteric; Trachea mideline,   Chest:  No acute distress, CTAB    Cardiac:  regular rate and rhythm, no murmur   Abdomen: there are external hemorrhoidal skin tags, enlarged prostate on KELLY, no mass.   Extremities: Cursory exam unremarkable.  Skin: Warm, dry,   Neuro: no focal deficit,   Psych: happy, calm, asks appropriate questions      Assessment/Plan:  76 y.o. male with rectal bleeding and change in stool habits will plan on colonoscopy.   Satisfactory candidate for colonoscopy.  The indications, risks, benefits and technical aspects of whole colon colonoscopy were outlined with risks including, but not " limited to, perforation, bleeding and inability to visualize entire colon.  Management of each was reviewed.  The need of mechanical preparation of the colon was reviewed along with the use of monitored anesthetic care.  The patient's questions were asked and answered.      Silvio Guzmán MD

## 2021-05-24 ENCOUNTER — ANESTHESIA (OUTPATIENT)
Dept: SURGERY | Facility: HOSPITAL | Age: 76
End: 2021-05-24
Payer: MEDICARE

## 2021-05-24 ENCOUNTER — HOSPITAL ENCOUNTER (OUTPATIENT)
Facility: HOSPITAL | Age: 76
Discharge: HOME | End: 2021-05-24
Attending: SURGERY | Admitting: SURGERY
Payer: MEDICARE

## 2021-05-24 VITALS
WEIGHT: 170 LBS | RESPIRATION RATE: 18 BRPM | HEIGHT: 71 IN | SYSTOLIC BLOOD PRESSURE: 162 MMHG | TEMPERATURE: 97.7 F | HEART RATE: 60 BPM | DIASTOLIC BLOOD PRESSURE: 90 MMHG | OXYGEN SATURATION: 96 % | BODY MASS INDEX: 23.8 KG/M2

## 2021-05-24 DIAGNOSIS — K62.5 BRIGHT RED BLOOD PER RECTUM: ICD-10-CM

## 2021-05-24 PROCEDURE — 43239 EGD BIOPSY SINGLE/MULTIPLE: CPT | Performed by: NURSE ANESTHETIST, CERTIFIED REGISTERED

## 2021-05-24 PROCEDURE — 272N000001 HC OR GENERAL SUPPLY STERILE: Performed by: SURGERY

## 2021-05-24 PROCEDURE — 88305 TISSUE EXAM BY PATHOLOGIST: CPT | Mod: TC | Performed by: SURGERY

## 2021-05-24 PROCEDURE — 710N000012 HC RECOVERY PHASE 2, PER MINUTE: Performed by: SURGERY

## 2021-05-24 PROCEDURE — 360N000075 HC SURGERY LEVEL 2, PER MIN: Performed by: SURGERY

## 2021-05-24 PROCEDURE — 99100 ANES PT EXTEME AGE<1 YR&>70: CPT | Performed by: NURSE ANESTHETIST, CERTIFIED REGISTERED

## 2021-05-24 PROCEDURE — 250N000011 HC RX IP 250 OP 636: Performed by: NURSE ANESTHETIST, CERTIFIED REGISTERED

## 2021-05-24 PROCEDURE — 250N000009 HC RX 250: Performed by: SURGERY

## 2021-05-24 PROCEDURE — 250N000009 HC RX 250: Performed by: NURSE ANESTHETIST, CERTIFIED REGISTERED

## 2021-05-24 PROCEDURE — 43239 EGD BIOPSY SINGLE/MULTIPLE: CPT | Performed by: SURGERY

## 2021-05-24 PROCEDURE — 999N000141 HC STATISTIC PRE-PROCEDURE NURSING ASSESSMENT: Performed by: SURGERY

## 2021-05-24 PROCEDURE — 258N000003 HC RX IP 258 OP 636: Performed by: NURSE ANESTHETIST, CERTIFIED REGISTERED

## 2021-05-24 PROCEDURE — 370N000017 HC ANESTHESIA TECHNICAL FEE, PER MIN: Performed by: SURGERY

## 2021-05-24 RX ORDER — LIDOCAINE HYDROCHLORIDE 20 MG/ML
INJECTION, SOLUTION INFILTRATION; PERINEURAL PRN
Status: DISCONTINUED | OUTPATIENT
Start: 2021-05-24 | End: 2021-05-24 | Stop reason: HOSPADM

## 2021-05-24 RX ORDER — PROPOFOL 10 MG/ML
INJECTION, EMULSION INTRAVENOUS PRN
Status: DISCONTINUED | OUTPATIENT
Start: 2021-05-24 | End: 2021-05-24 | Stop reason: HOSPADM

## 2021-05-24 RX ORDER — SODIUM CHLORIDE, SODIUM LACTATE, POTASSIUM CHLORIDE, CALCIUM CHLORIDE 600; 310; 30; 20 MG/100ML; MG/100ML; MG/100ML; MG/100ML
INJECTION, SOLUTION INTRAVENOUS CONTINUOUS PRN
Status: DISCONTINUED | OUTPATIENT
Start: 2021-05-24 | End: 2021-05-24 | Stop reason: HOSPADM

## 2021-05-24 RX ADMIN — PROPOFOL 10 MG: 10 INJECTION, EMULSION INTRAVENOUS at 09:21

## 2021-05-24 RX ADMIN — SODIUM CHLORIDE, POTASSIUM CHLORIDE, SODIUM LACTATE AND CALCIUM CHLORIDE: 600; 310; 30; 20 INJECTION, SOLUTION INTRAVENOUS at 08:50

## 2021-05-24 RX ADMIN — PROPOFOL 20 MG: 10 INJECTION, EMULSION INTRAVENOUS at 09:17

## 2021-05-24 RX ADMIN — PROPOFOL 40 MG: 10 INJECTION, EMULSION INTRAVENOUS at 09:11

## 2021-05-24 RX ADMIN — PROPOFOL 10 MG: 10 INJECTION, EMULSION INTRAVENOUS at 09:24

## 2021-05-24 RX ADMIN — PROPOFOL 10 MG: 10 INJECTION, EMULSION INTRAVENOUS at 09:27

## 2021-05-24 RX ADMIN — PROPOFOL 10 MG: 10 INJECTION, EMULSION INTRAVENOUS at 09:19

## 2021-05-24 RX ADMIN — LIDOCAINE HYDROCHLORIDE 40 MG: 20 INJECTION, SOLUTION INFILTRATION; PERINEURAL at 09:11

## 2021-05-24 RX ADMIN — PROPOFOL 10 MG: 10 INJECTION, EMULSION INTRAVENOUS at 09:14

## 2021-05-24 ASSESSMENT — MIFFLIN-ST. JEOR: SCORE: 1513.24

## 2021-05-24 NOTE — OR NURSING
Patient and responsible adult given discharge instructions with no questions regarding instructions. Davina score 18. Pain level 0/10.  Discharged from unit via ambulatory. Patient discharged to home.

## 2021-05-24 NOTE — OP NOTE
Christiano Leon MRN# [id number]   YOB: 1945 Age: 76 year old      Date of Admission:  5/24/2021    Primary care provider: ROBERT Akbar    PREOPERATIVE DIAGNOSIS:  History of Alvarenga's esophagus, bright red blood per rectum       POSTOPERATIVE DIAGNOSES:   Hiatal hernia, changes consistent with alvarenga's, mild duodenitis, aborted colon due to poor prep.       PROCEDURE:  Esophagogastroduodenoscopy with cold forceps biopsies. Aborted colonoscopy.       HISTORY OF PRESENT ILLNESS:  See clinic note.      OPERATIVE FINDINGS:  The gastroesophageal junction was noted 36 cm from the incisors. There was a moderate size hiatal hernia, there was mucosal changes consistent with known alvarenga's esophagus. There was mild duodenitis that was biopsied. The colonoscopy was aborted ad 10 cm due to thick brown stool throughout colon.     Specimen(s) submitted to pathology:  Antral biopsies, GE junction biopsies, duodenal bulb biopsies.     PROCEDURE:  With the patient in the supine position on the transport cart, IV sedation was administered by the nurse anesthetist.  The patient's correct identity and planned procedure were confirmed during a requisite timeout pause.  A bite block was placed and the fiberoptic endoscope was introduced and negotiated through the cricopharyngeus without difficulty.  The length of the esophagus was examined without findings.  The gastroesophageal junction was noted 36 cm from the incisors; the Z line was regular without ulceration, bleeding source or stricture.  There was evidence of Alvarenga's change.  The stomach was entered and the pylorus was traversed easily.  The gastric mucosa was normal; there was no evidence of gastric polyp, ulcer or bleeding source.  The duodenum showed some regenerative changes consistent with mild duodenitis in the bulb.  The endoscope was returned to the body of the stomach and retroflex showed hiatal hernia.     Random cold forceps biopsies were obtained of the  antrum for H. pylori.  Hemostasis was judged adequate on visualization.   The endoscope was returned to the GE junction.  Circumferential biopsies were obtained in the areas noted to have mucosal changes; hemostasis was satisfactory.  A second circumferential examination of the esophagus was performed on slow withdrawal of the endoscope without additional finding.            Our attention then turned to the colonoscopy. He was noted to have thick brown stool coming out. Anal verge was without abnormality. No mass on KELLY. Did abort colon due to stool throughout.   The procedure was satisfactorially tolerated; there was no appreciable blood loss and the patient was returned to Day Surgery in good condition.      Silvio Guzmán MD  5/24/2021  9:41 AM

## 2021-05-24 NOTE — DISCHARGE INSTRUCTIONS
INSTRUCTIONS AFTER COLONOSCOPY    WHEN YOU ARE BACK HOME:    Plan to rest for an hour or two after you get home.    You may have some cramping or pressure until you pass gas.    You may resume your regular medications.    Eat a small, light meal at first, and then gradually return to normal meal sizes.    You will be contacted the next day to see how you are doing.  If you had a polyp removed:    Slight bleeding may occur.  You may have a slight blood stain on the toilet paper after a bowel movement.    To lessen the chance of bleeding, avoid heavy exercise for ONE WEEK.  This includes heavy lifting, vigorous sport activities, and heavy physical labor.  You may resume your normal sexual activity.      Avoid aspirin or aspirin products if instructed by your doctor.    If there is a polyp or biopsy, you will be contacted with results within one week.     WHAT TO WATCH FOR:  Problems rarely occur after the exam; however, it is important for you to watch for early signs of possible problems.  If you have     Unusual pain in your abdomen    Nausea and vomiting that persists    Excessive bleeding    Black or bloody bowel movements    Fever or temperature above 100.6 F  Please call your doctor ([facility] [phone]) or go to the nearest hospital emergency room.    Post-Anesthesia Patient Instructions    IMMEDIATELY FOLLOWING SURGERY:  Do not drive or operate machinery for the first twenty four hours after surgery.  Do not make any important decisions for twenty four hours after surgery or while taking narcotic pain medications or sedatives.  If you develop intractable nausea and vomiting or a severe headache please notify your doctor immediately.    FOLLOW-UP:  Please make an appointment with your surgeon as instructed. You do not need to follow up with anesthesia unless specifically instructed to do so.    WOUND CARE INSTRUCTIONS (if applicable):  Keep a dry clean dressing on the anesthesia/puncture wound site  if there is drainage.  Once the wound has quit draining you may leave it open to air.  Generally you should leave the bandage intact for twenty four hours unless there is drainage.  If the epidural site drains for more than 36-48 hours please call the anesthesia department.    QUESTIONS?:  Please feel free to call your physician or the hospital  if you have any questions, and they will be happy to assist you.             UPPER ENDOSCOPY    AFTER THE PROCEDURE    You will return to Same Day Surgery to rest for about an hour before you go home.    The doctor will talk with you and your family.    A family member/friend may visit with you.    You may burp up any air remaining in your stomach.    You may feel dizzy or light-headed from the medicine.    Your nurse will go over the discharge instructions with you and your caregiver and answer any of your questions.      You will be contacted the next day to check on how you are doing.    If biopsies were taken, you will be contacted with the results usually within 3 days.  BACK AT HOME    Rest for an hour or two after you get home.    When your throat is no longer numb and you have a gag reflex, take a few sips of cool water.  If you can swallow comfortably, you may start eating again.    You may have a mild sore throat for the rest of the day.    You will be contacted with results by the surgeons office within a week. If not contacted in one week, call the surgeons office.  WHAT TO WATCH FOR:  Problems rarely occur after the exam, but it is important to be aware of the early signs of a complication.  Call your doctor immediately if you have:    Difficulty swallowing or breathing    Unusual pain in your stomach or chest    Vomiting blood or dark material that looks like coffee grounds    Black or tarry stools    Temperature over 101.5 degrees    MORE QUESTIONS?  Please ask your doctor or nurse before the exam begins  or call your doctor at the clinic.    IF YOU  MUST CANCEL YOUR PROCEDURE THE EVENING/NIGHT BEFORE, PLEASE CALL HOSPITAL ADMITTING -[phone] OR TOLL FREE [phone], EXT. [phone].    Phone Numbers:  [facility] - [phone] [phone]  [facility] - [phone]  Surgery Patient Education - [phone] or toll free [phone]

## 2021-05-24 NOTE — ANESTHESIA PREPROCEDURE EVALUATION
Anesthesia Pre-Procedure Evaluation    Patient: Christiano Leon   MRN: [id number] : 1945        Preoperative Diagnosis: Bright red blood per rectum [K62.5]   Procedure : Procedure(s):  colonoscopy and upper endoscopy     Past Medical History:   Diagnosis Date     BPH (benign prostatic hyperplasia)      Poor dentition       Past Surgical History:   Procedure Laterality Date     Bilateral cataract       ESOPHAGOSCOPY, GASTROSCOPY, DUODENOSCOPY (EGD), COMBINED N/A 5/10/2019    Procedure: UPPER ENDOSCOPY WITH BIOPSIES;  Surgeon: Silvio Guzmán MD;  Location: [facility]     HERNIA REPAIR Right 2007?     Right knee meniscectomy Right       Allergies   Allergen Reactions     Nickel      Possible. Cheap metal.     Penicillins      Seasonal Allergies      Perfumes      Social History     Tobacco Use     Smoking status: Former Smoker     Packs/day: 0.50     Types: Cigarettes     Quit date: 1993     Years since quittin.0     Smokeless tobacco: Never Used   Substance Use Topics     Alcohol use: No      Wt Readings from Last 1 Encounters:   21 77.1 kg (170 lb)        Anesthesia Evaluation            ROS/MED HX  ENT/Pulmonary:       Neurologic:       Cardiovascular:       METS/Exercise Tolerance:     Hematologic:       Musculoskeletal:       GI/Hepatic:     (+) GERD, Asymptomatic on medication,     Renal/Genitourinary:       Endo:     (+) thyroid problem, hypothyroidism,     Psychiatric/Substance Use: Comment: Dementia    (+) alcohol abuse     Infectious Disease:       Malignancy:       Other:            Physical Exam    Airway        Mallampati: II   TM distance: > 3 FB   Neck ROM: full   Mouth opening: > 3 cm    Respiratory Devices and Support         Dental  no notable dental history     (+) chipped      Cardiovascular       Comment: Distant   Rhythm and rate: regular and normal     Pulmonary   pulmonary exam normal        breath sounds clear to auscultation           OUTSIDE LABS:  CBC:   Lab Results    Component Value Date    WBC 5.5 04/29/2021    WBC 6.0 12/21/2020    HGB 13.4 04/29/2021    HGB 12.6 (L) 12/21/2020    HCT 41.4 04/29/2021    HCT 39.1 (L) 12/21/2020     04/29/2021     12/21/2020     BMP:   Lab Results   Component Value Date     04/29/2021     12/21/2020    POTASSIUM 4.1 04/29/2021    POTASSIUM 3.8 12/21/2020    CHLORIDE 110 (H) 04/29/2021    CHLORIDE 109 12/21/2020    CO2 29 04/29/2021    CO2 26 12/21/2020    BUN 19 04/29/2021    BUN 11 12/21/2020    CR 1.00 04/29/2021    CR 0.89 12/21/2020    GLC 99 04/29/2021    GLC 91 12/21/2020     COAGS: No results found for: PTT, INR, FIBR  POC: No results found for: BGM, HCG, HCGS  HEPATIC:   Lab Results   Component Value Date    ALBUMIN 3.6 04/29/2021    PROTTOTAL 7.3 04/29/2021    ALT 20 04/29/2021    AST 12 04/29/2021    ALKPHOS 80 04/29/2021    BILITOTAL 0.3 04/29/2021     OTHER:   Lab Results   Component Value Date    A1C 5.8 (H) 09/11/2018    GAYLE 8.8 04/29/2021    MAG 2.1 12/21/2020    TSH 1.52 04/29/2021    CRP 4.4 03/02/2018    SED 11 03/02/2018       Anesthesia Plan    ASA Status:  3   NPO Status:  NPO Appropriate    Anesthesia Type: MAC.     - Reason for MAC: straight local not clinically adequate   Induction: Propofol.           Consents    Anesthesia Plan(s) and associated risks, benefits, and realistic alternatives discussed. Questions answered and patient/representative(s) expressed understanding.     - Discussed with:  Patient         Postoperative Care            Comments:                ZANE Edwards CRNA

## 2021-05-24 NOTE — ANESTHESIA POSTPROCEDURE EVALUATION
Patient: Christiano Leon    Procedure(s):  colonoscopy attempted and upper endoscopy with biopsies    Diagnosis:Bright red blood per rectum [K62.5]  Diagnosis Additional Information: No value filed.    Anesthesia Type:  MAC    Note:  Disposition: Outpatient   Postop Pain Control: Uneventful            Sign Out: Well controlled pain   PONV: No   Neuro/Psych: Uneventful            Sign Out: Acceptable/Baseline neuro status   Airway/Respiratory: Uneventful            Sign Out: Acceptable/Baseline resp. status   CV/Hemodynamics: Uneventful            Sign Out: Acceptable CV status; No obvious hypovolemia; No obvious fluid overload   Other NRE: NONE   DID A NON-ROUTINE EVENT OCCUR? No           Last vitals:  Vitals:    05/24/21 0950 05/24/21 0955 05/24/21 1000   BP: 150/88 153/87 145/86   Pulse: 57 54 55   Resp: 16  16   Temp:      SpO2: 97% 96% 96%       Last vitals prior to Anesthesia Care Transfer:  CRNA VITALS  5/24/2021 0859 - 5/24/2021 0959      5/24/2021             Pulse:  58    Ht Rate:  58    SpO2:  94 %    Resp Rate (set):  8          Electronically Signed By: ZANE Patel CRNA  May 24, 2021  10:21 AM

## 2021-05-25 NOTE — ADDENDUM NOTE
Addendum  created 05/25/21 0814 by Scout Tse APRN CRNA    Intraprocedure Event edited, Intraprocedure Staff edited

## 2021-05-26 LAB — COPATH REPORT: NORMAL

## 2021-06-02 ENCOUNTER — PREP FOR PROCEDURE (OUTPATIENT)
Dept: SURGERY | Facility: OTHER | Age: 76
End: 2021-06-02

## 2021-06-02 ENCOUNTER — TELEPHONE (OUTPATIENT)
Dept: SURGERY | Facility: OTHER | Age: 76
End: 2021-06-02

## 2021-06-02 DIAGNOSIS — K62.5 BRIGHT RED BLOOD PER RECTUM: ICD-10-CM

## 2021-06-02 DIAGNOSIS — Z12.11 SCREENING FOR COLON CANCER: Primary | ICD-10-CM

## 2021-06-02 DIAGNOSIS — Z01.818 PREOP TESTING: Primary | ICD-10-CM

## 2021-06-02 RX ORDER — BISACODYL 5 MG
TABLET, DELAYED RELEASE (ENTERIC COATED) ORAL
Qty: 4 TABLET | Refills: 0 | Status: SHIPPED | OUTPATIENT
Start: 2021-06-02 | End: 2021-06-02

## 2021-06-02 RX ORDER — BISACODYL 5 MG
TABLET, DELAYED RELEASE (ENTERIC COATED) ORAL
Qty: 4 TABLET | Refills: 0 | Status: ON HOLD | OUTPATIENT
Start: 2021-06-02 | End: 2021-07-22

## 2021-06-02 NOTE — TELEPHONE ENCOUNTER
Please sign RX in this encounter.     Per , patient needs to have repeat colonoscopy due to poor bowel prep. Patient was called at nursing home. Patient will be having procedure on 6/24/21 with  and will be using Golytely bowel prep.   Patient requests phone call to nurses to give instructions, they will also be mailed.   Patients covid testing is scheduled for 6/20/21. Estrellita Hernandez LPN

## 2021-06-02 NOTE — TELEPHONE ENCOUNTER
Fax received from [facility] pharmacy indicating that his bowel prep can not be filled as he is not allowed in the building, requesting RX to a different pharmacy.    Attempted to call patient many times, but he is not answering and does not have voicemail. Left message with nurse at [facility] notifying that his RX was sent to [facility] pharmacy instead as he had that pharmacy listed in his chart as well. She will tell patient.

## 2021-06-02 NOTE — TELEPHONE ENCOUNTER
Attempted to call patients nurses to give them colonoscopy instructions. Message was left, but instructions will also be mailed. Estrellita Hernandez LPN

## 2021-06-10 ENCOUNTER — TELEPHONE (OUTPATIENT)
Dept: SURGERY | Facility: OTHER | Age: 76
End: 2021-06-10

## 2021-06-10 NOTE — TELEPHONE ENCOUNTER
Message received from Patt at [facility]. [facility] nurses would like this patients surgery instructions and medication orders faxed to them at [phone]. They also requested a call back at [phone]. I attempted to call that phone number and it kept ringing and did not go to an answering machine. Orders and instructions were faxed. Estrellita Hernandez LPN

## 2021-06-17 ENCOUNTER — ANESTHESIA EVENT (OUTPATIENT)
Dept: SURGERY | Facility: HOSPITAL | Age: 76
End: 2021-06-17
Payer: MEDICARE

## 2021-06-17 NOTE — ANESTHESIA PREPROCEDURE EVALUATION
Anesthesia Pre-Procedure Evaluation    Patient: Christiano Leon   MRN: [id number] : 1945        Preoperative Diagnosis: Screening for colon cancer [Z12.11]   Procedure : Procedure(s):  Colonoscopy, possible biopsy, possible polypectomy. Repeat colonoscopy due to incomplete colonoscopy     Past Medical History:   Diagnosis Date     BPH (benign prostatic hyperplasia)      Poor dentition       Past Surgical History:   Procedure Laterality Date     Bilateral cataract       ENDOSCOPY UPPER, COLONOSCOPY, COMBINED N/A 2021    Procedure: colonoscopy attempted and upper endoscopy with biopsies;  Surgeon: Silvio Guzmán MD;  Location: [facility]     ESOPHAGOSCOPY, GASTROSCOPY, DUODENOSCOPY (EGD), COMBINED N/A 5/10/2019    Procedure: UPPER ENDOSCOPY WITH BIOPSIES;  Surgeon: Silvio Guzmán MD;  Location: [facility]     HERNIA REPAIR Right 2007?     Right knee meniscectomy Right       Allergies   Allergen Reactions     Nickel      Possible. Cheap metal.     Penicillins      Seasonal Allergies      Perfumes      Social History     Tobacco Use     Smoking status: Former Smoker     Packs/day: 0.50     Types: Cigarettes     Quit date: 1993     Years since quittin.0     Smokeless tobacco: Never Used   Substance Use Topics     Alcohol use: No      Wt Readings from Last 1 Encounters:   21 77.1 kg (170 lb)        Anesthesia Evaluation   Pt has had prior anesthetic.     No history of anesthetic complications       ROS/MED HX  ENT/Pulmonary:     (+) tobacco use, Past use,     Neurologic:     (+) dementia,     Cardiovascular:     (+) Dyslipidemia -----    METS/Exercise Tolerance: >4 METS    Hematologic:  - neg hematologic  ROS     Musculoskeletal:  - neg musculoskeletal ROS     GI/Hepatic:     (+) GERD, Asymptomatic on medication, bowel prep,     Renal/Genitourinary:     (+) BPH,     Endo:     (+) thyroid problem, hypothyroidism,     Psychiatric/Substance Use: Comment: Dementia    (+) alcohol abuse     Infectious Disease:   - neg infectious disease ROS     Malignancy:  - neg malignancy ROS     Other:            Physical Exam    Airway        Mallampati: III   TM distance: > 3 FB   Neck ROM: full   Mouth opening: > 3 cm    Respiratory Devices and Support         Dental       (+) missing      Cardiovascular   cardiovascular exam normal       Rhythm and rate: regular and normal     Pulmonary   pulmonary exam normal        breath sounds clear to auscultation           OUTSIDE LABS:  CBC:   Lab Results   Component Value Date    WBC 5.5 04/29/2021    WBC 6.0 12/21/2020    HGB 13.4 04/29/2021    HGB 12.6 (L) 12/21/2020    HCT 41.4 04/29/2021    HCT 39.1 (L) 12/21/2020     04/29/2021     12/21/2020     BMP:   Lab Results   Component Value Date     04/29/2021     12/21/2020    POTASSIUM 4.1 04/29/2021    POTASSIUM 3.8 12/21/2020    CHLORIDE 110 (H) 04/29/2021    CHLORIDE 109 12/21/2020    CO2 29 04/29/2021    CO2 26 12/21/2020    BUN 19 04/29/2021    BUN 11 12/21/2020    CR 1.00 04/29/2021    CR 0.89 12/21/2020    GLC 99 04/29/2021    GLC 91 12/21/2020     COAGS: No results found for: PTT, INR, FIBR  POC: No results found for: BGM, HCG, HCGS  HEPATIC:   Lab Results   Component Value Date    ALBUMIN 3.6 04/29/2021    PROTTOTAL 7.3 04/29/2021    ALT 20 04/29/2021    AST 12 04/29/2021    ALKPHOS 80 04/29/2021    BILITOTAL 0.3 04/29/2021     OTHER:   Lab Results   Component Value Date    A1C 5.8 (H) 09/11/2018    GAYLE 8.8 04/29/2021    MAG 2.1 12/21/2020    TSH 1.52 04/29/2021    CRP 4.4 03/02/2018    SED 11 03/02/2018       Anesthesia Plan    ASA Status:  3   NPO Status:  NPO Appropriate    Anesthesia Type: MAC.     - Reason for MAC: straight local not clinically adequate   Induction: Intravenous, Propofol.   Maintenance: Balanced.        Consents    Anesthesia Plan(s) and associated risks, benefits, and realistic alternatives discussed. Questions answered and patient/representative(s) expressed understanding.     -  Discussed with:  Patient      - Extended Intubation/Ventilatory Support Discussed: No.      - Patient is DNR/DNI Status: No    Use of blood products discussed: No .     Postoperative Care            Comments:                ZANE Ramirez CRNA

## 2021-06-21 ENCOUNTER — TELEPHONE (OUTPATIENT)
Dept: SURGERY | Facility: OTHER | Age: 76
End: 2021-06-21

## 2021-06-21 DIAGNOSIS — Z12.11 SCREENING FOR COLON CANCER: Primary | ICD-10-CM

## 2021-06-21 RX ORDER — BISACODYL 5 MG
TABLET, DELAYED RELEASE (ENTERIC COATED) ORAL
Qty: 4 TABLET | Refills: 0 | Status: ON HOLD | OUTPATIENT
Start: 2021-06-21 | End: 2021-07-22

## 2021-06-21 NOTE — TELEPHONE ENCOUNTER
Please sign RX in this encounter.    Patt, nurse at [facility] states that patient can't find his Dulcolax bottle for bowel prep. Patient will need to start this prep tomorrow. Sending new prescription for patient. Estrellita Hernandez LPN

## 2021-06-22 ENCOUNTER — TELEPHONE (OUTPATIENT)
Dept: SURGERY | Facility: OTHER | Age: 76
End: 2021-06-22

## 2021-06-22 NOTE — TELEPHONE ENCOUNTER
This patient was scheduled for colonoscopy with Dr. Guzmán for 6/24, but he no-showed his COVID test and will need to reschedule.     Called DELVIN Beltre at [facility]. She states she will help him with his prep and instructions. Rescheduled colonoscopy to 7/22/2021 with COVID test. Nell in OR notified.    Patt would like new instructions faxed to [phone].

## 2021-06-22 NOTE — LETTER
June 23, 2021      Derrick Wood  [address]  [facility] [address]      Dear Christiano,       We want your Colonoscopy to be as pleasant as possible. Please review the instructions below. If you have questions, you may contact us at any of the following numbers:     [facility] Coordinator: [phone]  Clinic Nurse (Eva): [phone]  Surgery Education Nurse: [phone]      Date of Procedure:  7/22/2021 with Dr. Guzmán  Admit time: Surgery Department will call you the day before your procedure by 5pm with your admit time. If your surgery is on Monday, expect a call on Friday.  If you are not contacted by 5PM, please call admitting at [phone].   After hours or on weekends, call [phone] to postpone.   Call the clinic nurse if you become ill within 2 weeks of your procedure to reschedule.     COVID-19 test is needed 7-5 days before procedure. This testing is done at the upper level of the [facility] (weekdays and weekends-[facility] Entrance) or at the [facility] (weekday mornings only).  Follow the signage for parking and bring your mobile phone (if you have one) to call the phone number ([phone]) on the sign outside the testing site for check-in. Stay in your vehicle until you are directed to enter. If you do not have a cell phone, please call the nurse for instructions on checking in.   This has been scheduled for Sunday 7/18/2021 at 10:15 at the [facility] site.        7 DAYS BEFORE THE EXAM:     7/15/2021  Fill your prescription(s) at the pharmacy as soon as possible. (call ahead if more than 1 week)  Call the Surgery Education Nurse at [phone] and have a medication list ready.  No Aspirin or NSAIDS (Ibuprofen, Celebrex, Naproxen, etc) 7 days before surgery.   Stop fiber supplements, vitamins, iron and herbals. Do not eat corn, nuts or seeds.      2 DAYS BEFORE THE EXAM:     7/20/2021   Low fiber diet. Nothing red or purple.  See table at end of instructions  for list of low fiber foods.   Drink 4-6 large glasses of sports drink.          AT BEDTIME: take 2 Dulcolax tablets.     1 DAY BEFORE THE EXAM:     7/21/2021  No solid foods or milk products after 12:00 am, midnight.   Drink only clear liquids all day-at least 8-10 glasses including 4-6 glasses sports drink.   See table at end of instructions for list of clear liquids. No red, purple, or alcohol.         AT 3:00 PM: Take 2 Dulcolax tablets.         AT 6:00 PM: Drink one 8 oz. glass of Golytely every 10-15 minutes until the jug is half empty. Drink each glass quickly. Store the rest in the refrigerator. Stay near a toilet.      DAY OF COLONOSCOPY:     7/22/2021           6 HOURS PRIOR TO THE EXAM (set an alarm):  Drink the other half of the Golytely jug-one 8 oz glass every 10-15 minutes until gone.   You may have clear liquids up until 2 hours before your exam and then nothing by mouth.   If you must take medicine, you may take it with a sip of water.   Shower before arrival and wear clean,comfortable clothes.   No jewelry, make-up, nail polish, hair spray, lotions, or perfumes.   [place] in Admitting through the [facility].  You must have a responsible adult available to drive and stay with you for 4 hours at home or you will be cancelled.             Low Fiber Diet    You may have Do NOT have   Starches:        White breads (tortillas, biscuits, toast, pancakes, waffles, etc.), white rice, pasta (not whole grain), cooked and peeled potatoes, plain or saltine crackers, cooked farina or cream of rice, puffed rice, corn flakes, Rice Krispies, Special K.   Starches:       Whole grain breads; Breads containing nuts, seeds or fruit, or more than 1 gram fiber per slice, cornbread, corn or whole wheat tortillas, potatoes with skin, brown rice, wild rice, kasha/buckwheat.   Vegetables:       Tender, well cooked and canned vegetables without seeds or peels including carrots, asparagus tips, green beans, wax  beans,  spinach; vegetable broth Vegetables:       Any raw or steamed vegetables, vegetables with seeds, corn in any form   Fruits/Juices:        Strained fruit juice, canned fruit without seeds or skin (not pineapple,) applesauce, pear, ripe bananas, melons (not watermelon) Fruit/Juices:        Prunes, prune juice, raisins or other dried fruits, berries and other fruits with seeds, pineapple, fresh or frozen fruits not listed in other column   Milk Products:       Milk, cheese, cottage cheese, yogurt without berries, custard, ice cream without nuts/fruit Milk Products:       Any dairy product with nuts, seeds or berries   Proteins:       Tender, well-cooked ground beef, lamb, veal, ham, pork, chicken, turkey, fish or organ meats; eggs, creamy peanut butter Proteins:        Tough, fibrous meats with gristle, cooked dried beans, peas or lentils, crunchy peanut butter          Fats and condiments:        Margarine, butter, oils, valdez, sour cream, salad dressing, plain gravy, spices, cooked herbs, sugar, clear jelly, honey, syrup Fats and condiments:        Pickles, olives, relish, horseradish, jam, marmalade, preserves   Snacks, sweets and drinks:       Pretzels, hard candy, plain cakes and cookies without nuts or seeds, gelatin, plain pudding, sherbet, popsicles, coffee, tea, carbonated beverages Snacks, sweets and drinks:       Popcorn, nuts, seeds, granola, coconut, candies or desserts with nuts/seeds and raisins/dried fruits or whole grains.               Clear Liquid Diet  You may have: Do NOT have:     ? Tea, coffee (no cream)   Milk or milk products such as ice cream, malts or shakes     ? Water, Vitamin Water, Smart Water, Coconut Water, PowerAde, Propel, Soda pop, (Sprite, 7 UP, Ginger Ale, Gatorade (not red or purple)   Red or purple drinks of any kind such as  Cranberry juice or grape juice.     ? Clear nutrition drinks (Resource Breeze, Ensure Active protein drink (peach flavor)   Red or purple Jell-O, Popsicles,  Gerber-Aid, Sorbet and candy.     ? Jell-O, Popsicles (no milk or fruit pieces) or Italian ice (not red or purple)   Juices with pulp such as orange, grapefruit, pineapple or tomato juice     ? Water, Vitamin Water, Smart Water, Coconut Water   Cream soups of any kind     ? Fat-free soup broth or bouillon   Alcohol     ? Plain hard candy, such as clear Life Savers (not red or purple)      ? Powdered Lemonade such as Crystal Light, Country Time      ? Clear juices and fruit-flavored drinks such as apple juice, white grape juice, Hi-C and Gerber-Aid (not red or purple)      ? Honey / Sugar            TIPS FOR COLON CLEANSING BEFORE YOUR COLONOSCOPY  To get accurate results from your exam, your colon must be completely empty or you may need to repeat the colon prep and exam. If you followed instructions and your stool is clear or yellow liquid, you are ready. If you are not sure if your colon is clean, please call the clinic nurse.    You may use Tucks wipes, hemorrhoid treatments, hydrocortisone cream or alcohol-free baby wipes to ease anal irritation. You may also use Vaseline to help protect the skin.     You can squeeze some lemon juice or add a packet of Crystal Light lemon-lime or ice tea flavor into your preparation. You may try sucking on hard candy or a lemon or lime wedge; or washing your mouth out with water, clear soda or mouthwash.    To chill the solution, put it in your refrigerator or set it in a bowl of ice. Do not add ice in your glass. Remove from the refrigerator 15 minutes before drinking.    Quickly drink each glass. It may help to use a timer. If the liquid is too salty, use a straw. Even when you are sitting on the toilet, keep drinking every 10-15 minutes. If you have nausea or vomiting, take a break for 15 to 30 minutes and then resume drinking.    You will have loose watery stools and may also have chills. Dress for comfort. Expect to feel bloating, nausea and other discomfort until the stool  clears from your colon.       Sincerely,      Silvio Guzmán MD/Eva PEMBERTON LPN

## 2021-06-23 NOTE — TELEPHONE ENCOUNTER
Discussed with Dr. Guzmán. No preop needed. Instruction letter faxed to DELVIN Beltre at [place] at [phone] as requested.

## 2021-06-24 ENCOUNTER — ANESTHESIA (OUTPATIENT)
Dept: SURGERY | Facility: HOSPITAL | Age: 76
End: 2021-06-24
Payer: MEDICARE

## 2021-07-15 ASSESSMENT — LIFESTYLE VARIABLES: TOBACCO_USE: 1

## 2021-07-18 ENCOUNTER — OFFICE VISIT (OUTPATIENT)
Dept: FAMILY MEDICINE | Facility: OTHER | Age: 76
End: 2021-07-18
Attending: FAMILY MEDICINE
Payer: MEDICARE

## 2021-07-18 DIAGNOSIS — Z01.818 PRE-OP EXAM: Primary | ICD-10-CM

## 2021-07-18 LAB — SARS-COV-2 RNA RESP QL NAA+PROBE: NEGATIVE

## 2021-07-18 PROCEDURE — U0005 INFEC AGEN DETEC AMPLI PROBE: HCPCS | Mod: ZL

## 2021-07-22 ENCOUNTER — HOSPITAL ENCOUNTER (OUTPATIENT)
Facility: HOSPITAL | Age: 76
Discharge: HOME | End: 2021-07-22
Attending: SURGERY | Admitting: SURGERY
Payer: MEDICARE

## 2021-07-22 VITALS
HEIGHT: 71 IN | WEIGHT: 163.6 LBS | DIASTOLIC BLOOD PRESSURE: 73 MMHG | OXYGEN SATURATION: 96 % | HEART RATE: 56 BPM | TEMPERATURE: 96.9 F | RESPIRATION RATE: 18 BRPM | BODY MASS INDEX: 22.9 KG/M2 | SYSTOLIC BLOOD PRESSURE: 138 MMHG

## 2021-07-22 DIAGNOSIS — Z12.11 SCREENING FOR COLON CANCER: ICD-10-CM

## 2021-07-22 PROCEDURE — 999N000141 HC STATISTIC PRE-PROCEDURE NURSING ASSESSMENT: Performed by: SURGERY

## 2021-07-22 PROCEDURE — 370N000017 HC ANESTHESIA TECHNICAL FEE, PER MIN: Performed by: SURGERY

## 2021-07-22 PROCEDURE — G0121 COLON CA SCRN NOT HI RSK IND: HCPCS | Performed by: SURGERY

## 2021-07-22 PROCEDURE — 258N000003 HC RX IP 258 OP 636: Performed by: NURSE ANESTHETIST, CERTIFIED REGISTERED

## 2021-07-22 PROCEDURE — 360N000075 HC SURGERY LEVEL 2, PER MIN: Performed by: SURGERY

## 2021-07-22 PROCEDURE — 45378 DIAGNOSTIC COLONOSCOPY: CPT | Performed by: NURSE ANESTHETIST, CERTIFIED REGISTERED

## 2021-07-22 PROCEDURE — 99100 ANES PT EXTEME AGE<1 YR&>70: CPT | Performed by: NURSE ANESTHETIST, CERTIFIED REGISTERED

## 2021-07-22 PROCEDURE — 250N000011 HC RX IP 250 OP 636: Performed by: NURSE ANESTHETIST, CERTIFIED REGISTERED

## 2021-07-22 PROCEDURE — 250N000013 HC RX MED GY IP 250 OP 250 PS 637: Performed by: CLINICAL NURSE SPECIALIST

## 2021-07-22 PROCEDURE — 710N000012 HC RECOVERY PHASE 2, PER MINUTE: Performed by: SURGERY

## 2021-07-22 PROCEDURE — 250N000009 HC RX 250: Performed by: NURSE ANESTHETIST, CERTIFIED REGISTERED

## 2021-07-22 RX ORDER — LIDOCAINE HYDROCHLORIDE 20 MG/ML
INJECTION, SOLUTION INFILTRATION; PERINEURAL PRN
Status: DISCONTINUED | OUTPATIENT
Start: 2021-07-22 | End: 2021-07-22

## 2021-07-22 RX ORDER — SODIUM CHLORIDE, SODIUM LACTATE, POTASSIUM CHLORIDE, CALCIUM CHLORIDE 600; 310; 30; 20 MG/100ML; MG/100ML; MG/100ML; MG/100ML
INJECTION, SOLUTION INTRAVENOUS CONTINUOUS
Status: DISCONTINUED | OUTPATIENT
Start: 2021-07-22 | End: 2021-07-22 | Stop reason: HOSPADM

## 2021-07-22 RX ORDER — ONDANSETRON 4 MG/1
4 TABLET, ORALLY DISINTEGRATING ORAL EVERY 30 MIN PRN
Status: DISCONTINUED | OUTPATIENT
Start: 2021-07-22 | End: 2021-07-22 | Stop reason: HOSPADM

## 2021-07-22 RX ORDER — NALOXONE HYDROCHLORIDE 0.4 MG/ML
0.4 INJECTION, SOLUTION INTRAMUSCULAR; INTRAVENOUS; SUBCUTANEOUS
Status: DISCONTINUED | OUTPATIENT
Start: 2021-07-22 | End: 2021-07-22

## 2021-07-22 RX ORDER — NALOXONE HYDROCHLORIDE 0.4 MG/ML
0.2 INJECTION, SOLUTION INTRAMUSCULAR; INTRAVENOUS; SUBCUTANEOUS
Status: DISCONTINUED | OUTPATIENT
Start: 2021-07-22 | End: 2021-07-22

## 2021-07-22 RX ORDER — FLUMAZENIL 0.1 MG/ML
0.2 INJECTION, SOLUTION INTRAVENOUS
Status: DISCONTINUED | OUTPATIENT
Start: 2021-07-22 | End: 2021-07-22 | Stop reason: HOSPADM

## 2021-07-22 RX ORDER — LIDOCAINE 40 MG/G
CREAM TOPICAL
Status: DISCONTINUED | OUTPATIENT
Start: 2021-07-22 | End: 2021-07-22 | Stop reason: HOSPADM

## 2021-07-22 RX ORDER — PROPOFOL 10 MG/ML
INJECTION, EMULSION INTRAVENOUS PRN
Status: DISCONTINUED | OUTPATIENT
Start: 2021-07-22 | End: 2021-07-22

## 2021-07-22 RX ORDER — NALOXONE HYDROCHLORIDE 0.4 MG/ML
0.4 INJECTION, SOLUTION INTRAMUSCULAR; INTRAVENOUS; SUBCUTANEOUS
Status: DISCONTINUED | OUTPATIENT
Start: 2021-07-22 | End: 2021-07-22 | Stop reason: HOSPADM

## 2021-07-22 RX ORDER — NALOXONE HYDROCHLORIDE 0.4 MG/ML
0.2 INJECTION, SOLUTION INTRAMUSCULAR; INTRAVENOUS; SUBCUTANEOUS
Status: DISCONTINUED | OUTPATIENT
Start: 2021-07-22 | End: 2021-07-22 | Stop reason: HOSPADM

## 2021-07-22 RX ORDER — MEPERIDINE HYDROCHLORIDE 25 MG/ML
12.5 INJECTION INTRAMUSCULAR; INTRAVENOUS; SUBCUTANEOUS
Status: DISCONTINUED | OUTPATIENT
Start: 2021-07-22 | End: 2021-07-22 | Stop reason: HOSPADM

## 2021-07-22 RX ORDER — ONDANSETRON 2 MG/ML
4 INJECTION INTRAMUSCULAR; INTRAVENOUS EVERY 30 MIN PRN
Status: DISCONTINUED | OUTPATIENT
Start: 2021-07-22 | End: 2021-07-22 | Stop reason: HOSPADM

## 2021-07-22 RX ADMIN — PROPOFOL 50 MG: 10 INJECTION, EMULSION INTRAVENOUS at 15:00

## 2021-07-22 RX ADMIN — PROPOFOL 50 MG: 10 INJECTION, EMULSION INTRAVENOUS at 15:07

## 2021-07-22 RX ADMIN — SODIUM CHLORIDE, POTASSIUM CHLORIDE, SODIUM LACTATE AND CALCIUM CHLORIDE: 600; 310; 30; 20 INJECTION, SOLUTION INTRAVENOUS at 14:53

## 2021-07-22 RX ADMIN — LIDOCAINE HYDROCHLORIDE 40 MG: 20 INJECTION, SOLUTION INFILTRATION; PERINEURAL at 15:00

## 2021-07-22 RX ADMIN — SODIUM PHOSPHATE, DIBASIC AND SODIUM PHOSPHATE, MONOBASIC 1 ENEMA: 7; 19 ENEMA RECTAL at 11:28

## 2021-07-22 RX ADMIN — SODIUM CHLORIDE, POTASSIUM CHLORIDE, SODIUM LACTATE AND CALCIUM CHLORIDE: 600; 310; 30; 20 INJECTION, SOLUTION INTRAVENOUS at 11:27

## 2021-07-22 RX ADMIN — PROPOFOL 30 MG: 10 INJECTION, EMULSION INTRAVENOUS at 15:13

## 2021-07-22 RX ADMIN — PROPOFOL 50 MG: 10 INJECTION, EMULSION INTRAVENOUS at 15:03

## 2021-07-22 ASSESSMENT — MIFFLIN-ST. JEOR: SCORE: 1484.21

## 2021-07-22 NOTE — ANESTHESIA CARE TRANSFER NOTE
Patient: Christiano Leon    Procedure(s):  Colonoscopy;  Repeat colonoscopy due to incomplete colonoscopy    Diagnosis: Screening for colon cancer [Z12.11]  Diagnosis Additional Information: No value filed.    Anesthesia Type:   MAC     Note:      Level of Consciousness: drowsy  Oxygen Supplementation: nasal cannula  Level of Supplemental Oxygen (L/min / FiO2): 2  Independent Airway: airway patency satisfactory and stable  Dentition: dentition unchanged  Vital Signs Stable: post-procedure vital signs reviewed and stable  Report to RN Given: handoff report given  Patient transferred to: Phase II    Handoff Report: Identifed the Patient, Identified the Reponsible Provider, Reviewed the pertinent medical history, Discussed the surgical course, Reviewed Intra-OP anesthesia mangement and issues during anesthesia, Set expectations for post-procedure period and Allowed opportunity for questions and acknowledgement of understanding      Vitals:  Vitals Value Taken Time   BP     Temp     Pulse     Resp     SpO2 95 % 07/22/21 1523   Vitals shown include unvalidated device data.    Electronically Signed By: ZANE Ramirez CRNA  July 22, 2021  3:24 PM

## 2021-07-22 NOTE — DISCHARGE INSTRUCTIONS

## 2021-07-22 NOTE — H&P
Surgery Consult Clinic Note      RE: Christiano Leon  : 1945      Chief Complaint:  Bright red blood per rectum  Constipation    History of Present Illness:  Dr. Guzmán originally saw Mr. Leon on 2021 for evaluation regarding bright red blood per rectum and consideration for screening colonoscopy.  Please refer to that note for further detail.  He had an abandoned colonoscopy on 2021 due to incomplete bowel prep.  He reports the blood in stool and constipation have improved since the last colonoscopy attempt.  He is here today to update his H&P.  Chrisitano is scheduled for colonoscopy on 2021, which is outside the 30 day anesthesia clearance guidelines.  He has no questions regarding  bowel prep.  Reports passing cloudy, yellow liquid stools today, therefore we will administer a fleets enema.  He specifically denies fevers, chills, nausea, vomiting, chest pain, shortness of breath, palpitations, sore throat, cough, or generalized feeling ill.      Medical history:  Past Medical History:   Diagnosis Date     BPH (benign prostatic hyperplasia)      Poor dentition        Surgical history:  Past Surgical History:   Procedure Laterality Date     Bilateral cataract       ENDOSCOPY UPPER, COLONOSCOPY, COMBINED N/A 2021    Procedure: colonoscopy attempted and upper endoscopy with biopsies;  Surgeon: Silvio Guzmán MD;  Location: [facility]     ESOPHAGOSCOPY, GASTROSCOPY, DUODENOSCOPY (EGD), COMBINED N/A 5/10/2019    Procedure: UPPER ENDOSCOPY WITH BIOPSIES;  Surgeon: Silvio Guzmán MD;  Location: [facility] OR     HERNIA REPAIR Right ?     Right knee meniscectomy Right        Family history:  History reviewed. No pertinent family history.    Medications:  Prior to Admission medications    Medication Sig Start Date End Date Taking? Authorizing Provider   carbamide peroxide (DEBROX) 6.5 % otic solution Place 5 drops Into the left ear 2 times daily Do this once a month for 4 days each time, for the next 6 months  18  Yes Carmen Kay APRN CNP   Docusate Sodium (COLACE PO) Take 200 mg by mouth 2 times daily   Yes Reported, Patient   levothyroxine (SYNTHROID/LEVOTHROID) 50 MCG tablet TAKE 1 TABLET BY MOUTH ONCE DAILY. 3/27/20  Yes Sweta Marcus MD   pantoprazole (PROTONIX) 40 MG EC tablet TAKE 1 TABLET BY MOUTH ONCE DAILY. 19  Yes ROBERT Akbar MD   QUEtiapine (SEROQUEL) 300 MG tablet TAKE 3 TABLETS BY MOUTH DAILY. 20  Yes ROBERT Akbar MD   simvastatin (ZOCOR) 40 MG tablet TAKE ONE TABLET BY MOUTH AT BEDTIME. 3/27/20  Yes Sweta Marcus MD   VITAMIN D3 25 MCG (1000 UT) tablet TAKE 1 TABLET BY MOUTH ONCE DAILY. 3/27/20  Yes Sweta Marcus MD   Acetaminophen (TYLENOL PO) Take 1,000 mg by mouth every 6 hours as needed for mild pain or fever    Reported, Patient     Allergies:  The patient is allergic to nickel, penicillins, and seasonal allergies.  .  Social history:  Social History     Tobacco Use     Smoking status: Former Smoker     Packs/day: 0.50     Types: Cigarettes     Quit date: 1993     Years since quittin.1     Smokeless tobacco: Never Used   Substance Use Topics     Alcohol use: No     Marital status: single.    Review of Systems:    Constitutional: Negative for fever, chills and weight loss.   HENT: Negative for ear pain, nosebleeds, congestion, sore throat, tinnitus and ear discharge.    Eyes: Negative for blurred vision, double vision, photophobia and pain.   Respiratory: Negative for cough, hemoptysis, shortness of breath, wheezing and stridor.    Cardiovascular: Negative for chest pain, palpitations and orthopnea.   Gastrointestinal: Negative for heartburn, nausea, vomiting, abdominal pain and blood in stool.   Genitourinary: Negative for urgency, frequency and hematuria.   Musculoskeletal: Negative for myalgias, back pain and joint pain.   Neurological: Negative for tingling, speech change and headaches.   Endo/Heme/Allergies: Does not bruise/bleed easily.  "  Psychiatric/Behavioral: Negative for depression, suicidal ideas and hallucinations. The patient is not nervous/anxious.    Physical Examination:  /93   Temp 96.9  F (36.1  C) (Tympanic)   Resp 16   Ht 1.803 m (5' 11\")   Wt 74.2 kg (163 lb 9.6 oz)   SpO2 96%   BMI 22.82 kg/m    General: Alert and orientedx4, no acute distress, well-developed/well-nourished, ambulating without assistance  HEENT: normocephalic atraumatic, extraocular movements intact, PERRL Sclerae anicteric; Trachea mideline, no JVD  Chest:   Clear to auscultation bilaterally.  Cardiac: S1S2 , regular rate and rhythm without additional sounds  Abdomen: Soft, non-tender, non-distneded  Extremities: Cursory exam unremarkable.  No peripheral edema noted.  Skin: Warm, dry, < 2 sec cap refill  Neuro: CN 2-12 grossly intact, no focal deficit, GCS 15  Psych: Pleasant, calm, asks appropriate questions      Assessment/Plan:  #1 Colonoscopy  #2 Bright red blood per rectum  #3 Constipation  #4 Fleets enema    Due to the cloudy nature of his stool and poor prep with the last colonoscopy attempt, I will order a fleets enema.    Christiano Leon and I had a discussion about colonoscopies.  The indications, risks, benefits, althernatives and technical aspects of whole colon colonoscopy were outlined with risks including, but not limited to, perforation, bleeding and inability to visualize entire colon.  Management of each was reviewed including the risk for life saving surgery and possible admittance to the hospital.  His questions were asked and answered.  We will proceed with exam as scheduled with Dr. Guzmán.  [facility]  [address] [place] [address]    [phone]    Referring Provider:  No referring provider defined for this encounter.     Primary Care Provider:  ROBERT Akbar  "

## 2021-07-22 NOTE — OR NURSING
Patient and responsible adult (Patt HARGROVE  from [facility]) given discharge instructions with no questions regarding instructions. Davina score 18. Pain  level 0/10.  Discharged from unit via ambulatory. Patient discharged to home.

## 2021-07-22 NOTE — ANESTHESIA POSTPROCEDURE EVALUATION
Patient: Christiano Leon    Procedure(s):  Colonoscopy;  Repeat colonoscopy due to incomplete colonoscopy    Diagnosis:Screening for colon cancer [Z12.11]  Diagnosis Additional Information: No value filed.    Anesthesia Type:  MAC    Note:  Disposition: Outpatient   Postop Pain Control: Uneventful            Sign Out: Well controlled pain   PONV: No   Neuro/Psych: Uneventful            Sign Out: Acceptable/Baseline neuro status   Airway/Respiratory: Uneventful            Sign Out: Acceptable/Baseline resp. status   CV/Hemodynamics: Uneventful            Sign Out: Acceptable CV status; No obvious hypovolemia; No obvious fluid overload   Other NRE: NONE   DID A NON-ROUTINE EVENT OCCUR? No           Last vitals:  Vitals Value Taken Time   BP 95/24 07/22/21 1550   Temp     Pulse 56 07/22/21 1550   Resp     SpO2 96 % 07/22/21 1551   Vitals shown include unvalidated device data.    Electronically Signed By: ZANE Ramirez CRNA  July 22, 2021  3:52 PM

## 2021-07-22 NOTE — OR NURSING
Pt up to the bathroom with clear liquid with a few brownish flecks in toilet bowl. Awilda broussard informed.

## 2021-07-22 NOTE — OP NOTE
Christiano Leon MRN# [id number]   YOB: 1945 Age: 76 year old      Date of Admission:  7/22/2021  Date of Service:   7/22/21    Primary care provider: ROBERT Akbar    PREOPERATIVE DIAGNOSIS:  Colon Cancer Screening        POSTOPERATIVE DIAGNOSIS:  Normal colon          PROCEDURE:  Colonoscopy           INDICATIONS:  Screening colonoscopy.      Specimen: * No specimens in log *    SURGEON: Silvio Guzmán MD    DESCRIPTION OF PROCEDURE: Christiano Leon was brought into the endoscopy suite and placed in the left lateral decubitus position. After preprocedural pause and attended monitored anesthesia was administered, the external anus was inspected and was normal. Digital rectal exam was normal. The colonoscope was inserted and advanced under direct visualization to the level of the cecum which was identified by the appendiceal orifice and the ileocecal valve. The prep was excellent.. Upon slow withdrawal of the colonoscope, approximately 95% of the mucosa was directly visualized. The colon was without mucosal abnormality. There was no evidence of  polyps, inflammation, bleeding or AVMs. Retroflexion of the rectum was normal. The extra air was removed from the colon, and the colonoscope withdrawn. The patient tolerated the procedure well and was taken to postanesthesia care unit.     Patient no longer requires colon cancer screening.    Silvio Guzmán MD

## 2021-10-06 DIAGNOSIS — K21.00 GASTROESOPHAGEAL REFLUX DISEASE WITH ESOPHAGITIS WITHOUT HEMORRHAGE: ICD-10-CM

## 2021-10-06 DIAGNOSIS — K59.00 CONSTIPATION, UNSPECIFIED CONSTIPATION TYPE: Primary | ICD-10-CM

## 2021-10-06 DIAGNOSIS — K21.00 GASTROESOPHAGEAL REFLUX DISEASE WITH ESOPHAGITIS: ICD-10-CM

## 2021-10-06 DIAGNOSIS — E03.9 HYPOTHYROIDISM, UNSPECIFIED TYPE: ICD-10-CM

## 2021-10-06 DIAGNOSIS — E55.9 VITAMIN D DEFICIENCY: ICD-10-CM

## 2021-10-06 DIAGNOSIS — E78.5 HYPERLIPIDEMIA LDL GOAL <100: ICD-10-CM

## 2021-10-07 RX ORDER — DOCUSATE SODIUM 100 MG/1
200 CAPSULE, LIQUID FILLED ORAL 2 TIMES DAILY
Qty: 112 CAPSULE | Refills: 0 | Status: SHIPPED | OUTPATIENT
Start: 2021-10-07 | End: 2021-11-23

## 2021-10-07 RX ORDER — SIMVASTATIN 40 MG
TABLET ORAL
Qty: 28 TABLET | Refills: 0 | Status: SHIPPED | OUTPATIENT
Start: 2021-10-07 | End: 2021-11-23

## 2021-10-07 RX ORDER — LEVOTHYROXINE SODIUM 50 UG/1
50 TABLET ORAL DAILY
Qty: 28 TABLET | Refills: 0 | Status: SHIPPED | OUTPATIENT
Start: 2021-10-07 | End: 2021-11-23

## 2021-10-07 RX ORDER — PANTOPRAZOLE SODIUM 40 MG/1
40 TABLET, DELAYED RELEASE ORAL DAILY
Qty: 28 TABLET | Refills: 5 | Status: SHIPPED | OUTPATIENT
Start: 2021-10-07 | End: 2022-02-10

## 2021-10-07 RX ORDER — VITAMIN B COMPLEX
1 TABLET ORAL DAILY
Qty: 28 TABLET | Refills: 0 | Status: SHIPPED | OUTPATIENT
Start: 2021-10-07 | End: 2021-11-23

## 2021-10-07 NOTE — TELEPHONE ENCOUNTER
Nemesio      Last Written Prescription Date:  0  Last Fill Quantity: 0,   # refills: 0  Last Office Visit: 4/29/21  Future Office visit:       Routing refill request to provider for review/approval because:  Medication is reported/historical      Levothyroxine      Last Written Prescription Date:  3/27/20  Last Fill Quantity: 28,   # refills: 0  Last Office Visit: 4/29/21  Future Office visit:       Routing refill request to provider for review/approval because:        Protonix      Last Written Prescription Date:  12/12/19  Last Fill Quantity: 28,   # refills: 5  Last Office Visit: 4/29/21  Future Office visit:       Routing refill request to provider for review/approval because:        Simvastatin      Last Written Prescription Date:  3/27/20  Last Fill Quantity: 28,   # refills: 0  Last Office Visit: 4/29/21  Future Office visit:       Routing refill request to provider for review/approval because:        Vit D      Last Written Prescription Date:  3/27/20  Last Fill Quantity: 28,   # refills: 0  Last Office Visit: 4/29/21  Future Office visit:       Routing refill request to provider for review/approval because:

## 2021-11-24 NOTE — MR AVS SNAPSHOT
After Visit Summary   11/8/2018    Christiano Leon    MRN: [id number]           Patient Information     Date Of Birth          1945        Visit Information        Provider Department      11/8/2018 2:30 PM ROBERT Akbar MD [facility]        Today's Diagnoses     Chronic right shoulder pain    -  1       Follow-ups after your visit        Additional Services     PHYSICAL THERAPY REFERRAL       If you have not heard from the scheduling office within 2 business days, please call [phone] for all locations, with the exception of [place], please call [phone] and [place], please call [phone].    Please be aware that coverage of these services is subject to the terms and limitations of your health insurance plan.  Call member services at your health plan with any benefit or coverage questions.                  Your next 10 appointments already scheduled     Nov 28, 2018  1:30 PM CST   (Arrive by 1:15 PM)   Evaluation with Fab Mckee PT   [facility] ([facility] )    [address] [address]   [phone]            Apr 09, 2019  1:00 PM CDT   (Arrive by 12:45 PM)   [facility]   [facility]    [address] [facility] [address]   [phone]           Bring a current list of meds and any records pertaining to this visit.  For Physicals, please bring immunization records and any forms needing to be filled out.  Please arrive 15 minutes early to complete paperwork and register.              Future tests that were ordered for you today     Open Future Orders        Priority Expected Expires Ordered    PHYSICAL THERAPY REFERRAL Routine 11/8/2018 11/8/2019 11/8/2018            Who to contact     If you have questions or need follow up information about today's clinic visit or your schedule please contact [facility] directly at  [phone].  Normal or non-critical lab and imaging results will be communicated to you by MyChart, letter or phone within 4 business days after the clinic has received the results. If you do not hear from us within 7 days, please contact the clinic through MyChart or phone. If you have a critical or abnormal lab result, we will notify you by phone as soon as possible.  Submit refill requests through [employer] or call your pharmacy and they will forward the refill request to us. Please allow 3 business days for your refill to be completed.          Additional Information About Your Visit        Care EveryWhere ID     This is your Care EveryWhere ID. This could be used by other organizations to access your [facility] medical records  [id number]        Your Vitals Were     Pulse Temperature Pulse Oximetry BMI (Body Mass Index)          85 97.3  F (36.3  C) (Tympanic) 96% 23.43 kg/m2         Blood Pressure from Last 3 Encounters:   11/08/18 124/76   10/04/18 134/82   09/26/18 105/68    Weight from Last 3 Encounters:   11/08/18 168 lb (76.2 kg)   10/04/18 169 lb (76.7 kg)   09/26/18 163 lb (73.9 kg)               Primary Care Provider Office Phone # Fax #    R Dustin Akbar [phone]       [facility] [place] [id number]        Equal Access to Services     ANA CARDONA AH: Hadii aad ku hadasho Soomaali, waaxda luqadaha, qaybta kaalmada travis, stoney roberson . So [facility] [phone].    ATENCIÓN: Si habla español, tiene a pierce disposición servicios gratuitos de asistencia lingüística. Anabel al [phone].    We comply with applicable federal civil rights laws and [place] laws. We do not discriminate on the basis of race, color, national origin, age, disability, sex, sexual orientation, or gender identity.            Thank you!     Thank you for choosing [facility]  for your care. Our goal is always to provide you with excellent care. Hearing  back from our patients is one way we can continue to improve our services. Please take a few minutes to complete the written survey that you may receive in the mail after your visit with us. Thank you!             Your Updated Medication List - Protect others around you: Learn how to safely use, store and throw away your medicines at www.disposemymeds.org.          This list is accurate as of 11/8/18  2:36 PM.  Always use your most recent med list.                   Brand Name Dispense Instructions for use Diagnosis    carbamide peroxide 6.5 % otic solution    DEBROX    30 mL    Place 5 drops Into the left ear 2 times daily Do this once a month for 4 days each time, for the next 6 months    Impacted cerumen of left ear       cholecalciferol 1000 UNIT tablet    vitamin D3    100 tablet    TAKE 1 TABLET BY MOUTH ONCE DAILY    Vitamin D deficiency       * COLACE PO      Take 200 mg by mouth 2 times daily        *  MG capsule   Generic drug:  docusate sodium     100 capsule    TAKE 2 CAPSULES BY MOUTH TWICE A DAY    Constipation, unspecified constipation type       levothyroxine 50 MCG tablet    SYNTHROID/LEVOTHROID    30 tablet    TAKE 1 TABLET DAILY BY MOUTH - GENERIC FOR SYNTHROID    Hypothyroidism, unspecified type       SEROQUEL PO      Take 900 mg by mouth At Bedtime        simvastatin 40 MG tablet    ZOCOR    90 tablet    Take 1 tablet (40 mg) by mouth At Bedtime    Hyperlipidemia LDL goal <100       TYLENOL PO      Take 1,000 mg by mouth every 6 hours as needed for mild pain or fever        * Notice:  This list has 2 medication(s) that are the same as other medications prescribed for you. Read the directions carefully, and ask your doctor or other care provider to review them with you.       [Normal Supraclavicular Nodes] : no supraclavicular lymphadenopathy [Normal Axillary Nodes] : no axillary lymphadenopathy [Normal Anterior Cervical Nodes] : no anterior cervical lymphadenopathy [Normal] : affect was normal and insight and judgment were intact

## 2021-12-09 ENCOUNTER — OFFICE VISIT (OUTPATIENT)
Dept: FAMILY MEDICINE | Facility: OTHER | Age: 76
End: 2021-12-09
Attending: FAMILY MEDICINE
Payer: MEDICARE

## 2021-12-09 ENCOUNTER — ANCILLARY PROCEDURE (OUTPATIENT)
Dept: GENERAL RADIOLOGY | Facility: OTHER | Age: 76
End: 2021-12-09
Attending: FAMILY MEDICINE
Payer: MEDICARE

## 2021-12-09 VITALS
TEMPERATURE: 98.1 F | SYSTOLIC BLOOD PRESSURE: 124 MMHG | HEART RATE: 88 BPM | HEIGHT: 71 IN | OXYGEN SATURATION: 95 % | WEIGHT: 162 LBS | RESPIRATION RATE: 18 BRPM | BODY MASS INDEX: 22.68 KG/M2 | DIASTOLIC BLOOD PRESSURE: 76 MMHG

## 2021-12-09 DIAGNOSIS — G89.29 CHRONIC PAIN OF RIGHT KNEE: ICD-10-CM

## 2021-12-09 DIAGNOSIS — M25.561 CHRONIC PAIN OF RIGHT KNEE: Primary | ICD-10-CM

## 2021-12-09 DIAGNOSIS — M25.561 CHRONIC PAIN OF RIGHT KNEE: ICD-10-CM

## 2021-12-09 DIAGNOSIS — G89.29 CHRONIC PAIN OF RIGHT KNEE: Primary | ICD-10-CM

## 2021-12-09 PROCEDURE — G0463 HOSPITAL OUTPT CLINIC VISIT: HCPCS | Mod: 25

## 2021-12-09 PROCEDURE — 99213 OFFICE O/P EST LOW 20 MIN: CPT | Performed by: FAMILY MEDICINE

## 2021-12-09 PROCEDURE — 73562 X-RAY EXAM OF KNEE 3: CPT | Mod: TC,RT

## 2021-12-09 RX ORDER — LOPERAMIDE HCL 2 MG
4 CAPSULE ORAL
COMMUNITY

## 2021-12-09 ASSESSMENT — ANXIETY QUESTIONNAIRES
1. FEELING NERVOUS, ANXIOUS, OR ON EDGE: NOT AT ALL
GAD7 TOTAL SCORE: 0
2. NOT BEING ABLE TO STOP OR CONTROL WORRYING: NOT AT ALL
7. FEELING AFRAID AS IF SOMETHING AWFUL MIGHT HAPPEN: NOT AT ALL
4. TROUBLE RELAXING: NOT AT ALL
3. WORRYING TOO MUCH ABOUT DIFFERENT THINGS: NOT AT ALL
6. BECOMING EASILY ANNOYED OR IRRITABLE: NOT AT ALL
5. BEING SO RESTLESS THAT IT IS HARD TO SIT STILL: NOT AT ALL

## 2021-12-09 ASSESSMENT — PATIENT HEALTH QUESTIONNAIRE - PHQ9: SUM OF ALL RESPONSES TO PHQ QUESTIONS 1-9: 0

## 2021-12-09 ASSESSMENT — MIFFLIN-ST. JEOR: SCORE: 1476.96

## 2021-12-09 ASSESSMENT — PAIN SCALES - GENERAL: PAINLEVEL: MILD PAIN (3)

## 2021-12-09 NOTE — PROGRESS NOTES
"  Assessment & Plan     Chronic pain of right knee    - XR Knee Right 3 Views; Future  - Orthopedic  Referral; Future    X-ray done of right knee some arthritic changes seen.  Will refer him to [facility] for evaluation.  Patient is requesting a steroid shot and I told him he could discuss that with the orthopedic doctor.  He will get an appointment with one of my partners since I am retiring                 R Dustin Akbar MD  [facility] - LESLEE Alvarado is a 76 year old who presents for the following health issues     HPI     Concern - Referral  Onset: chronic  Description: Knee pain Rt  Intensity: moderate  Progression of Symptoms:  worsening and intermittent  Accompanying Signs & Symptoms: Pain  Previous history of similar problem: Yes  Precipitating factors:        Worsened by: Movement  Alleviating factors:        Improved by: NA  Therapies tried and outcome: Surgeries in the past        Review of Systems   Constitutional, HEENT, cardiovascular, pulmonary, gi and gu systems are negative, except as otherwise noted.      Objective    /76 (BP Location: Left arm, Patient Position: Sitting, Cuff Size: Adult Large)   Pulse 88   Temp 98.1  F (36.7  C) (Tympanic)   Resp 18   Ht 1.803 m (5' 11\")   Wt 73.5 kg (162 lb)   SpO2 95%   BMI 22.59 kg/m    Body mass index is 22.59 kg/m .  Physical Exam   GENERAL: healthy, alert and no distress  EYES: Eyes grossly normal to inspection, PERRL and conjunctivae and sclerae normal  NECK: no adenopathy, no asymmetry, masses, or scars and thyroid normal to palpation  RESP: lungs clear to auscultation - no rales, rhonchi or wheezes  CV: regular rate and rhythm, normal S1 S2, no S3 or S4, no murmur, click or rub, no peripheral edema and peripheral pulses strong  ABDOMEN: soft, nontender, no hepatosplenomegaly, no masses and bowel sounds normal  MS: He has a sleeve over his right knee this was removed.  He has no redness or ecchymosis " he has good range of motion no obvious instability has little joint line tenderness

## 2021-12-09 NOTE — NURSING NOTE
"Chief Complaint   Patient presents with     Musculoskeletal Problem       Initial /76 (BP Location: Left arm, Patient Position: Sitting, Cuff Size: Adult Large)   Pulse 88   Temp 98.1  F (36.7  C) (Tympanic)   Resp 18   Ht 1.803 m (5' 11\")   Wt 73.5 kg (162 lb)   SpO2 95%   BMI 22.59 kg/m   Estimated body mass index is 22.59 kg/m  as calculated from the following:    Height as of this encounter: 1.803 m (5' 11\").    Weight as of this encounter: 73.5 kg (162 lb).  Medication Reconciliation: complete  Danna Jha LPN  "

## 2021-12-10 ASSESSMENT — ANXIETY QUESTIONNAIRES: GAD7 TOTAL SCORE: 0

## 2022-01-07 ENCOUNTER — TRANSFERRED RECORDS (OUTPATIENT)
Dept: HEALTH INFORMATION MANAGEMENT | Facility: HOSPITAL | Age: 77
End: 2022-01-07
Payer: MEDICARE

## 2022-02-09 DIAGNOSIS — K21.00 GASTROESOPHAGEAL REFLUX DISEASE WITH ESOPHAGITIS WITHOUT HEMORRHAGE: ICD-10-CM

## 2022-02-10 RX ORDER — PANTOPRAZOLE SODIUM 40 MG/1
40 TABLET, DELAYED RELEASE ORAL DAILY
Qty: 28 TABLET | Refills: 0 | Status: SHIPPED | OUTPATIENT
Start: 2022-02-10 | End: 2022-04-08

## 2022-02-24 DIAGNOSIS — M17.11 PRIMARY OSTEOARTHRITIS OF RIGHT KNEE: Primary | ICD-10-CM

## 2022-03-02 ENCOUNTER — TELEPHONE (OUTPATIENT)
Dept: FAMILY MEDICINE | Facility: OTHER | Age: 77
End: 2022-03-02
Payer: MEDICARE

## 2022-03-02 NOTE — TELEPHONE ENCOUNTER
1:53 PM    Reason for Call: Phone Call    Description: Evon calling from [facility] wondering if Genny Dang will also do a Preop physical when the patient is coming in on March 18, 2022 for an establish care appointment? Patient is having a right total knee arthroplasty with Dr Dobson [facility] on 4/1/2022. They are having trouble with transportation and they want to save the patient from coming to the clinic.     Was an appointment offered for this call? No  If yes : Appointment type              Date    Preferred method for responding to this message: Telephone Call  What is your phone number ? [phone] Evon [facility].    If we cannot reach you directly, may we leave a detailed response at the number you provided? Yes    Can this message wait until your PCP/provider returns, if available today? YES, No    Kalli Alejandre

## 2022-03-07 ENCOUNTER — TELEPHONE (OUTPATIENT)
Dept: ORTHOPEDICS | Facility: OTHER | Age: 77
End: 2022-03-07
Payer: MEDICARE

## 2022-03-07 NOTE — TELEPHONE ENCOUNTER
3/7/22    Spoke with nurse to schedule covid test and physical therapy for upcoming procedure with Dr. Allen 4/1/22. Nurse stated they would complete covid test at facility on 3/28/22, and fax results to [facility] at [phone]. Email sent to physical therapy scheduling team to reach out to nurse at [facility] to discuss options for physical therapy.  Nurse can be reached at [phone] Ext [phone].    -Eva REECE  [facility]  [phone]

## 2022-03-09 DIAGNOSIS — E03.9 HYPOTHYROIDISM, UNSPECIFIED TYPE: ICD-10-CM

## 2022-03-09 DIAGNOSIS — E78.5 HYPERLIPIDEMIA LDL GOAL <100: ICD-10-CM

## 2022-03-09 RX ORDER — SIMVASTATIN 40 MG
TABLET ORAL
Qty: 28 TABLET | Refills: 4 | Status: SHIPPED | OUTPATIENT
Start: 2022-03-09 | End: 2022-06-30

## 2022-03-09 RX ORDER — LEVOTHYROXINE SODIUM 50 UG/1
50 TABLET ORAL DAILY
Qty: 28 TABLET | Refills: 4 | Status: SHIPPED | OUTPATIENT
Start: 2022-03-09 | End: 2022-06-30

## 2022-03-09 NOTE — TELEPHONE ENCOUNTER
levothyroxine      Last Written Prescription Date:  11/23/21  Last Fill Quantity: 28,   # refills: 4  Last Office Visit: 12/9/21  Future Office visit:    Next 5 appointments (look out 90 days)    Mar 18, 2022  2:30 PM  (Arrive by 2:15 PM)  Pre-Op physical with ZANE Navarrete CNP  [facility] [place] ([facility] [place] ) [address]  [place] [address]  [phone]         zocor      Last Written Prescription Date:  11/23/21  Last Fill Quantity: 28,   # refills: 4  Last Office Visit: 12/9/21  Future Office visit:    Next 5 appointments (look out 90 days)    Mar 18, 2022  2:30 PM  (Arrive by 2:15 PM)  Pre-Op physical with ZANE Navarrete CNP  [facility] [place] ([facility] [place] ) [address]  [place] [address]  [phone]

## 2022-03-10 NOTE — TELEPHONE ENCOUNTER
[facility] is still waiting for a call back and preop and establish care on 03/18/2022 the same day. Due to transportation.[facility] is still waiting to hear if someone could them asap at [phone]

## 2022-03-18 ENCOUNTER — TELEPHONE (OUTPATIENT)
Dept: FAMILY MEDICINE | Facility: OTHER | Age: 77
End: 2022-03-18

## 2022-03-18 ENCOUNTER — OFFICE VISIT (OUTPATIENT)
Dept: FAMILY MEDICINE | Facility: OTHER | Age: 77
End: 2022-03-18
Attending: NURSE PRACTITIONER
Payer: MEDICARE

## 2022-03-18 VITALS
BODY MASS INDEX: 23.8 KG/M2 | OXYGEN SATURATION: 94 % | HEART RATE: 80 BPM | HEIGHT: 71 IN | SYSTOLIC BLOOD PRESSURE: 136 MMHG | DIASTOLIC BLOOD PRESSURE: 76 MMHG | WEIGHT: 170 LBS | TEMPERATURE: 96.8 F

## 2022-03-18 DIAGNOSIS — M25.561 CHRONIC PAIN OF RIGHT KNEE: ICD-10-CM

## 2022-03-18 DIAGNOSIS — Z01.818 PREOP GENERAL PHYSICAL EXAM: Primary | ICD-10-CM

## 2022-03-18 DIAGNOSIS — G89.29 CHRONIC PAIN OF RIGHT KNEE: ICD-10-CM

## 2022-03-18 DIAGNOSIS — Z76.89 ENCOUNTER TO ESTABLISH CARE: ICD-10-CM

## 2022-03-18 DIAGNOSIS — E03.9 ACQUIRED HYPOTHYROIDISM: ICD-10-CM

## 2022-03-18 DIAGNOSIS — F03.91 DEMENTIA WITH BEHAVIORAL DISTURBANCE, UNSPECIFIED DEMENTIA TYPE: ICD-10-CM

## 2022-03-18 DIAGNOSIS — E78.00 HYPERCHOLESTEROLEMIA: ICD-10-CM

## 2022-03-18 LAB
ALBUMIN SERPL-MCNC: 3.5 G/DL (ref 3.4–5)
ALP SERPL-CCNC: 82 U/L (ref 40–150)
ALT SERPL W P-5'-P-CCNC: 20 U/L (ref 0–70)
ANION GAP SERPL CALCULATED.3IONS-SCNC: 4 MMOL/L (ref 3–14)
AST SERPL W P-5'-P-CCNC: 19 U/L (ref 0–45)
BASOPHILS # BLD AUTO: 0 10E3/UL (ref 0–0.2)
BASOPHILS NFR BLD AUTO: 0 %
BILIRUB SERPL-MCNC: 0.5 MG/DL (ref 0.2–1.3)
BUN SERPL-MCNC: 14 MG/DL (ref 7–30)
CALCIUM SERPL-MCNC: 8.6 MG/DL (ref 8.5–10.1)
CHLORIDE BLD-SCNC: 110 MMOL/L (ref 94–109)
CO2 SERPL-SCNC: 27 MMOL/L (ref 20–32)
CREAT SERPL-MCNC: 1.09 MG/DL (ref 0.66–1.25)
EOSINOPHIL # BLD AUTO: 0.1 10E3/UL (ref 0–0.7)
EOSINOPHIL NFR BLD AUTO: 1 %
ERYTHROCYTE [DISTWIDTH] IN BLOOD BY AUTOMATED COUNT: 13.6 % (ref 10–15)
GFR SERPL CREATININE-BSD FRML MDRD: 69 ML/MIN/1.73M2
GLUCOSE BLD-MCNC: 103 MG/DL (ref 70–99)
HCT VFR BLD AUTO: 41.7 % (ref 40–53)
HGB BLD-MCNC: 13.6 G/DL (ref 13.3–17.7)
IMM GRANULOCYTES # BLD: 0 10E3/UL
IMM GRANULOCYTES NFR BLD: 0 %
LYMPHOCYTES # BLD AUTO: 1 10E3/UL (ref 0.8–5.3)
LYMPHOCYTES NFR BLD AUTO: 21 %
MCH RBC QN AUTO: 29.4 PG (ref 26.5–33)
MCHC RBC AUTO-ENTMCNC: 32.6 G/DL (ref 31.5–36.5)
MCV RBC AUTO: 90 FL (ref 78–100)
MONOCYTES # BLD AUTO: 0.5 10E3/UL (ref 0–1.3)
MONOCYTES NFR BLD AUTO: 10 %
NEUTROPHILS # BLD AUTO: 3.3 10E3/UL (ref 1.6–8.3)
NEUTROPHILS NFR BLD AUTO: 68 %
NRBC # BLD AUTO: 0 10E3/UL
NRBC BLD AUTO-RTO: 0 /100
PLATELET # BLD AUTO: 171 10E3/UL (ref 150–450)
POTASSIUM BLD-SCNC: 4.2 MMOL/L (ref 3.4–5.3)
PROT SERPL-MCNC: 7.1 G/DL (ref 6.8–8.8)
RBC # BLD AUTO: 4.63 10E6/UL (ref 4.4–5.9)
SODIUM SERPL-SCNC: 141 MMOL/L (ref 133–144)
TSH SERPL DL<=0.005 MIU/L-ACNC: 0.99 MU/L (ref 0.4–4)
WBC # BLD AUTO: 4.9 10E3/UL (ref 4–11)

## 2022-03-18 PROCEDURE — G0463 HOSPITAL OUTPT CLINIC VISIT: HCPCS | Mod: 25 | Performed by: NURSE PRACTITIONER

## 2022-03-18 PROCEDURE — 84443 ASSAY THYROID STIM HORMONE: CPT | Mod: ZL | Performed by: NURSE PRACTITIONER

## 2022-03-18 PROCEDURE — 85025 COMPLETE CBC W/AUTO DIFF WBC: CPT | Mod: ZL | Performed by: NURSE PRACTITIONER

## 2022-03-18 PROCEDURE — 80053 COMPREHEN METABOLIC PANEL: CPT | Mod: ZL | Performed by: NURSE PRACTITIONER

## 2022-03-18 PROCEDURE — 36415 COLL VENOUS BLD VENIPUNCTURE: CPT | Mod: ZL | Performed by: NURSE PRACTITIONER

## 2022-03-18 PROCEDURE — 99417 PROLNG OP E/M EACH 15 MIN: CPT | Performed by: NURSE PRACTITIONER

## 2022-03-18 PROCEDURE — 93010 ELECTROCARDIOGRAM REPORT: CPT | Performed by: INTERNAL MEDICINE

## 2022-03-18 PROCEDURE — 99215 OFFICE O/P EST HI 40 MIN: CPT | Performed by: NURSE PRACTITIONER

## 2022-03-18 PROCEDURE — 93005 ELECTROCARDIOGRAM TRACING: CPT | Performed by: NURSE PRACTITIONER

## 2022-03-18 ASSESSMENT — PAIN SCALES - GENERAL: PAINLEVEL: NO PAIN (0)

## 2022-03-18 NOTE — PROGRESS NOTES
Geetha Alvarado is a 77 year old who presents for the following health issues     HPI     Establish Care  Previous provider Dr Dustin Akbar   Appointment changed to Pre-op and establish care - see pre op note     Dementia with behaviors   Live at [facility]   Current treatment with Seroquel,      Hyperlipidemia Follow-Up      Are you regularly taking any medication or supplement to lower your cholesterol?   Yes- Zocor    Are you having muscle aches or other side effects that you think could be caused by your cholesterol lowering medication?      Hypothyroidism Follow-up  Levothyroxine 50 mcg daily     Since last visit, patient describes the following symptoms:   TSH   Date Value Ref Range Status   04/29/2021 1.52 0.40 - 4.00 mU/L Final

## 2022-03-18 NOTE — PATIENT INSTRUCTIONS
Preparing for Your Surgery  Getting started  A nurse will call you to review your health history and instructions. They will give you an arrival time based on your scheduled surgery time. Please be ready to share:    Your doctor's clinic name and phone number    Your medical, surgical and anesthesia history    A list of allergies and sensitivities    A list of medicines, including herbal treatments and over-the-counter drugs    Whether the patient has a legal guardian (ask how to send us the papers in advance)  Please tell us if you're pregnant--or if there's any chance you might be pregnant. Some surgeries may injure a fetus (unborn baby), so they require a pregnancy test. Surgeries that are safe for a fetus don't always need a test, and you can choose whether to have one.   If you have a child who's having surgery, please ask for a copy of Preparing for Your Child's Surgery.    Preparing for surgery    Within 30 days of surgery: Have a pre-op exam (sometimes called an H&P, or History and Physical). This can be done at a clinic or pre-operative center.  ? If you're having a , you may not need this exam. Talk to your care team.    At your pre-op exam, talk to your care team about all medicines you take. If you need to stop any medicines before surgery, ask when to start taking them again.  ? We do this for your safety. Many medicines can make you bleed too much during surgery. Some change how well surgery (anesthesia) drugs work.    Call your insurance company to let them know you're having surgery. (If you don't have insurance, call [phone].)    Call your clinic if there's any change in your health. This includes signs of a cold or flu (sore throat, runny nose, cough, rash, fever). It also includes a scrape or scratch near the surgery site.    If you have questions on the day of surgery, call your hospital or surgery center.  COVID testing  You may need to be tested for COVID-19 before having  surgery. If so, your surgical team will give you instructions for scheduling this test, separate from your preoperative history and physical.  Eating and drinking guidelines  For your safety: Unless your surgeon tells you otherwise, follow the guidelines below.    Eat and drink as usual until 8 hours before surgery. After that, no food or milk.    Drink clear liquids until 2 hours before surgery. These are liquids you can see through, like water, Gatorade and Propel Water. You may also have black coffee and tea (no cream or milk).    Nothing by mouth within 2 hours of surgery. This includes gum, candy and breath mints.    If you drink alcohol: Stop drinking it the night before surgery.    If your care team tells you to take medicine on the morning of surgery, it's okay to take it with a sip of water.  Preventing infection    Shower or bathe the night before and morning of your surgery. Follow the instructions your clinic gave you. (If no instructions, use regular soap.)    Don't shave or clip hair near your surgery site. We'll remove the hair if needed.    Don't smoke or vape the morning of surgery. You may chew nicotine gum up to 2 hours before surgery. A nicotine patch is okay.  ? Note: Some surgeries require you to completely quit smoking and nicotine. Check with your surgeon.    Your care team will make every effort to keep you safe from infection. We will:  ? Clean our hands often with soap and water (or an alcohol-based hand rub).  ? Clean the skin at your surgery site with a special soap that kills germs.  ? Give you a special gown to keep you warm. (Cold raises the risk of infection.)  ? Wear special hair covers, masks, gowns and gloves during surgery.  ? Give antibiotic medicine, if prescribed. Not all surgeries need antibiotics.  What to bring on the day of surgery    Photo ID and insurance card    Copy of your health care directive, if you have one    Glasses and hearing aides (bring cases)  ? You can't  wear contacts during surgery    Inhaler and eye drops, if you use them (tell us about these when you arrive)    CPAP machine or breathing device, if you use them    A few personal items, if spending the night    If you have . . .  ? A pacemaker, ICD (cardiac defibrillator) or other implant: Bring the ID card.  ? An implanted stimulator: Bring the remote control.  ? A legal guardian: Bring a copy of the certified (court-stamped) guardianship papers.  Please remove any jewelry, including body piercings. Leave jewelry and other valuables at home.  If you're going home the day of surgery    You must have a responsible adult drive you home. They should stay with you overnight as well.    If you don't have someone to stay with you, and you aren't safe to go home alone, we may keep you overnight. Insurance often won't pay for this.  After surgery  If it's hard to control your pain or you need more pain medicine, please call your surgeon's office.  Questions?   If you have any questions for your care team, list them here: _________________________________________________________________________________________________________________________________________________________________________ ____________________________________ ____________________________________ ____________________________________  For informational purposes only. Not to replace the advice of your health care provider. Copyright   2003, 2019 [employer] [facility]. All rights reserved. Clinically reviewed by Eloina Ramirez MD. [place] [id number] - REV 07/21.    Preparing for Your Surgery  Getting started  A nurse will call you to review your health history and instructions. They will give you an arrival time based on your scheduled surgery time. Please be ready to share:    Your doctor's clinic name and phone number    Your medical, surgical and anesthesia history    A list of allergies and sensitivities    A list of medicines, including herbal treatments and  over-the-counter drugs    Whether the patient has a legal guardian (ask how to send us the papers in advance)  Please tell us if you're pregnant--or if there's any chance you might be pregnant. Some surgeries may injure a fetus (unborn baby), so they require a pregnancy test. Surgeries that are safe for a fetus don't always need a test, and you can choose whether to have one.   If you have a child who's having surgery, please ask for a copy of Preparing for Your Child's Surgery.    Preparing for surgery    Within 30 days of surgery: Have a pre-op exam (sometimes called an H&P, or History and Physical). This can be done at a clinic or pre-operative center.  ? If you're having a , you may not need this exam. Talk to your care team.    At your pre-op exam, talk to your care team about all medicines you take. If you need to stop any medicines before surgery, ask when to start taking them again.  ? We do this for your safety. Many medicines can make you bleed too much during surgery. Some change how well surgery (anesthesia) drugs work.    Call your insurance company to let them know you're having surgery. (If you don't have insurance, call [phone].)    Call your clinic if there's any change in your health. This includes signs of a cold or flu (sore throat, runny nose, cough, rash, fever). It also includes a scrape or scratch near the surgery site.    If you have questions on the day of surgery, call your hospital or surgery center.  COVID testing  You may need to be tested for COVID-19 before having surgery. If so, your surgical team will give you instructions for scheduling this test, separate from your preoperative history and physical.  Eating and drinking guidelines  For your safety: Unless your surgeon tells you otherwise, follow the guidelines below.    Eat and drink as usual until 8 hours before surgery. After that, no food or milk.    Drink clear liquids until 2 hours before surgery. These are  liquids you can see through, like water, Gatorade and Propel Water. You may also have black coffee and tea (no cream or milk).    Nothing by mouth within 2 hours of surgery. This includes gum, candy and breath mints.    If you drink alcohol: Stop drinking it the night before surgery.    If your care team tells you to take medicine on the morning of surgery, it's okay to take it with a sip of water.  Preventing infection    Shower or bathe the night before and morning of your surgery. Follow the instructions your clinic gave you. (If no instructions, use regular soap.)    Don't shave or clip hair near your surgery site. We'll remove the hair if needed.    Don't smoke or vape the morning of surgery. You may chew nicotine gum up to 2 hours before surgery. A nicotine patch is okay.  ? Note: Some surgeries require you to completely quit smoking and nicotine. Check with your surgeon.    Your care team will make every effort to keep you safe from infection. We will:  ? Clean our hands often with soap and water (or an alcohol-based hand rub).  ? Clean the skin at your surgery site with a special soap that kills germs.  ? Give you a special gown to keep you warm. (Cold raises the risk of infection.)  ? Wear special hair covers, masks, gowns and gloves during surgery.  ? Give antibiotic medicine, if prescribed. Not all surgeries need antibiotics.  What to bring on the day of surgery    Photo ID and insurance card    Copy of your health care directive, if you have one    Glasses and hearing aides (bring cases)  ? You can't wear contacts during surgery    Inhaler and eye drops, if you use them (tell us about these when you arrive)    CPAP machine or breathing device, if you use them    A few personal items, if spending the night    If you have . . .  ? A pacemaker, ICD (cardiac defibrillator) or other implant: Bring the ID card.  ? An implanted stimulator: Bring the remote control.  ? A legal guardian: Bring a copy of the  certified (court-stamped) guardianship papers.  Please remove any jewelry, including body piercings. Leave jewelry and other valuables at home.  If you're going home the day of surgery    You must have a responsible adult drive you home. They should stay with you overnight as well.    If you don't have someone to stay with you, and you aren't safe to go home alone, we may keep you overnight. Insurance often won't pay for this.  After surgery  If it's hard to control your pain or you need more pain medicine, please call your surgeon's office.  Questions?   If you have any questions for your care team, list them here: _________________________________________________________________________________________________________________________________________________________________________ ____________________________________ ____________________________________ ____________________________________  For informational purposes only. Not to replace the advice of your health care provider. Copyright   2003, 2019 [employer]. All rights reserved. Clinically reviewed by Eloina Ramirez MD. [employer] [id number] - REV 07/21.      Can take all medications with a sip of water on the day of surgery

## 2022-03-18 NOTE — NURSING NOTE
"Chief Complaint   Patient presents with     Pre-Op Exam     Establish Care     Lipids       Initial /76   Pulse 80   Temp 96.8  F (36  C) (Tympanic)   Ht 1.803 m (5' 11\")   Wt 77.1 kg (170 lb)   SpO2 94%   BMI 23.71 kg/m   Estimated body mass index is 23.71 kg/m  as calculated from the following:    Height as of this encounter: 1.803 m (5' 11\").    Weight as of this encounter: 77.1 kg (170 lb).  Medication Reconciliation: complete  Lauren Moreno LPN  "

## 2022-03-18 NOTE — H&P (VIEW-ONLY)
Geetha Alvarado is a 77 year old who presents for the following health issues     HPI     Establish Care  Previous provider Dr Dustin Akbar   Appointment changed to Pre-op and establish care - see pre op note     Dementia with behaviors   Live at [facility]   Current treatment with [facility],      Hyperlipidemia Follow-Up      Are you regularly taking any medication or supplement to lower your cholesterol?   Yes- Zocor    Are you having muscle aches or other side effects that you think could be caused by your cholesterol lowering medication?      Hypothyroidism Follow-up  Levothyroxine 50 mcg daily     Since last visit, patient describes the following symptoms:   TSH   Date Value Ref Range Status   04/29/2021 1.52 0.40 - 4.00 mU/L Final

## 2022-03-18 NOTE — PROGRESS NOTES
[facility] - [place]  [address]  [facility] [place] [address]  Phone: [phone]  Primary Provider: ROBERT Akbar  Pre-op Performing Provider: JEOVANNY BLAIR      PREOPERATIVE EVALUATION:  Today's date: 3/18/2022    Christiano Leon is a 77 year old male who presents for a preoperative evaluation.    Surgical Information:  Surgery/Procedure: Right TKA  Surgery Location: [facility]  Surgeon: Dr. Dobson  Surgery Date: 4/1/22  Time of Surgery: TBD  Where patient plans to recover: At a nursing home assisted living  Fax number for surgical facility: Note does not need to be faxed, will be available electronically in Epic.    Type of Anesthesia Anticipated: to be determined    Assessment & Plan     The proposed surgical procedure is considered INTERMEDIATE risk.    Preop general physical exam  Chronic pain of right knee  Cleared for procedure   - Comprehensive metabolic panel (BMP + Alb, Alk Phos, ALT, AST, Total. Bili, TP); Future  - CBC with platelets and differential; Future  - Comprehensive metabolic panel (BMP + Alb, Alk Phos, ALT, AST, Total. Bili, TP)  - CBC with platelets and differential  - EKG 12-lead complete w/read - (Clinic Performed)    Encounter to establish care      Dementia with behavioral disturbance, unspecified dementia type (H)  Spoke with staff at [facility]   Christiano does take 900 mg of seroquil ordered by [facility] psychiatry and is court ordered     Acquired hypothyroidism  Stable - continue current medication   - TSH with free T4 reflex; Future  - TSH with free T4 reflex    Hypercholesterolemia  Continue current medications            Risks and Recommendations:  The patient has the following additional risks and recommendations for perioperative complications:   - dementia     Medication Instructions:  Patient is to take all scheduled medications on the day of surgery EXCEPT for modifications listed below:   - Statins: Continue taking on the day of surgery.    - SSRIs, SNRIs, TCAs,  Antipsychotics: Continue without modification.     RECOMMENDATION:  APPROVAL GIVEN to proceed with proposed procedure, without further diagnostic evaluation.        I spent a total of 75 minutes on the day of the visit.   Time spent doing chart review, history and exam, documentation and further activities per the note        Subjective     HPI related to upcoming procedure: right knee pain       Preop Questions 3/18/2022   1. Have you ever had a heart attack or stroke? No   2. Have you ever had surgery on your heart or blood vessels, such as a stent placement, a coronary artery bypass, or surgery on an artery in your head, neck, heart, or legs? No   3. Do you have chest pain with activity? No   4. Do you have a history of  heart failure? No   5. Do you currently have a cold, bronchitis or symptoms of other infection? No   6. Do you have a cough, shortness of breath, or wheezing? No   7. Do you or anyone in your family have previous history of blood clots? No   8. Do you or does anyone in your family have a serious bleeding problem such as prolonged bleeding following surgeries or cuts? No   9. Have you ever had problems with anemia or been told to take iron pills? No   10. Have you had any abnormal blood loss such as black, tarry or bloody stools? No   11. Have you ever had a blood transfusion? No   12. Are you willing to have a blood transfusion if it is medically needed before, during, or after your surgery? Yes   13. Have you or any of your relatives ever had problems with anesthesia? No   14. Do you have sleep apnea, excessive snoring or daytime drowsiness? No   15. Do you have any artifical heart valves or other implanted medical devices like a pacemaker, defibrillator, or continuous glucose monitor? No   16. Do you have artificial joints? No   17. Are you allergic to latex? No     Health Care Directive:  Patient does not have a Health Care Directive or Living Will: Discussed advance care planning with  patient; information given to patient to review.    Preoperative Review of :   reviewed - no record of controlled substances prescribed.      Status of Chronic Conditions:  See problem list for active medical problems.  Problems all longstanding and stable, except as noted/documented.  See ROS for pertinent symptoms related to these conditions.    Establish Care  Previous provider Dr Dustin Akbar     Dementia with behaviors   Live at [facility]   Current treatment with Seroquel 900 mg at bedtime       HYPERLIPIDEMIA - Patient has a long history of significant Hyperlipidemia requiring medication for treatment with recent good control. Patient reports no problems or side effects with the medication.     Hyperlipidemia Follow-Up      Are you regularly taking any medication or supplement to lower your cholesterol?   Yes- Zocor    Are you having muscle aches or other side effects that you think could be caused by your cholesterol lowering medication?  No    HYPOTHYROIDISM - Patient has a longstanding history of chronic Hypothyroidism. Patient has been doing well, noting no tremor, insomnia, hair loss or changes in skin texture. Continues to take medications as directed, without adverse reactions or side effects. Last TSH   Hypothyroidism Follow-up      Since last visit, patient describes the following symptoms: Weight stable, no hair loss, no skin changes, no constipation, no loose stools    Lab Results   Component Value Date    TSH 1.52 04/29/2021   .        Review of Systems  CONSTITUTIONAL: NEGATIVE for fever, chills, change in weight  INTEGUMENTARY/SKIN: NEGATIVE for worrisome rashes, moles or lesions  EYES: NEGATIVE for vision changes or irritation  ENT/MOUTH: NEGATIVE for ear, mouth and throat problems  RESP: NEGATIVE for significant cough or SOB  CV: NEGATIVE for chest pain, palpitations or peripheral edema  GI: NEGATIVE for nausea, abdominal pain, heartburn, or change in bowel habits  : NEGATIVE for  frequency, dysuria, or hematuria  MUSCULOSKELETAL:right knee pain   NEURO: NEGATIVE for weakness, dizziness or paresthesias  ENDOCRINE: Hx thyroid disease  HEME: NEGATIVE for bleeding problems  PSYCHIATRIC: NEGATIVE for changes in mood or affect    Patient Active Problem List    Diagnosis Date Noted     Screening for colon cancer 06/02/2021     Priority: Medium     Added automatically from request for surgery [id number]       Bright red blood per rectum 05/19/2021     Priority: Medium     Added automatically from request for surgery [id number]       Chronic pain of right knee 06/28/2019     Priority: Medium     Hypercholesterolemia 10/04/2018     Priority: Medium     ACP (advance care planning) 07/10/2017     Priority: Medium     Advance Care Planning 7/10/2017: ACP Review of Chart / Resources Provided:  Reviewed chart for advance care plan.  Christiano Leon has no plan or code status on file. Discussed available resources and provided with information.   Added by TERRENCE MARTÍNEZ             Acquired hypothyroidism 07/10/2017     Priority: Medium     Dementia with behavioral disturbance, unspecified dementia type (H) 07/10/2017     Priority: Medium     Alcohol use disorder 07/10/2017     Priority: Medium      Past Medical History:   Diagnosis Date     BPH (benign prostatic hyperplasia)      Poor dentition      Past Surgical History:   Procedure Laterality Date     Bilateral cataract       COLONOSCOPY N/A 7/22/2021    Procedure: Colonoscopy;  Repeat colonoscopy due to incomplete colonoscopy;  Surgeon: Silvio Guzmán MD;  Location: [facility]     ENDOSCOPY UPPER, COLONOSCOPY, COMBINED N/A 5/24/2021    Procedure: colonoscopy attempted and upper endoscopy with biopsies;  Surgeon: Silvio Guzmán MD;  Location: [facility] OR     ESOPHAGOSCOPY, GASTROSCOPY, DUODENOSCOPY (EGD), COMBINED N/A 5/10/2019    Procedure: UPPER ENDOSCOPY WITH BIOPSIES;  Surgeon: Silvio Guzmán MD;  Location: [facility] OR     HERNIA REPAIR Right 2007?     Right knee  meniscectomy Right      Current Outpatient Medications   Medication Sig Dispense Refill     Acetaminophen (TYLENOL PO) Take 1,000 mg by mouth every 6 hours as needed for mild pain or fever       docusate sodium (COLACE) 100 MG capsule TAKE 2 CAPSULES (200 MG) BYMOUTH 2 TIMES DAILY 112 capsule 4     levothyroxine (SYNTHROID/LEVOTHROID) 50 MCG tablet Take 1 tablet (50 mcg) by mouth daily 28 tablet 4     pantoprazole (PROTONIX) 40 MG EC tablet TAKE 1 TABLET (40 MG) BY MOUTH DAILY 28 tablet 0     QUEtiapine (SEROQUEL) 300 MG tablet TAKE 3 TABLETS BY MOUTH DAILY. 84 tablet 4     simvastatin (ZOCOR) 40 MG tablet TAKE ONE TABLET BY MOUTH ATBEDTIME. 28 tablet 4     VITAMIN D3 25 MCG (1000 UT) tablet TAKE 1 TABLET (25 MCG) BY MOUTH DAILY 28 tablet 4     carbamide peroxide (DEBROX) 6.5 % otic solution Place 5 drops Into the left ear 2 times daily Do this once a month for 4 days each time, for the next 6 months (Patient not taking: Reported on 3/18/2022) 30 mL 0     dextromethorphan HBr 15 MG/5ML LIQD Take 15 mLs by mouth Take 2 tsp every 4 hours as needed for cough (Patient not taking: Reported on 3/18/2022)       loperamide (IMODIUM) 2 MG capsule Take 2 mg by mouth 4 times daily as needed for diarrhea (Patient not taking: Reported on 3/18/2022)       magnesium hydroxide (MILK OF MAGNESIA) 400 MG/5ML suspension Take 30 mLs by mouth daily as needed for constipation or heartburn (Patient not taking: Reported on 3/18/2022)         Allergies   Allergen Reactions     Nickel      Possible. Cheap metal.     Penicillins      Seasonal Allergies      Perfumes        Social History     Tobacco Use     Smoking status: Former Smoker     Packs/day: 0.50     Types: Cigarettes     Quit date: 1993     Years since quittin.8     Smokeless tobacco: Never Used   Substance Use Topics     Alcohol use: No     No family history on file.  History   Drug Use Not on file         Objective     /76   Pulse 80   Temp 96.8  F (36  C)  "(Tympanic)   Ht 1.803 m (5' 11\")   Wt 77.1 kg (170 lb)   SpO2 94%   BMI 23.71 kg/m      Physical Exam    GENERAL APPEARANCE: alert, no distress and cooperative     EYES: EOMI,  PERRL     HENT: nose and mouth without ulcers or lesions and left ear canal some drainage      NECK: no adenopathy, no asymmetry, masses, or scars and thyroid normal to palpation     RESP: lungs clear to auscultation - no rales, rhonchi or wheezes     CV: regular rates and rhythm, normal S1 S2, no S3 or S4 and no murmur, click or rub     ABDOMEN:  soft, nontender, no HSM or masses and bowel sounds normal     MS: right knee pain     SKIN: no suspicious lesions or rashes     NEURO: speech normal and cranial nerves 2-12 intact     PSYCH: mentation appears normal. and affect normal/bright     LYMPHATICS: No cervical adenopathy    Recent Labs   Lab Test 04/29/21  1537 12/21/20  1515   HGB 13.4 12.6*    174    142   POTASSIUM 4.1 3.8   CR 1.00 0.89        Diagnostics:  Recent Results (from the past 24 hour(s))   Comprehensive metabolic panel (BMP + Alb, Alk Phos, ALT, AST, Total. Bili, TP)    Collection Time: 03/18/22  1:48 PM   Result Value Ref Range    Sodium 141 133 - 144 mmol/L    Potassium 4.2 3.4 - 5.3 mmol/L    Chloride 110 (H) 94 - 109 mmol/L    Carbon Dioxide (CO2) 27 20 - 32 mmol/L    Anion Gap 4 3 - 14 mmol/L    Urea Nitrogen 14 7 - 30 mg/dL    Creatinine 1.09 0.66 - 1.25 mg/dL    Calcium 8.6 8.5 - 10.1 mg/dL    Glucose 103 (H) 70 - 99 mg/dL    Alkaline Phosphatase 82 40 - 150 U/L    AST 19 0 - 45 U/L    ALT 20 0 - 70 U/L    Protein Total 7.1 6.8 - 8.8 g/dL    Albumin 3.5 3.4 - 5.0 g/dL    Bilirubin Total 0.5 0.2 - 1.3 mg/dL    GFR Estimate 69 >60 mL/min/1.73m2   TSH with free T4 reflex    Collection Time: 03/18/22  1:48 PM   Result Value Ref Range    TSH 0.99 0.40 - 4.00 mU/L   CBC with platelets and differential    Collection Time: 03/18/22  1:48 PM   Result Value Ref Range    WBC Count 4.9 4.0 - 11.0 10e3/uL    RBC " Count 4.63 4.40 - 5.90 10e6/uL    Hemoglobin 13.6 13.3 - 17.7 g/dL    Hematocrit 41.7 40.0 - 53.0 %    MCV 90 78 - 100 fL    MCH 29.4 26.5 - 33.0 pg    MCHC 32.6 31.5 - 36.5 g/dL    RDW 13.6 10.0 - 15.0 %    Platelet Count 171 150 - 450 10e3/uL    % Neutrophils 68 %    % Lymphocytes 21 %    % Monocytes 10 %    % Eosinophils 1 %    % Basophils 0 %    % Immature Granulocytes 0 %    NRBCs per 100 WBC 0 <1 /100    Absolute Neutrophils 3.3 1.6 - 8.3 10e3/uL    Absolute Lymphocytes 1.0 0.8 - 5.3 10e3/uL    Absolute Monocytes 0.5 0.0 - 1.3 10e3/uL    Absolute Eosinophils 0.1 0.0 - 0.7 10e3/uL    Absolute Basophils 0.0 0.0 - 0.2 10e3/uL    Absolute Immature Granulocytes 0.0 <=0.4 10e3/uL    Absolute NRBCs 0.0 10e3/uL      EKG: appears normal, NSR, normal axis, normal intervals, no acute ST/T changes c/w ischemia, no LVH by voltage criteria    Revised Cardiac Risk Index (RCRI):  The patient has the following serious cardiovascular risks for perioperative complications:   - No serious cardiac risks = 0 points     RCRI Interpretation: 1 point: Class II (low risk - 0.9% complication rate)           Signed Electronically by: ZANE Collins CNP  Copy of this evaluation report is provided to requesting physician.

## 2022-03-23 ENCOUNTER — ANESTHESIA EVENT (OUTPATIENT)
Dept: SURGERY | Facility: HOSPITAL | Age: 77
End: 2022-03-23
Payer: MEDICARE

## 2022-03-23 ASSESSMENT — LIFESTYLE VARIABLES: TOBACCO_USE: 1

## 2022-03-23 NOTE — ANESTHESIA PREPROCEDURE EVALUATION
Anesthesia Pre-Procedure Evaluation    Patient: Christiano Leon   MRN: [id number] : 1945        Procedure : Procedure(s):  RIGHT TOTAL KNEE ARTHROPLASTY          Past Medical History:   Diagnosis Date     BPH (benign prostatic hyperplasia)      Poor dentition       Past Surgical History:   Procedure Laterality Date     Bilateral cataract       COLONOSCOPY N/A 2021    Procedure: Colonoscopy;  Repeat colonoscopy due to incomplete colonoscopy;  Surgeon: Silvio Guzmán MD;  Location: [facility]     ENDOSCOPY UPPER, COLONOSCOPY, COMBINED N/A 2021    Procedure: colonoscopy attempted and upper endoscopy with biopsies;  Surgeon: Silvio Guzmán MD;  Location: [facility]     ESOPHAGOSCOPY, GASTROSCOPY, DUODENOSCOPY (EGD), COMBINED N/A 5/10/2019    Procedure: UPPER ENDOSCOPY WITH BIOPSIES;  Surgeon: Silvio Guzmán MD;  Location: [facility]     HERNIA REPAIR Right ?     Right knee meniscectomy Right       Allergies   Allergen Reactions     Nickel      Possible. Cheap metal.     Penicillins      Seasonal Allergies      Perfumes      Social History     Tobacco Use     Smoking status: Former Smoker     Packs/day: 0.50     Types: Cigarettes     Quit date: 1993     Years since quittin.8     Smokeless tobacco: Never Used   Substance Use Topics     Alcohol use: No      Wt Readings from Last 1 Encounters:   22 77.1 kg (170 lb)        Anesthesia Evaluation   Pt has had prior anesthetic. Type: General.    No history of anesthetic complications       ROS/MED HX  ENT/Pulmonary:     (+) tobacco use, Past use,     Neurologic:     (+) dementia,     Cardiovascular:     (+) Dyslipidemia -----Previous cardiac testing   Echo: Date: Results:    Stress Test: Date: Results:    ECG Reviewed: Date: 3/18/22 Results:  Appears normal, NSR, normal axis, normal intervals, no acute ST/T changes c/w ischemia, no LVH by voltage criteria  Cath: Date: Results:      METS/Exercise Tolerance: >4 METS    Hematologic:  - neg hematologic  ROS      Musculoskeletal:   (+) arthritis,     GI/Hepatic:     (+) GERD, Asymptomatic on medication, bowel prep,     Renal/Genitourinary:     (+) BPH,     Endo:     (+) thyroid problem, hypothyroidism,     Psychiatric/Substance Use: Comment: Dementia    (+) alcohol abuse     Infectious Disease:  - neg infectious disease ROS     Malignancy:  - neg malignancy ROS     Other:  - neg other ROS    (+) , H/O Chronic Pain,        Physical Exam    Airway  airway exam normal      Mallampati: III   TM distance: > 3 FB   Neck ROM: full   Mouth opening: > 3 cm    Respiratory Devices and Support         Dental  no notable dental history     (+) lower dentures and upper dentures      Cardiovascular   cardiovascular exam normal       Rhythm and rate: regular and normal     Pulmonary   pulmonary exam normal        breath sounds clear to auscultation           OUTSIDE LABS:  CBC:   Lab Results   Component Value Date    WBC 4.9 03/18/2022    WBC 5.5 04/29/2021    HGB 13.6 03/18/2022    HGB 13.4 04/29/2021    HCT 41.7 03/18/2022    HCT 41.4 04/29/2021     03/18/2022     04/29/2021     BMP:   Lab Results   Component Value Date     03/18/2022     04/29/2021    POTASSIUM 4.2 03/18/2022    POTASSIUM 4.1 04/29/2021    CHLORIDE 110 (H) 03/18/2022    CHLORIDE 110 (H) 04/29/2021    CO2 27 03/18/2022    CO2 29 04/29/2021    BUN 14 03/18/2022    BUN 19 04/29/2021    CR 1.09 03/18/2022    CR 1.00 04/29/2021     (H) 03/18/2022    GLC 99 04/29/2021     COAGS: No results found for: PTT, INR, FIBR  POC: No results found for: BGM, HCG, HCGS  HEPATIC:   Lab Results   Component Value Date    ALBUMIN 3.5 03/18/2022    PROTTOTAL 7.1 03/18/2022    ALT 20 03/18/2022    AST 19 03/18/2022    ALKPHOS 82 03/18/2022    BILITOTAL 0.5 03/18/2022     OTHER:   Lab Results   Component Value Date    A1C 5.8 (H) 09/11/2018    GAYLE 8.6 03/18/2022    MAG 2.1 12/21/2020    TSH 0.99 03/18/2022    CRP 4.4 03/02/2018    SED 11 03/02/2018        Anesthesia Plan    ASA Status:  3   NPO Status:  NPO Appropriate    Anesthesia Type: Spinal.   Induction: N/a.           Consents    Anesthesia Plan(s) and associated risks, benefits, and realistic alternatives discussed. Questions answered and patient/representative(s) expressed understanding.     - Discussed: Risks, Benefits and Alternatives for BOTH SEDATION and the PROCEDURE were discussed     - Discussed with:  Patient      - Extended Intubation/Ventilatory Support Discussed: No.      - Patient is DNR/DNI Status: No    Use of blood products discussed: No .     Postoperative Care    Pain management: Neuraxial analgesia, Peripheral nerve block (Single Shot).        Comments:    Other Comments:  3/18/22            ZANE Patel CRNA

## 2022-03-25 ENCOUNTER — TRANSFERRED RECORDS (OUTPATIENT)
Dept: HEALTH INFORMATION MANAGEMENT | Facility: HOSPITAL | Age: 77
End: 2022-03-25
Payer: MEDICARE

## 2022-03-25 NOTE — OR NURSING
email sent to total joint replacement team as follows;    We have Christiano Leon : 41 coming  for RTKA with Dr. Dobson, PCP Lexa ROSAS.  Christiano lives at [facility] and lives independently with supervision.  Christiano received the total joint replacement education packet and shared information with nurse at [facility].  They are aware Christiano will stay overnight and return to them the following day of surgery.  They are trying to arrange home PT for Christiano but need Dr. Dobson to send an order with Christiano and they will follow up with their arrangements.  [facility] will have a nurse on-call over the weekend and she is available at [phone] for discharge instructions.  Christiano lives on first floor and does not have any stairs to manage and bathroom is handicap accessible.    Reviewed total joint replacement post-op care with [facility] nurse Lenka.  She verbalized good understanding.    Thank you,  Aarti Parrish R.N.    [facility]est[facility]   [address] [place] [id number]   [email or web address]  Office: [phone]

## 2022-03-28 ENCOUNTER — OFFICE VISIT (OUTPATIENT)
Dept: FAMILY MEDICINE | Facility: OTHER | Age: 77
End: 2022-03-28
Attending: SPECIALIST
Payer: MEDICARE

## 2022-03-28 DIAGNOSIS — Z01.818 PREOP GENERAL PHYSICAL EXAM: Primary | ICD-10-CM

## 2022-03-28 PROCEDURE — U0005 INFEC AGEN DETEC AMPLI PROBE: HCPCS | Mod: ZL

## 2022-03-29 LAB — SARS-COV-2 RNA RESP QL NAA+PROBE: NEGATIVE

## 2022-04-01 ENCOUNTER — HOSPITAL ENCOUNTER (OUTPATIENT)
Facility: HOSPITAL | Age: 77
Discharge: NURSING, REHAB OR LONG-TERM CARE | End: 2022-04-02
Attending: SPECIALIST | Admitting: SPECIALIST
Payer: MEDICARE

## 2022-04-01 ENCOUNTER — TELEPHONE (OUTPATIENT)
Dept: FAMILY MEDICINE | Facility: OTHER | Age: 77
End: 2022-04-01

## 2022-04-01 ENCOUNTER — APPOINTMENT (OUTPATIENT)
Dept: GENERAL RADIOLOGY | Facility: HOSPITAL | Age: 77
End: 2022-04-01
Attending: PHYSICIAN ASSISTANT
Payer: MEDICARE

## 2022-04-01 ENCOUNTER — ANESTHESIA (OUTPATIENT)
Dept: SURGERY | Facility: HOSPITAL | Age: 77
End: 2022-04-01
Payer: MEDICARE

## 2022-04-01 ENCOUNTER — APPOINTMENT (OUTPATIENT)
Dept: PHYSICAL THERAPY | Facility: HOSPITAL | Age: 77
End: 2022-04-01
Attending: PHYSICIAN ASSISTANT
Payer: MEDICARE

## 2022-04-01 DIAGNOSIS — M25.561 CHRONIC PAIN OF RIGHT KNEE: Primary | ICD-10-CM

## 2022-04-01 DIAGNOSIS — G89.29 CHRONIC PAIN OF RIGHT KNEE: Primary | ICD-10-CM

## 2022-04-01 PROCEDURE — 999N000157 HC STATISTIC RCP TIME EA 10 MIN

## 2022-04-01 PROCEDURE — 99218 PR INITIAL OBSERVATION CARE,LEVEL I: CPT | Mod: 24 | Performed by: INTERNAL MEDICINE

## 2022-04-01 PROCEDURE — C1776 JOINT DEVICE (IMPLANTABLE): HCPCS | Performed by: SPECIALIST

## 2022-04-01 PROCEDURE — 97161 PT EVAL LOW COMPLEX 20 MIN: CPT | Mod: GP | Performed by: PHYSICAL THERAPIST

## 2022-04-01 PROCEDURE — 27447 TOTAL KNEE ARTHROPLASTY: CPT | Performed by: NURSE ANESTHETIST, CERTIFIED REGISTERED

## 2022-04-01 PROCEDURE — 64447 NJX AA&/STRD FEMORAL NRV IMG: CPT | Mod: XU | Performed by: NURSE ANESTHETIST, CERTIFIED REGISTERED

## 2022-04-01 PROCEDURE — 370N000017 HC ANESTHESIA TECHNICAL FEE, PER MIN: Performed by: SPECIALIST

## 2022-04-01 PROCEDURE — 88300 SURGICAL PATH GROSS: CPT | Mod: TC | Performed by: SPECIALIST

## 2022-04-01 PROCEDURE — C1713 ANCHOR/SCREW BN/BN,TIS/BN: HCPCS | Performed by: SPECIALIST

## 2022-04-01 PROCEDURE — 258N000003 HC RX IP 258 OP 636: Performed by: PHYSICIAN ASSISTANT

## 2022-04-01 PROCEDURE — 99100 ANES PT EXTEME AGE<1 YR&>70: CPT | Performed by: NURSE ANESTHETIST, CERTIFIED REGISTERED

## 2022-04-01 PROCEDURE — 250N000011 HC RX IP 250 OP 636: Performed by: NURSE ANESTHETIST, CERTIFIED REGISTERED

## 2022-04-01 PROCEDURE — 250N000011 HC RX IP 250 OP 636: Performed by: INTERNAL MEDICINE

## 2022-04-01 PROCEDURE — 258N000003 HC RX IP 258 OP 636: Performed by: NURSE ANESTHETIST, CERTIFIED REGISTERED

## 2022-04-01 PROCEDURE — 76942 ECHO GUIDE FOR BIOPSY: CPT | Mod: 26 | Performed by: NURSE ANESTHETIST, CERTIFIED REGISTERED

## 2022-04-01 PROCEDURE — 272N000001 HC OR GENERAL SUPPLY STERILE: Performed by: SPECIALIST

## 2022-04-01 PROCEDURE — 999N000141 HC STATISTIC PRE-PROCEDURE NURSING ASSESSMENT: Performed by: SPECIALIST

## 2022-04-01 PROCEDURE — 250N000011 HC RX IP 250 OP 636: Performed by: PHYSICIAN ASSISTANT

## 2022-04-01 PROCEDURE — C9290 INJ, BUPIVACAINE LIPOSOME: HCPCS | Performed by: SPECIALIST

## 2022-04-01 PROCEDURE — 999N000065 XR KNEE PORT RIGHT 1/2 VIEWS: Mod: RT

## 2022-04-01 PROCEDURE — 278N000051 HC OR IMPLANT GENERAL: Performed by: SPECIALIST

## 2022-04-01 PROCEDURE — 250N000011 HC RX IP 250 OP 636: Performed by: SPECIALIST

## 2022-04-01 PROCEDURE — 250N000009 HC RX 250: Performed by: NURSE ANESTHETIST, CERTIFIED REGISTERED

## 2022-04-01 PROCEDURE — 710N000010 HC RECOVERY PHASE 1, LEVEL 2, PER MIN: Performed by: SPECIALIST

## 2022-04-01 PROCEDURE — 27447 TOTAL KNEE ARTHROPLASTY: CPT | Mod: AS | Performed by: PHYSICIAN ASSISTANT

## 2022-04-01 PROCEDURE — 250N000009 HC RX 250: Performed by: SPECIALIST

## 2022-04-01 PROCEDURE — 64445 NJX AA&/STRD SCIATIC NRV IMG: CPT | Mod: XU | Performed by: NURSE ANESTHETIST, CERTIFIED REGISTERED

## 2022-04-01 PROCEDURE — 97530 THERAPEUTIC ACTIVITIES: CPT | Mod: GP | Performed by: PHYSICAL THERAPIST

## 2022-04-01 PROCEDURE — 250N000013 HC RX MED GY IP 250 OP 250 PS 637: Performed by: PHYSICIAN ASSISTANT

## 2022-04-01 PROCEDURE — 250N000025 HC SEVOFLURANE, PER MIN: Performed by: SPECIALIST

## 2022-04-01 PROCEDURE — 27447 TOTAL KNEE ARTHROPLASTY: CPT | Mod: RT | Performed by: SPECIALIST

## 2022-04-01 PROCEDURE — 360N000078 HC SURGERY LEVEL 5, PER MIN: Performed by: SPECIALIST

## 2022-04-01 DEVICE — IMPLANTABLE DEVICE: Type: IMPLANTABLE DEVICE | Site: KNEE | Status: FUNCTIONAL

## 2022-04-01 DEVICE — SCREW-FEMALE HEX 2.5MM/25MM LENGTH: Type: IMPLANTABLE DEVICE | Site: KNEE | Status: FUNCTIONAL

## 2022-04-01 DEVICE — BONE CEMENT-SIMPLEX: Type: IMPLANTABLE DEVICE | Site: KNEE | Status: FUNCTIONAL

## 2022-04-01 RX ORDER — TRANEXAMIC ACID 10 MG/ML
1 INJECTION, SOLUTION INTRAVENOUS ONCE
Status: DISCONTINUED | OUTPATIENT
Start: 2022-04-01 | End: 2022-04-01 | Stop reason: HOSPADM

## 2022-04-01 RX ORDER — SODIUM CHLORIDE, SODIUM LACTATE, POTASSIUM CHLORIDE, CALCIUM CHLORIDE 600; 310; 30; 20 MG/100ML; MG/100ML; MG/100ML; MG/100ML
INJECTION, SOLUTION INTRAVENOUS CONTINUOUS
Status: DISCONTINUED | OUTPATIENT
Start: 2022-04-01 | End: 2022-04-01 | Stop reason: HOSPADM

## 2022-04-01 RX ORDER — GUAIFENESIN AND DEXTROMETHORPHAN HYDROBROMIDE 100; 10 MG/5ML; MG/5ML
10 SOLUTION ORAL EVERY 4 HOURS PRN
COMMUNITY
End: 2023-01-04

## 2022-04-01 RX ORDER — KETOROLAC TROMETHAMINE 30 MG/ML
15 INJECTION, SOLUTION INTRAMUSCULAR; INTRAVENOUS ONCE
Status: COMPLETED | OUTPATIENT
Start: 2022-04-01 | End: 2022-04-01

## 2022-04-01 RX ORDER — FENTANYL CITRATE 50 UG/ML
INJECTION, SOLUTION INTRAMUSCULAR; INTRAVENOUS PRN
Status: DISCONTINUED | OUTPATIENT
Start: 2022-04-01 | End: 2022-04-01

## 2022-04-01 RX ORDER — BUPIVACAINE HYDROCHLORIDE 2.5 MG/ML
INJECTION, SOLUTION INFILTRATION; PERINEURAL PRN
Status: DISCONTINUED | OUTPATIENT
Start: 2022-04-01 | End: 2022-04-02 | Stop reason: HOSPADM

## 2022-04-01 RX ORDER — POLYETHYLENE GLYCOL 3350 17 G/17G
17 POWDER, FOR SOLUTION ORAL DAILY
Status: DISCONTINUED | OUTPATIENT
Start: 2022-04-02 | End: 2022-04-02 | Stop reason: HOSPADM

## 2022-04-01 RX ORDER — NALOXONE HYDROCHLORIDE 0.4 MG/ML
0.2 INJECTION, SOLUTION INTRAMUSCULAR; INTRAVENOUS; SUBCUTANEOUS
Status: DISCONTINUED | OUTPATIENT
Start: 2022-04-01 | End: 2022-04-02 | Stop reason: HOSPADM

## 2022-04-01 RX ORDER — LIDOCAINE 40 MG/G
CREAM TOPICAL
Status: DISCONTINUED | OUTPATIENT
Start: 2022-04-01 | End: 2022-04-02 | Stop reason: HOSPADM

## 2022-04-01 RX ORDER — NALOXONE HYDROCHLORIDE 0.4 MG/ML
0.4 INJECTION, SOLUTION INTRAMUSCULAR; INTRAVENOUS; SUBCUTANEOUS
Status: DISCONTINUED | OUTPATIENT
Start: 2022-04-01 | End: 2022-04-02 | Stop reason: HOSPADM

## 2022-04-01 RX ORDER — ACETAMINOPHEN 325 MG/1
650 TABLET ORAL EVERY 4 HOURS PRN
Status: DISCONTINUED | OUTPATIENT
Start: 2022-04-04 | End: 2022-04-02 | Stop reason: HOSPADM

## 2022-04-01 RX ORDER — VITAMINS A AND D OINTMENT 15.5; 53.4 G/100G; G/100G
OINTMENT TOPICAL PRN
COMMUNITY

## 2022-04-01 RX ORDER — ACETAMINOPHEN 10 MG/ML
INJECTION, SOLUTION INTRAVENOUS PRN
Status: DISCONTINUED | OUTPATIENT
Start: 2022-04-01 | End: 2022-04-01

## 2022-04-01 RX ORDER — HYDRALAZINE HYDROCHLORIDE 20 MG/ML
2.5-5 INJECTION INTRAMUSCULAR; INTRAVENOUS EVERY 10 MIN PRN
Status: DISCONTINUED | OUTPATIENT
Start: 2022-04-01 | End: 2022-04-01 | Stop reason: HOSPADM

## 2022-04-01 RX ORDER — QUETIAPINE FUMARATE 25 MG/1
25 TABLET, FILM COATED ORAL 2 TIMES DAILY PRN
COMMUNITY
End: 2024-04-30

## 2022-04-01 RX ORDER — TRAMADOL HYDROCHLORIDE 50 MG/1
50 TABLET ORAL EVERY 6 HOURS PRN
Status: DISCONTINUED | OUTPATIENT
Start: 2022-04-01 | End: 2022-04-02 | Stop reason: HOSPADM

## 2022-04-01 RX ORDER — ONDANSETRON 2 MG/ML
4 INJECTION INTRAMUSCULAR; INTRAVENOUS EVERY 6 HOURS PRN
Status: DISCONTINUED | OUTPATIENT
Start: 2022-04-01 | End: 2022-04-02

## 2022-04-01 RX ORDER — ONDANSETRON 2 MG/ML
4 INJECTION INTRAMUSCULAR; INTRAVENOUS EVERY 30 MIN PRN
Status: DISCONTINUED | OUTPATIENT
Start: 2022-04-01 | End: 2022-04-01 | Stop reason: HOSPADM

## 2022-04-01 RX ORDER — PROPOFOL 10 MG/ML
INJECTION, EMULSION INTRAVENOUS PRN
Status: DISCONTINUED | OUTPATIENT
Start: 2022-04-01 | End: 2022-04-01

## 2022-04-01 RX ORDER — MAGNESIUM HYDROXIDE/ALUMINUM HYDROXICE/SIMETHICONE 120; 1200; 1200 MG/30ML; MG/30ML; MG/30ML
30 SUSPENSION ORAL EVERY 4 HOURS PRN
COMMUNITY

## 2022-04-01 RX ORDER — SODIUM CHLORIDE, SODIUM LACTATE, POTASSIUM CHLORIDE, CALCIUM CHLORIDE 600; 310; 30; 20 MG/100ML; MG/100ML; MG/100ML; MG/100ML
INJECTION, SOLUTION INTRAVENOUS CONTINUOUS
Status: DISCONTINUED | OUTPATIENT
Start: 2022-04-01 | End: 2022-04-02

## 2022-04-01 RX ORDER — LABETALOL 20 MG/4 ML (5 MG/ML) INTRAVENOUS SYRINGE
10
Status: DISCONTINUED | OUTPATIENT
Start: 2022-04-01 | End: 2022-04-01 | Stop reason: HOSPADM

## 2022-04-01 RX ORDER — BUPIVACAINE HYDROCHLORIDE 2.5 MG/ML
INJECTION, SOLUTION EPIDURAL; INFILTRATION; INTRACAUDAL PRN
Status: DISCONTINUED | OUTPATIENT
Start: 2022-04-01 | End: 2022-04-01

## 2022-04-01 RX ORDER — GINSENG 100 MG
CAPSULE ORAL DAILY PRN
COMMUNITY
End: 2023-01-04

## 2022-04-01 RX ORDER — BISACODYL 10 MG
10 SUPPOSITORY, RECTAL RECTAL DAILY PRN
COMMUNITY

## 2022-04-01 RX ORDER — LIDOCAINE 40 MG/G
CREAM TOPICAL
Status: DISCONTINUED | OUTPATIENT
Start: 2022-04-01 | End: 2022-04-01 | Stop reason: HOSPADM

## 2022-04-01 RX ORDER — ONDANSETRON 4 MG/1
4 TABLET, ORALLY DISINTEGRATING ORAL EVERY 30 MIN PRN
Status: DISCONTINUED | OUTPATIENT
Start: 2022-04-01 | End: 2022-04-01 | Stop reason: HOSPADM

## 2022-04-01 RX ORDER — HYDROMORPHONE HCL IN WATER/PF 6 MG/30 ML
0.4 PATIENT CONTROLLED ANALGESIA SYRINGE INTRAVENOUS
Status: DISCONTINUED | OUTPATIENT
Start: 2022-04-01 | End: 2022-04-02 | Stop reason: HOSPADM

## 2022-04-01 RX ORDER — ACETAMINOPHEN 500 MG
1000 TABLET ORAL EVERY 6 HOURS PRN
COMMUNITY

## 2022-04-01 RX ORDER — SODIUM PHOSPHATE, DIBASIC AND SODIUM PHOSPHATE, MONOBASIC, UNSPECIFIED FORM 7; 19 G/118ML; G/118ML
1 ENEMA RECTAL DAILY PRN
COMMUNITY

## 2022-04-01 RX ORDER — ACETAMINOPHEN 10 MG/ML
INJECTION, SOLUTION INTRAVENOUS
Status: COMPLETED
Start: 2022-04-01 | End: 2022-04-01

## 2022-04-01 RX ORDER — ALBUTEROL SULFATE 0.83 MG/ML
2.5 SOLUTION RESPIRATORY (INHALATION) EVERY 4 HOURS PRN
Status: DISCONTINUED | OUTPATIENT
Start: 2022-04-01 | End: 2022-04-01 | Stop reason: HOSPADM

## 2022-04-01 RX ORDER — ONDANSETRON 4 MG/1
4 TABLET, ORALLY DISINTEGRATING ORAL EVERY 6 HOURS PRN
Status: DISCONTINUED | OUTPATIENT
Start: 2022-04-01 | End: 2022-04-02

## 2022-04-01 RX ORDER — CEFAZOLIN SODIUM 2 G/100ML
2 INJECTION, SOLUTION INTRAVENOUS
Status: COMPLETED | OUTPATIENT
Start: 2022-04-01 | End: 2022-04-01

## 2022-04-01 RX ORDER — AMOXICILLIN 250 MG
1 CAPSULE ORAL 2 TIMES DAILY
Status: DISCONTINUED | OUTPATIENT
Start: 2022-04-01 | End: 2022-04-02 | Stop reason: HOSPADM

## 2022-04-01 RX ORDER — HYDRALAZINE HYDROCHLORIDE 20 MG/ML
10 INJECTION INTRAMUSCULAR; INTRAVENOUS EVERY 6 HOURS PRN
Status: DISCONTINUED | OUTPATIENT
Start: 2022-04-01 | End: 2022-04-02 | Stop reason: HOSPADM

## 2022-04-01 RX ORDER — QUETIAPINE FUMARATE 300 MG/1
900 TABLET, FILM COATED ORAL AT BEDTIME
COMMUNITY
End: 2023-10-02

## 2022-04-01 RX ORDER — BUPIVACAINE HYDROCHLORIDE 7.5 MG/ML
INJECTION, SOLUTION INTRASPINAL PRN
Status: DISCONTINUED | OUTPATIENT
Start: 2022-04-01 | End: 2022-04-01

## 2022-04-01 RX ORDER — CEFAZOLIN SODIUM 1 G/50ML
1 INJECTION, SOLUTION INTRAVENOUS EVERY 8 HOURS
Status: COMPLETED | OUTPATIENT
Start: 2022-04-01 | End: 2022-04-02

## 2022-04-01 RX ORDER — FENTANYL CITRATE 50 UG/ML
50 INJECTION, SOLUTION INTRAMUSCULAR; INTRAVENOUS EVERY 5 MIN PRN
Status: DISCONTINUED | OUTPATIENT
Start: 2022-04-01 | End: 2022-04-01 | Stop reason: HOSPADM

## 2022-04-01 RX ORDER — CEFAZOLIN SODIUM 2 G/100ML
2 INJECTION, SOLUTION INTRAVENOUS SEE ADMIN INSTRUCTIONS
Status: DISCONTINUED | OUTPATIENT
Start: 2022-04-01 | End: 2022-04-01 | Stop reason: HOSPADM

## 2022-04-01 RX ORDER — HYDROMORPHONE HCL IN WATER/PF 6 MG/30 ML
0.2 PATIENT CONTROLLED ANALGESIA SYRINGE INTRAVENOUS
Status: DISCONTINUED | OUTPATIENT
Start: 2022-04-01 | End: 2022-04-02 | Stop reason: HOSPADM

## 2022-04-01 RX ORDER — ACETAMINOPHEN 325 MG/1
975 TABLET ORAL EVERY 8 HOURS
Status: DISCONTINUED | OUTPATIENT
Start: 2022-04-01 | End: 2022-04-02 | Stop reason: HOSPADM

## 2022-04-01 RX ORDER — BISACODYL 10 MG
10 SUPPOSITORY, RECTAL RECTAL DAILY PRN
Status: DISCONTINUED | OUTPATIENT
Start: 2022-04-01 | End: 2022-04-02 | Stop reason: HOSPADM

## 2022-04-01 RX ORDER — HYDROXYZINE HYDROCHLORIDE 10 MG/1
10 TABLET, FILM COATED ORAL EVERY 6 HOURS PRN
Status: DISCONTINUED | OUTPATIENT
Start: 2022-04-01 | End: 2022-04-02 | Stop reason: HOSPADM

## 2022-04-01 RX ORDER — HYDROMORPHONE HYDROCHLORIDE 1 MG/ML
0.5 INJECTION, SOLUTION INTRAMUSCULAR; INTRAVENOUS; SUBCUTANEOUS EVERY 5 MIN PRN
Status: DISCONTINUED | OUTPATIENT
Start: 2022-04-01 | End: 2022-04-01 | Stop reason: HOSPADM

## 2022-04-01 RX ORDER — PROCHLORPERAZINE MALEATE 5 MG
5 TABLET ORAL EVERY 6 HOURS PRN
Status: DISCONTINUED | OUTPATIENT
Start: 2022-04-01 | End: 2022-04-02 | Stop reason: HOSPADM

## 2022-04-01 RX ADMIN — HYDRALAZINE HYDROCHLORIDE 10 MG: 20 INJECTION, SOLUTION INTRAMUSCULAR; INTRAVENOUS at 22:00

## 2022-04-01 RX ADMIN — TRANEXAMIC ACID 1 G: 1 INJECTION, SOLUTION INTRAVENOUS at 07:53

## 2022-04-01 RX ADMIN — BUPIVACAINE HYDROCHLORIDE IN DEXTROSE 1.8 ML: 7.5 INJECTION, SOLUTION SUBARACHNOID at 07:59

## 2022-04-01 RX ADMIN — FENTANYL CITRATE 25 MCG: 50 INJECTION, SOLUTION INTRAMUSCULAR; INTRAVENOUS at 08:22

## 2022-04-01 RX ADMIN — SENNOSIDES AND DOCUSATE SODIUM 1 TABLET: 50; 8.6 TABLET ORAL at 12:35

## 2022-04-01 RX ADMIN — BUPIVACAINE HYDROCHLORIDE 20 ML: 2.5 INJECTION, SOLUTION EPIDURAL; INFILTRATION; INTRACAUDAL at 07:35

## 2022-04-01 RX ADMIN — CEFAZOLIN SODIUM 2 G: 2 INJECTION, SOLUTION INTRAVENOUS at 08:00

## 2022-04-01 RX ADMIN — FENTANYL CITRATE 50 MCG: 50 INJECTION INTRAMUSCULAR; INTRAVENOUS at 09:59

## 2022-04-01 RX ADMIN — SENNOSIDES AND DOCUSATE SODIUM 1 TABLET: 50; 8.6 TABLET ORAL at 21:34

## 2022-04-01 RX ADMIN — BUPIVACAINE HYDROCHLORIDE 15 ML: 2.5 INJECTION, SOLUTION EPIDURAL; INFILTRATION; INTRACAUDAL at 07:41

## 2022-04-01 RX ADMIN — CEFAZOLIN SODIUM 1 G: 1 INJECTION, SOLUTION INTRAVENOUS at 15:47

## 2022-04-01 RX ADMIN — MIDAZOLAM 0.5 MG: 1 INJECTION INTRAMUSCULAR; INTRAVENOUS at 07:53

## 2022-04-01 RX ADMIN — BUPIVACAINE HYDROCHLORIDE 5 ML: 2.5 INJECTION, SOLUTION EPIDURAL; INFILTRATION; INTRACAUDAL at 10:55

## 2022-04-01 RX ADMIN — ACETAMINOPHEN 325MG 975 MG: 325 TABLET ORAL at 19:04

## 2022-04-01 RX ADMIN — SODIUM CHLORIDE, POTASSIUM CHLORIDE, SODIUM LACTATE AND CALCIUM CHLORIDE: 600; 310; 30; 20 INJECTION, SOLUTION INTRAVENOUS at 07:16

## 2022-04-01 RX ADMIN — ASPIRIN 325 MG: 325 TABLET, COATED ORAL at 12:35

## 2022-04-01 RX ADMIN — FENTANYL CITRATE 25 MCG: 50 INJECTION, SOLUTION INTRAMUSCULAR; INTRAVENOUS at 08:44

## 2022-04-01 RX ADMIN — BUPIVACAINE HYDROCHLORIDE 15 ML: 2.5 INJECTION, SOLUTION EPIDURAL; INFILTRATION; INTRACAUDAL at 07:38

## 2022-04-01 RX ADMIN — KETOROLAC TROMETHAMINE 15 MG: 30 INJECTION, SOLUTION INTRAMUSCULAR at 10:46

## 2022-04-01 RX ADMIN — ACETAMINOPHEN 1000 MG: 10 INJECTION, SOLUTION INTRAVENOUS at 11:28

## 2022-04-01 RX ADMIN — SODIUM CHLORIDE, POTASSIUM CHLORIDE, SODIUM LACTATE AND CALCIUM CHLORIDE: 600; 310; 30; 20 INJECTION, SOLUTION INTRAVENOUS at 12:12

## 2022-04-01 RX ADMIN — PROPOFOL 100 MG: 10 INJECTION, EMULSION INTRAVENOUS at 08:21

## 2022-04-01 RX ADMIN — TRAMADOL HYDROCHLORIDE 50 MG: 50 TABLET, COATED ORAL at 19:04

## 2022-04-01 RX ADMIN — PROPOFOL 10 MG: 10 INJECTION, EMULSION INTRAVENOUS at 07:55

## 2022-04-01 RX ADMIN — HYDRALAZINE HYDROCHLORIDE 10 MG: 20 INJECTION, SOLUTION INTRAMUSCULAR; INTRAVENOUS at 15:54

## 2022-04-01 RX ADMIN — TRANEXAMIC ACID 1 G: 1 INJECTION, SOLUTION INTRAVENOUS at 09:14

## 2022-04-01 RX ADMIN — PROPOFOL 30 MCG/KG/MIN: 10 INJECTION, EMULSION INTRAVENOUS at 08:08

## 2022-04-01 RX ADMIN — TRAMADOL HYDROCHLORIDE 50 MG: 50 TABLET, COATED ORAL at 12:49

## 2022-04-01 NOTE — CARE PLAN
Pt continues to have difficulty controlling pain. After IVP Fentanyl respiratory drive did decrease and O2 required increase.     CRNA bedside to assess pt at this time, ordered toradol IVP to give once and will discuss possible additional block with surgeon.     Will continue to monitor and treat pain as able.

## 2022-04-01 NOTE — PROGRESS NOTES
04/01/22 1423   Quick Adds   Type of Visit Initial PT Evaluation   Living Environment   People in Home facility resident   Current Living Arrangements assisted living   Home Accessibility no concerns   Transportation Anticipated public transportation;health plan transportation   Living Environment Comments Pt is a resident of [facility]   Self-Care   Usual Activity Tolerance moderate   Current Activity Tolerance fair   Equipment Currently Used at Home none   Fall history within last six months no   Activity/Exercise/Self-Care Comment Pt reports he has a SEC but wasn't using.  Pt does not have a FWW   General Information   Onset of Illness/Injury or Date of Surgery 04/01/22   Referring Physician RAFAEL Manning   Patient/Family Therapy Goals Statement (PT) go back to assisted living   Pertinent History of Current Problem (include personal factors and/or comorbidities that impact the POC) Pt s/p R TKA today.  Pt reports he was independent with ADLs prior to surgery.   Cognition   Orientation Status (Cognition) oriented x 3   Pain Assessment   Patient Currently in Pain Yes, see Vital Sign flowsheet  (with movement has pain, at rest no pain)   Integumentary/Edema   Integumentary/Edema Comments incision covered with ace bandage   Range of Motion (ROM)   ROM Comment Grossly 0-30, reports pain with attempts for more flexion   Strength (Manual Muscle Testing)   Strength Comments able to complete SLR on R LE   Bed Mobility   Bed Mobility supine-sit   Supine-Sit Tripp (Bed Mobility) supervision;verbal cues   Impairments Contributing to Impaired Bed Mobility pain;decreased strength   Assistive Device (Bed Mobility) bed rails   Transfers   Transfers sit-stand transfer   Transfer Safety Concerns Noted decreased step length   Impairments Contributing to Impaired Transfers pain;decreased strength   Sit-Stand Transfer   Sit-Stand Tripp (Transfers) minimum assist (75% patient effort)    Assistive Device (Sit-Stand Transfers) walker, front-wheeled   Gait/Stairs (Locomotion)   [place] Level (Gait) contact guard   Assistive Device (Gait) walker, front-wheeled   Distance in Feet (Required for LE Total Joints) 95   Pattern (Gait) step-to   Deviations/Abnormal Patterns (Gait) base of support, narrow;stride length decreased   Maintains Weight-bearing Status (Gait) able to maintain   Comment, (Gait/Stairs) Immobilizer in place per activity orders until POD 1   Balance   Balance Comments good with support from walker   Sensory Examination   Sensory Perception WNL   Clinical Impression   Criteria for Skilled Therapeutic Intervention Yes, treatment indicated   PT Diagnosis (PT) gait disturbance   Influenced by the following impairments pain, decreased strength, decreased ROM   Functional limitations due to impairments decreased tolerance for functional mobility and ADLs   Clinical Presentation (PT Evaluation Complexity) Stable/Uncomplicated   Clinical Presentation Rationale clinical judgement   Clinical Decision Making (Complexity) low complexity   Planned Therapy Interventions (PT) bed mobility training;gait training;home exercise program;patient/family education;ROM (range of motion);strengthening;transfer training   Anticipated Equipment Needs at Discharge (PT) walker, rolling   Risk & Benefits of therapy have been explained care plan/treatment goals reviewed;evaluation/treatment results reviewed;risks/benefits reviewed;current/potential barriers reviewed;participants voiced agreement with care plan;participants included;patient   Clinical Impression Comments PT evaluation completed s/p TKA.  Pt lives at assisted living and was independent without AD prior to surgery.  Pt currently having pain limiting activity tolerance, also has presence of knee immobilizer per activity orders although able to complete SLR.  Pt tolerated ambulation well.  Anticipate pt will be appropriate to discharge back to  assisted living with assist as needed and home PT services.  Pt will also need a FWW at discharge.  Will need to assess mobility tomorrow with immobilizer off.  Will see at least 1 additional session.    PT Discharge Planning   PT Discharge Recommendation (DC Rec) home with assist;home with home care physical therapy   PT Rationale for DC Rec PT evaluation completed s/p TKA.  Pt lives at assisted living and was independent without AD prior to surgery.  Pt currently having pain limiting activity tolerance, also has presence of knee immobilizer per activity orders although able to complete SLR.  Pt tolerated ambulation well.  Anticipate pt will be appropriate to discharge back to assisted living with assist as needed and home PT services.  Pt will also need a FWW at discharge.  Will need to assess mobility tomorrow with immobilizer off.  Will see at least 1 additional session.    PT Brief overview of current status sup>sit SBA with use of L LE to assist R LE to edge, sit>stand CGA, ambulated 95' with FWW CGA with step-to pattern due to immobilizer in place   Plan of Care Review   Plan of Care Reviewed With patient   Total Evaluation Time   Total Evaluation Time (Minutes) 9   Physical Therapy Goals   PT Frequency 6x/week  (2x/day M-F)   PT Predicated Duration/Target Date for Goal Attainment 04/05/22   PT Goals Bed Mobility;Transfers;Gait   PT: Bed Mobility Modified independent;Supine to/from sit   PT: Transfers Modified independent;Sit to/from stand;Bed to/from chair;Assistive device   PT: Gait Modified independent;Rolling walker;Greater than 200 feet

## 2022-04-01 NOTE — ANESTHESIA PROCEDURE NOTES
Other (vastus medialis) Procedure Note    Pre-Procedure   Staff -        CRNA: Tanmay Singh APRN CRNA       Performed By: CRNA       Location: pre-op       Procedure Start/Stop Times: 4/1/2022 10:52 AM and 4/1/2022 10:57 AM       Pre-Anesthestic Checklist: patient identified, IV checked, site marked, risks and benefits discussed, informed consent, monitors and equipment checked, pre-op evaluation, at physician/surgeon's request and post-op pain management  Timeout:       Correct Patient: Yes        Correct Procedure: Yes        Correct Site: Yes        Correct Position: Yes        Correct Laterality: Yes        Site Marked: Yes  Procedure Documentation  Procedure: Other (vastus medialis)       Laterality: right       Patient Position: supine       Skin prep: Chloraprep       Needle Gauge: 20.        Needle Length (Inches): 4        Ultrasound guided       1. Ultrasound was used to identify targeted nerve, plexus, vascular marker, or fascial plane and place a needle adjacent to it in real-time.       2. Ultrasound was used to visualize the spread of anesthetic in close proximity to the above referenced structure.       3. A permanent image is entered into the patient's record.       4. The visualized anatomic structures appeared normal.       5. There were no apparent abnormal pathologic findings.    Assessment/Narrative         The placement was negative for: blood aspirated, painful injection and site bleeding     Bolus given via needle..        Secured via.        Insertion/Infusion Method: Single Shot       Complications: none       Injection made incrementally with aspirations every 5 mL.     Comments:  Risks for regional anesthesia include but not limited to: infection, seizures, continued weakness or numbness, pain at injection site, injury to blood vessels

## 2022-04-01 NOTE — ANESTHESIA PROCEDURE NOTES
Airway       Patient location during procedure: OR       Procedure Start/Stop Times: 4/1/2022 8:22 AM  Staff -        CRNA: Yara Welch APRN CRNA       Performed By: CRNAIndications and Patient Condition       Indications for airway management: johanny-procedural        Final Airway Details       Final airway type: supraglottic airway    Supraglottic Airway Details        Type: LMA       Brand: I-Gel       LMA size: 5    Post intubation assessment        Placement verified by: capnometry, equal breath sounds and chest rise        Number of attempts at approach: 1       Secured with: commercial tube batres and plastic tape       Ease of procedure: easy       Dentition: Unchanged

## 2022-04-01 NOTE — ANESTHESIA PROCEDURE NOTES
Intrathecal injection Procedure Note    Pre-Procedure   Staff -        CRNA: Yara Welch APRN CRNA       Performed By: CRNA       Location: OR       Procedure Start/Stop Times: 4/1/2022 7:55 AM and 4/1/2022 7:59 AM       Pre-Anesthestic Checklist: patient identified, IV checked, risks and benefits discussed, informed consent, monitors and equipment checked, pre-op evaluation, at physician/surgeon's request and post-op pain management  Timeout:       Correct Patient: Yes        Correct Procedure: Yes        Correct Site: Yes        Correct Position: Yes   Procedure Documentation  Procedure: intrathecal injection       Patient Position: sitting       Patient Prep/Sterile Barriers: sterile gloves, mask, patient draped       Skin prep: Betadine       Insertion Site: L3-4. (midline approach).       Needle Gauge: 25.        Needle Length (Inches): 3        Spinal Needle Type: Geovanna tip       Introducer used       Introducer: 20 G      # of attempts: 1 and  # of redirects:     Assessment/Narrative         Paresthesias: No.       CSF fluid: clear.    Medication(s) Administered   Medication Administration Time: 4/1/2022 7:59 AM

## 2022-04-01 NOTE — LETTER
Christiano Leon  [address] [facility] [address]      SURGERY PACKET            Your surgery is scheduled:    Date: 04/01/22  ________________________________    Time: We will call 03/31 afternoon with your admit time.  ________________________________    Please arrive at:  [facility] Admitting [facility] Entrance.  ______________________    Surgeon's Name: Dr. Dobson  _______________________        Pre-Op Physical Fax Numbers:          Pre-Admissions  [phone]        Your surgery is located at:  [address] [facility] [address]         Before Your Surgery  For Patients and Visitors at [facility]    Welcome  As you get ready for surgery, you may have a lot of questions.  This brochure will help you know what to expect before and after surgery.  You and your family are the most important members of your health care team.  You will need to take an active role in your care.    Be sure to ask questions and learn all that you can about your surgery.  If you have any safety concerns, we urge you to tell a nurse as soon as possible.   This brochure is for information only.  It does not replace the advice of your doctor.  Always follow your doctor's advice.    Please tell us if you need a .    GETTING READY FOR SURGERY  Always follow your surgeon's instructions.  If you don't, your surgery could be canceled.  Please use the following checklist.    Within 30 Days of Surgery:    Have a pre-surgery physical exam with your family doctor or partner.    If you use a [facility], all of your information from the pre-op physical will be in the [facility] computer system.    Ask the doctor to send all of your results to the hospital before the surgery.  The doctor also may ask you to bring the results with you on the day of surgery or you can fax them to [phone].  Tell the doctor if:    You are allergic to latex or rubber  (Latex and rubber gloves are  often used in medical care).    You are taking any medicines (including aspirin), vitamins (Vitamin E, Fish Oil, Omegas) or herbal products.  You will need to stop taking some medicines before surgery.    You have any medical problems (allergies, diabetes or heart disease, for example).    You have a pacemaker or an AICD (automatic implanted cardiac defibrillator).  If you do, please bring the ID card with you on the day of surgery.    You are a smoker.  People who smoke have a higher risk of infection after surgery.  Ask your doctor how you can quit smoking.  Within 7 days of Surgery:  Prior to your surgical procedure, a nurse will be contacting you to obtain a health history. A nurse will call you within a few days of surgery to go over these and other instructions.  If you do not hear from them, please call them at [phone].        Call your insurance company.  Ask if you need pre-approval for your surgery.  If you do not have insurance, please let us know.      Arrange for someone to drive you home after surgery.  If you will have same-day surgery, you may not drive or take public transportation home by yourself.    Arrange for someone to stay with you for 24 hours after you go home.  This person must be a responsible adult (ie- Family member or friend).  The Day Before Surgery:     Call your surgeon if there are any changes in your health.  This includes signs of a cold or flu (such as a sore throat, runny nose, cough, rash or fever).    Do not smoke, drink alcohol or take over-the-counter medicine (unless your surgeon tells you to) for 24 hours before and after surgery.    If you take prescribed drugs:  You may need to stop them until after the surgery.  Follow your doctor's orders.  You may resume Aspirin and/or blood thinners after your surgery as directed by your physician/surgeon.    NO FOOD OR DRINK AFTER MIDNIGHT.  Follow your surgeon's orders for eating and drinking.  You need to have an empty  stomach before surgery.  This will make the surgery as safe as possible.  If you don't follow your doctor's orders, your surgery could be changed to another date.    The Day of Surgery:    Take a shower or bath the night before and the morning of surgery.  Use antiseptic soap or the soap your surgeon gave you.  Gently clean the skin.  Do not shave or scrub your surgery site.    Please remove deodorant, cologne, scented lotion, makeup, nail polish and jewelry (including rings and body piercings).  If you wear artificial nails, please remove at least one nail before coming to the hospital.    Wear clean, loose clothing to the hospital.    Bring these items to the hospital:  1. Your insurance card.  2. A list of all the medicines you take.  Include vitamins, minerals, herbs and over-the-counter drugs.  Note any drug allergies.  3. A copy of your advance health care directive, if you have one.  This tells us what treatment you would want -- and who would make health care decisions -- if you could no longer speak for yourself.  You may request this form in advance or download it from [email or web address].  4. A case for any glasses, contact lenses, hearing aids or dentures.  5. Your inhaler or CPAP machine, if you use these at home.  Leave extra cash, jewelry and other valuables at home.    When You Arrive:  When you get to the hospital, you will:    Check in.  If you are under age 18, you must be with a parent or legal guardian.    Electronically sign consent forms, if you haven't already.  These electronic forms state that you know the risks and benefits of surgery.  When you sign the forms, you give us permission to do the surgery.  Do not sign them unless you understand what will happen during and after your surgery.  If you have any questions about your surgery, ask to speak with your doctor before you sign the forms.  If you don't understand the answers, ask again.    Receive a copy of the Patients Bill of  "Rights.  If you do not receive a copy, please ask for one.    Change into hospital clothes.  Your belongings will be placed in a bag.  We will return them to you after surgery.    Meet with the anesthesia provider.  He or she will tell you what kind of anesthesia (medicine) will be used to keep you comfortable during surgery.  Remember: It's okay to remind doctors and nurses to wash their hands before touching you.   In most cases, your surgeon will use a marker to write his or her initials on the surgery site.  This ensures that the exact site is operated on.  For safety reasons, we will ask you the same questions many times.  For example, we may ask your name and birth date over and over again.  Friends and family can stay with you until it's time for surgery.  While you're in surgery, they will be in the waiting area.  Please note that cell phones are not allowed in some patient care areas.  If you have questions about what will happen in the operating room, talk to your care team.  After Surgery:    We will move you to a recovery room where we will watch you closely.  If you have any pain or discomfort, tell your nurse.  He or she will try to make you comfortable.      If you are staying overnight we will move you to your hospital room after you are awake.    If you are going home we will move you to another room.  Friends and family may be able to join you.  The length of time you spend in recovery depends on the type of medicine you received, your medical condition, and the type of surgery you had.  Dealing with pain:  A nurse will check your comfort level often during your stay.  He or she will work with you to manage your pain.  Remember:    All pain is real.  There are many ways to control pain.  We can help you decide what works best for you.    Ask for pain medicine when you need it.  Don't try to \"tough it out\" -- this can make you feel worse.  Always take your medicine as ordered.    Medicine doesn't " work the same for everyone.  If your medicine isn't working tell your nurse.  There may be other medicines or treatments we can try.  Going Home:  We will let you know when you're ready to leave the hospital.  Before you leave, we will tell you how to care for yourself at home and prevent infections.  If you do not understand something, please say so.  We will answer any questions you have.  We will then help you get ready to leave.  You must have an adult with you for the first 24 hours after you leave the hospital. Take it easy when you get home.  You will need some time to recover -- you may be more tired than you realize at first.  Rest and relax for at least the first 24 hours at home.  You'll feel better and heal faster if you take good care of yourself.                      Pre-Operative Surgery Scrub    Purpose:   The skin harbors a large variety of bacteria, both infectious and noninfectious.  Showering with an antiseptic soap prior to an invasive procedure will decrease the number of transient and resident (good and bad) bacteria that is normally found on the skin.    Procedure:      Shower with 1 bottle of antiseptic soap (enclosed) the evening before and 1 bottle the morning of surgery (bathing the day of the procedure is most important).       Apply the soap at full strength (use the entire bottle).  Gently clean the skin, rinse, and dry with a clean towel that is freshly laundered (out of the dryer) and then put on clean clothing that is freshly laundered.        We have given you information regarding pre-op showering.  We recommend that patients wash with an antiseptic soap prior to surgery.  This cleanser will be given to you at the clinic or mailed to your home.  It is advised that you liberally wash the specific area surgery area the night before, and again in the morning before the surgery.  Do not apply lotion afterward.  We would like to keep the skin as clean as possible.    Thank you for  following these important instructions.      You have been scheduled for surgery and we would like to give you some information that will assist in helping get the best possible outcome.      Before Surgery:   If for any reason you decide not to have the surgery, please contact our office.  We can easily cancel or reschedule the procedure. Please call the  at [phone] or after hours [phone]      Any pain related to the surgery that occurs before the surgery needs to be reported and managed by your primary care or referring doctor.      Please keep in mind that the time of surgery is subject to change.  Make sure you have nothing to eat or drink after midnight.  If your surgery is later in the afternoon, this recommendation might change, but not until the day before surgery after the actual time of the surgery has been established.    After Surgery:  When you are discharged from the recovery room, the nurses will review instructions with you and your caregiver.      Please wash your hands every time you touch the wound or change bandages or dressings.      Do not submerge the wound in water.  You may not use a bathtub or hot tub until the wound is closed.  The wait time frame is generally 2-3 weeks but any open area can be a source of incoming bacteria, so it is better to be on the safe side and avoid the tub until your wound is fully healed.      You may take a shower 24 hours after surgery.  Double check with your surgeon if it is ok for water to run over a wound, whether it has been sutured, stapled, glued or is open.  You may gently wash the wound using the antiseptic soap provided for your pre-surgery showering (do not use a washcloth).  Any mild soap will work as well.      Many surgical wounds will have small white strips of tape on them called Steri Strips.  Do not remove these.  The edges will curl and fall off within 7-10 days with normal showering.      If you are going home with sutures  (stitches) or staples, you must return to the clinic to have them taken out, usually within 1-2 weeks.      Signs and symptoms of infection include:  1. Fever, temperature over 101.5 ' F  2. Redness  3. Swelling  4. Increasing pain  5. Green or yellow drainage which may or may not have a foul odor.    Symptoms of infection need to be reported to your surgery office. Please call your Surgeon.      If you or your  are deaf or hard of hearing, or prefer a language other than English, please let us know.  We have many free services, including interpreters and other aids to help you communicate. You may ask for help  through any member of your care team or by calling Language Services at [phone], option 2.

## 2022-04-01 NOTE — TELEPHONE ENCOUNTER
Verbal order to admit to home care nursing and PT,OT.  Discharging home over the weekend    [facility]  [phone]

## 2022-04-01 NOTE — CARE PLAN
Prior to Admission Medication Reconciliation:     Medications added:   [] None  [x] As listed below:    PRN's: mintox, bacitracin, bisacodyl, tussin DM, artificial tears, Seroquel 25 mg, enema, A&D    Medications deleted:   [] None  [x] As listed below: debrox    Medications marked for review/removal by attending:  [x] None  [] As listed below:    Changes made to existing medications:   [] None  [x] Updated strengths and frequencies to most current  [] As listed below:      Last times/dates taken verified with patient:  [x] Yes- completed myself: spoke with nurse at [facility]  [] Prepared PTA medlist for review only. (will not be available to review personally)  [] Did not review with patient. Rx verification only. Review completed by nursing.    [] Nurse completed no changes made (double checked entries)  [] Unable to review with patient at this time:  [] Nurse completed/changes made:     Allergies listed at another location:  [x]Allergies match allergies listed in Epic  []Other allergies listed:    Allergy review:    []Did not review: reviewed by nursing  []Did not review: pt unable at this time  []Patient/MAR verified NKDA  [x]Patient/MAR verified current existing allergies: no changes made  []Patient confirmed current existing allergies and new allergies added:      Medication reconciliation sources:   []Patient  []Patient family member/emergency contact: **  [][facility] Report Review  []Epic Chart Review  []Care Everywhere review  []Pharmacy med list: **  []Pharmacy phone call  [x]Outside meds dispense report: see below  [x]Nursing home or Assisted Living MAR: [facility]   Name Strength Instructions DATE TAKEN TIME TAKEN Notes   [x] levothyroxine 50 mcg Daily  4/1/22 0611    [x] quetiapine 300 mg 3 tabs at bedtime  3/31/22     [x] simvastatin 40 mg 1 tab at bedtime  3/31/22     [x] A&D mariana   AAA PRN for dry reddened areas and rash      [x] apap 500 mg  2 tabs q6h prn       [x] Artificial tears   1 drop QID PRN  both      [x] Bacitracin   aaa prn cuts/abrasions      [x] Bisacodyl supp 10 mg  Once daily prn       [x] loperamide 2 mg  2 caps first loose stool. 1 cap after each subsequent loose stool. Max 16 mg/ 24 hr       [x] Milk of mag   30 ml once daily prn      [x] mintox susp   30 ml q4h prn       [x] quetiapine 25 mg  1 tab BID PRN agitation       [x] Enema Ready to use  Daily prn       [x] Tussin DM   2 tsp q4h prn       [x] Docusate  100 mg  2 caps BID  3/31/22     [x] Pantoprazole  40 mg Daily  3/31/22     [x] Vitamin d  1000 units  Daily  3/31/22       []Other: **    Pharmacy desired at discharge: [facility]    Is patient on coumadin?  [x]No    Requests for consultation by provider or pharmacist:   [] Patient understands why all of their meds were prescribed and how to take them. No questions.   [x] Managing party has no questions.   [] Patient/ managing party has questions about the following:  [] Did not review with patient. Cannot assess.     Fill dates and reported compliancy:  [x] Fill dates coincide with reported compliancy for all/most maintenance meds.   [] Fill dates do not coincide with compliancy with maintenance meds. See notes in PTA medlist and in comments.    [] Fill dates do not coincide with the following medications but pt reports compliancy:  [] Did not review with patient. Cannot assess.     Historian accuracy:  [] Excellent- alert and oriented, understands why meds were prescribed and how to take, able to answer specifics  [] Good- alert and oriented, understands why meds were prescribed and how to take, some confusion   [] Fair- alert and oriented, doesn't know medications without list, cannot answer specifics about medications, but has a decent process for which to take at home  [] Poor- does not know medications, may not have a process to take at home, may be cognitively unable to review at this time  [x]Medication management done by family member or facility, no concerns about historian  accuracy.   [] Did not review with patient. Cannot assess.     Medication Management:  [] Manages meds independently  [] Family member/ other party manages meds/assists:  [x] Meds managed by staff at facility  [] Meds set up by home care, family/other party helps administer  [] Meds set up by home care, self administers  [] Did not review with patient. Cannot assess.     Other medications aside from PTA:  [x] Denies taking any medications aside from those listed in PTA meds  [] Reports taking another medication(s) but cannot recall the name(s)  [] Refuses to say.  [] Did not review with patient. Cannot assess.     Comments: none    Masha Hatch on 4/1/2022 at 1:53 PM       Discrepancies: [x] No []Not Applicable []Yes: listed below    Issues completing PTA medication reconciliation:  [] On hold for a long time  [] Waited for a call back  [] Fax didn't come through  [] Fax took a long time  [] Other:    Notifying appropriate party of changes/additions/discrepancies:  [x]No pertinent changes made, notification not necessary.   [] Notified attending provider via text page/phone call  [] Notified attending provider in person  [] Notified pharmacy  [] Notified nurse  [] Medications have not been reconciled by a provider yet, notification not necessary  [] Pt is not admitted to floor yet, PTA meds completed before admission.     Medications Prior to Admission   Medication Sig Dispense Refill Last Dose     acetaminophen (TYLENOL) 500 MG tablet Take 1,000 mg by mouth every 6 hours as needed for mild pain or fever   Unknown at Unknown time     alum & mag hydroxide-simethicone (MAALOX) 200-200-20 MG/5ML SUSP suspension Take 30 mLs by mouth every 4 hours as needed   Unknown at Unknown time     bacitracin 500 UNIT/GM OINT Apply topically daily as needed (minor cuts and abrasions)   Unknown at Unknown time     bisacodyl (DULCOLAX) 10 MG suppository Place 10 mg rectally daily as needed for constipation   Unknown at Unknown time      Dextromethorphan-guaiFENesin  MG/5ML syrup Take 10 mLs by mouth every 4 hours as needed for cough   Unknown at Unknown time     hypromellose (ARTIFICIAL TEARS) 0.5 % SOLN ophthalmic solution Place 1 drop into both eyes 4 times daily as needed for dry eyes   Unknown at Unknown time     levothyroxine (SYNTHROID/LEVOTHROID) 50 MCG tablet Take 1 tablet (50 mcg) by mouth daily 28 tablet 4 4/1/2022 at 0611     loperamide (IMODIUM) 2 MG capsule Take 4 mg by mouth once as needed (first loose stool) . Take 1 capsule after each subsequent loose stool. Max 16 mg/ 24 hr   Unknown at Unknown time     magnesium hydroxide (MILK OF MAGNESIA) 400 MG/5ML suspension Take 30 mLs by mouth daily as needed for constipation or heartburn    Unknown at Unknown time     pantoprazole (PROTONIX) 40 MG EC tablet TAKE 1 TABLET (40 MG) BY MOUTH DAILY 28 tablet 0 Past Month at HELD     QUEtiapine (SEROQUEL) 25 MG tablet Take 25 mg by mouth 2 times daily as needed (agitation)        QUEtiapine (SEROQUEL) 300 MG tablet Take 900 mg by mouth At Bedtime   3/31/2022 at HS     simvastatin (ZOCOR) 40 MG tablet TAKE ONE TABLET BY MOUTH ATBEDTIME. 28 tablet 4 3/31/2022 at [facility]     Sodium Phosphates (ENEMA READY-TO-USE) 7-19 GM/118ML ENEM Place 1 enema rectally daily as needed   Unknown at Unknown time     VITAMIN D3 25 MCG (1000 UT) tablet TAKE 1 TABLET (25 MCG) BY MOUTH DAILY 28 tablet 4 Past Month at HELD     Vitamins A & D (VITAMIN A & D) external ointment Apply topically as needed (dry, reddened areas and/or rash)   Unknown at Unknown time     docusate sodium (COLACE) 100 MG capsule TAKE 2 CAPSULES (200 MG) BYMOUTH 2 TIMES DAILY 112 capsule 4              Medication Dispense History (from 4/1/2021 to 4/1/2022)  Expand All  Collapse All    Bisacodyl     Dispensed Days Supply Quantity Provider Pharmacy   BISACODYL 5MG EC TABLET 06/21/2021 1 4 Units MIKHAIL HALLMAN [facility]...   BISACODYL 5MG EC TABLET 06/02/2021 1 4 Units CANDACE WELCH  [facility]           Cholecalciferol     Dispensed Days Supply Quantity Provider Pharmacy   VITAMIN D3 25 MCG (1000IU) TAB 03/08/2022 28 28 Units [facility]   VITAMIN D3 25 MCG (1000IU) TAB 02/09/2022 28 28 [facility]   VITAMIN D3 25 MCG (1000IU) TAB 01/13/2022 28 28 Units [facility]   VITAMIN D3 25MCG (1000IU) TAB 01/12/2022 28 28 [facility]   VITAMIN D3 25MCG (1000IU) TAB 12/15/2021 28 28 Units [facility]   VITAMIN D3 25 MCG (1000IU) TAB 11/16/2021 28 28 Units [facility]   VITAMIN D3 25 MCG (1000IU) TAB 10/20/2021 28 28 Units [facility]   VITAMIN D3 25MCG (1000IU) TAB 09/22/2021 28 28 Units [facility]   VITAMIN D3 25MCG (1000IU) TAB 08/25/2021 28 28 [facility]   VITAMIN D3 25MCG (1000IU) TAB 07/28/2021 28 28 Units [facility]   VITAMIN D3 25MCG (1000IU) TAB 06/29/2021 28 28 [facility]   VITAMIN D3 25MCG (1000IU) TAB 06/02/2021 28 28 [facility]   VITAMIN D3 25MCG (1000IU) TAB 05/06/2021 28 28 [facility]   VITAMIN D3 25MCG (1000IU) TAB 04/08/2021 28 28 [facility]           Citalopram Hydrobromide     Dispensed Days Supply Quantity Provider Pharmacy   citalopram 10 mg tablet 07/02/2021 28 14 tablet [facility]...   CITALOPRAM 10MG TABLET 06/29/2021 28 14 Units [facility]...   CITALOPRAM 10MG TABLET 06/22/2021 19 10 Units [facility]...           Docusate Sodium     Dispensed Days Supply Quantity  Provider Pharmacy   DOCUSATE SODIUM 100MG SOFTGEL 03/08/2022 28 112 Units [facility]   DOCUSATE SODIUM 100MG SOFTGEL 02/09/2022 28 112 Units [facility]   DOCUSATE SODIUM 100MG SOFTGEL 01/12/2022 28 112 Units [facility]   DOCUSATE SODIUM 100MG SOFTGEL 12/15/2021 28 112 Units [facility]   DOCUSATE SODIUM 100MG SOFTGEL 11/16/2021 28 112 Units [facility]   DOCUSATE SODIUM 100MG SOFTGEL 10/20/2021 28 112 Units [facility]   DOK 100MG CAPSULE 09/22/2021 28 112 Units [facility]   DOK 100MG CAPSULE 08/25/2021 28 112 Units [facility]   DOK 100MG CAPSULE 07/28/2021 28 112 Units [facility]   DOK 100MG CAPSULE 06/29/2021 28 112 Units [facility]   DOK 100MG CAPSULE 06/02/2021 28 112 Units [facility]   DOK 100MG CAPSULE 05/06/2021 28 112 Units [facility]   DOK 100MG CAPSULE 04/09/2021 28 112 Units [facility]   DOK 100MG CAPSULE 04/08/2021 28 112 Units [facility]           Levothyroxine Sodium     Dispensed Days Supply Quantity Provider Pharmacy   LEVOTHYROXINE 50MCG TABLET 03/08/2022 28 28 Units [facility]   LEVOTHYROXINE 50MCG TABLET 02/09/2022 28 28 Units [facility]   LEVOTHYROXINE 50MCG TABLET 01/12/2022 28 28 Units [facility]   LEVOTHYROXINE 50MCG TABLET 12/15/2021 28 28 Units [facility]   LEVOTHYROXINE 50MCG TABLET 11/16/2021 28 28 Units [facility]   LEVOTHYROXINE 50MCG  TABLET 10/20/2021 28 28 Units [facility]   LEVOTHYROXINE 50MCG TABLET 09/22/2021 28 28 Units [facility]   LEVOTHYROXINE 50MCG TABLET 08/25/2021 28 28 Units [facility]   LEVOTHYROXINE 50MCG TABLET 07/28/2021 28 28 Units [facility]   LEVOTHYROXINE 50MCG TABLET 06/29/2021 28 28 Units [facility]   LEVOTHYROXINE 50MCG TABLET 06/02/2021 28 28 Units [facility]   LEVOTHYROXINE 50MCG TABLET 05/06/2021 28 28 Units [facility]   LEVOTHYROXINE 50MCG TABLET 04/08/2021 28 28 Units [facility]           PEG 3350-[id number]-NaCl-NaSulf     Dispensed Days Supply Quantity Provider Pharmacy   PEG-3350/ELECTROLYTES FOR SOLN 06/02/2021 2 4,000 mL [facility] WELCH Pharmacy...           Pantoprazole Sodium     Dispensed Days Supply Quantity Provider Pharmacy   PANTOPRAZOLE 40MG DR TABLET [facility]/08/2022 [facility]   PANTOPRAZOLE 40MG DR TABLET 02/09/2022 28 28 Units [facility]   PANTOPRAZOLE 40MG DR TABLET 01/12/2022 28 28 Units [facility]   PANTOPRAZOLE 40MG DR TABLET 12/15/2021 28 28 Units [facility]   PANTOPRAZOLE 40MG DR TABLET 11/16/2021 28 28 Units [facility]   PANTOPRAZOLE 40MG DR TABLET 10/20/2021 28 28 Units [facility]   PANTOPRAZOLE 40MG DR TABLET 09/22/2021 28 28 Units [facility]   PANTOPRAZOLE 40MG DR TABLET 08/25/2021 28 28 Units [facility]   PANTOPRAZOLE 40MG DR TABLET 07/28/2021 28 28 Units [facility]   PANTOPRAZOLE 40MG DR  TABLET 06/29/2021 28 28 Units [facility]   PANTOPRAZOLE 40MG DR TABLET 06/02/2021 28 28 Units [facility]   PANTOPRAZOLE 40MG DR TABLET 05/06/2021 28 [facility]   PANTOPRAZOLE 40MG DR TABLET 04/08/2021 28 [facility]           QUEtiapine Fumarate     Dispensed Days Supply Quantity Provider Pharmacy   QUETIAPINE 300MG TABLET 03/08/2022 28 84 Units [facility]...   QUETIAPINE 25MG TABLET 02/24/2022 14 28 Units [facility]...   QUETIAPINE 300MG TABLET 02/09/2022 28 84 Units [facility]...   QUETIAPINE 300MG TABLET 01/12/2022 28 84 Units [facility]...   QUETIAPINE 300MG TABLET 12/15/2021 28 84 Units [facility]   QUETIAPINE 300MG TABLET 11/23/2021 28 84 Units [facility]   QUETIAPINE 300MG TABLET 11/22/2021 28 84 Units [facility]   QUETIAPINE 300MG TABLET 10/20/2021 28 84 Units [facility]   QUETIAPINE 300MG TABLET 09/22/2021 28 84 Units [facility]   quetiapine 300 mg tablet 08/31/2021 28 84 tablet [facility]   QUETIAPINE 300MG TABLET 08/25/2021 28 84 Units [facility]...   QUETIAPINE 300MG TABLET 08/24/2021 13 39 Units [facility] HARR[facility]...   QUETIAPINE 300MG TABLET 07/28/2021 28 84 Units [facility]   QUETIAPINE 300MG TABLET 06/29/2021 28 84 Units [facility]   QUETIAPINE 300MG TABLET 06/02/2021 28 84 Units [facility] HARRISON [facility]...   QUETIAPINE 300MG TABLET 05/06/2021 28 84 Units [facility] HARRISON [facility]...   QUETIAPINE 300MG  TABLET 04/20/2021 30 90 Units [facility] HARRISON [facility]...   QUETIAPINE 300MG TABLET 04/08/2021 28 84 Units [facility]...           Simvastatin     Dispensed Days Supply Quantity Provider Pharmacy   SIMVASTATIN 40MG TABLET 03/08/2022 28 28 Units [facility]   SIMVASTATIN 40MG TABLET 02/09/2022 28 28 Units [facility]   SIMVASTATIN 40MG TABLET 01/12/2022 28 28 Units [facility]   SIMVASTATIN 40MG TABLET 12/15/2021 28 28 Units [facility]   SIMVASTATIN 40MG TABLET 11/16/2021 28 28 Units [facility]   SIMVASTATIN 40MG TABLET 10/20/2021 28 28 Units [facility]   SIMVASTATIN 40MG TABLET 09/22/2021 28 28 Units [facility]   SIMVASTATIN 40MG TABLET 08/25/2021 28 28 Units [facility]   SIMVASTATIN 40MG TABLET 07/28/2021 28 28 Units [facility]   SIMVASTATIN 40MG TABLET 06/29/2021 28 28 Units [facility]   SIMVASTATIN 40MG TABLET 06/02/2021 28 28 Units [facility]   SIMVASTATIN 40MG TABLET 05/06/2021 28 28 Units [facility]   SIMVASTATIN 40MG TABLET 04/08/2021 28 28 Units [facility]           Disclaimer    Certain dispenses may not be available or accurate in this report, including over-the-counter medications, low cost prescriptions, prescriptions paid for by the patient or non-participating sources, or errors in insurance claims information. The provider should independently verify medication history with the patient.    External Sources

## 2022-04-01 NOTE — OP NOTE
Operative note dated 4/1/2022.    Preoperative diagnosis: Severe primary osteoarthritis right knee.  Postoperative diagnosis: Severe primary osteoarthritis right knee  Procedure: Right total knee arthroplasty    Surgeon: Brody Dobson MD  Assistant: Perry Parrish PA-C    Anesthesia: Regional block and spinal anesthesia converted to a general anesthetic    Procedure:  Patient was brought to the operating room.  Spinal anesthetic was administered.  Patient was repositioned supine.  The right leg was prepped and draped in the usual sterile fashion.  The patient received IV antibiotics in tranexemic acid prior to the start of the procedure.  Once the patient was adequately prepped and draped a timeout was performed to confirm the proper patient surgical site and procedure.  The right leg was then elevated and exsanguinated.  The tourniquet was inflated to 300 mmHg.  An anterior incision was marked out on the knee.  The skin was incised subcutaneous tissue was dissected down to identify the medial retinaculum.  A medial parapatellar arthrotomy was performed.  The patella was mobilized laterally tilted measured and cut.  The patella sized to a 38 prosthesis.  Drill holes were made for a 38 mm prosthesis and a protective cover was placed on the cut surface of the patella.  The knee was then flexed and the patella was retracted laterally.  The remnant of the ACL PCL medial and lateral menisci were removed.  The intramedullary canal the femur was opened with a drill.  The intramedullary guide was placed.  The distal cutting block was positioned to take a 10 mm resection off the distal femur.  The distal femoral resection was performed.  The distal femur sized to a size 11 femoral prosthesis.  The cutting block was applied to the cut end of the distal femur and the anterior posterior and chamfer cuts were made.  Attention was then turned towards preparation on the tibial side.  Retractors were placed posteriorly medially and  laterally.  The external tibial alignment guide was used to position the tibial cutting block to take a perpendicular resection off the proximal tibia.  The tibial cut was made.  The tibia was sized to a size F tibial tray.  Trial components were inserted and the notch cut was made for a posterior stabilized knee.  The knee was brought out into full extension.  With the trial components in place the knee was well balanced and stable.  The knee had good range of motion.  We marked the tibial rotation and remove the trial components.  We then replaced the retractors and prepared the tibia for a size F tibial tray.  We then irrigated the knee thoroughly and attained hemostasis we injected local anesthesia liberally around the joint margin.  We then irrigated and dried the cut bony surfaces.  We cemented in the real components sequentially starting with the tibia followed by the femur than the patella.  With a 10 mm trial polyethylene in place we placed the knee through a range of motion and checked for stability.  The stability and range of motion were satisfactory.  We then inserted the real 10 mm thick polyethylene component for the tibia.  We brought the knee out into full extension we irrigated thoroughly.  We then released the tourniquet and attained hemostasis.  We then closed the medial parapatellar arthrotomy with interrupted #1 Vicryl suture.  We then irrigated and closed the subcutaneous layer with 2-0 Vicryl suture.  We then used staples to close the skin.  A sterile Aquacel dressing was applied.  Patient tolerated the procedure well did well postoperatively with no immediate postop complications this ends the dictation thanks.

## 2022-04-01 NOTE — ANESTHESIA CARE TRANSFER NOTE
Patient: Christiano Leon    Procedure: Procedure(s):  RIGHT TOTAL KNEE ARTHROPLASTY       Diagnosis: Primary osteoarthritis of right knee [M17.11]  Diagnosis Additional Information: No value filed.    Anesthesia Type:   Spinal     Note:    Oropharynx: oropharynx clear of all foreign objects  Level of Consciousness: awake  Oxygen Supplementation: nasal cannula  Level of Supplemental Oxygen (L/min / FiO2): 2  Independent Airway: airway patency satisfactory and stable  Dentition: dentition unchanged  Vital Signs Stable: post-procedure vital signs reviewed and stable  Report to RN Given: handoff report given  Patient transferred to: PACU    Handoff Report: Identifed the Patient, Identified the Reponsible Provider, Reviewed the pertinent medical history, Discussed the surgical course, Reviewed Intra-OP anesthesia mangement and issues during anesthesia, Set expectations for post-procedure period and Allowed opportunity for questions and acknowledgement of understanding      Vitals:  Vitals Value Taken Time   BP     Temp     Pulse     Resp     SpO2         Electronically Signed By: ZANE Ausitn CRNA  April 1, 2022  9:55 AM

## 2022-04-01 NOTE — ANESTHESIA POSTPROCEDURE EVALUATION
Patient: Christiano Leon    Procedure: Procedure(s):  RIGHT TOTAL KNEE ARTHROPLASTY       Anesthesia Type:  Spinal    Note:  Disposition: Admission   Postop Pain Control: Uneventful            Sign Out: Well controlled pain   PONV: No   Neuro/Psych: Uneventful            Sign Out: Acceptable/Baseline neuro status   Airway/Respiratory: Uneventful            Sign Out: Acceptable/Baseline resp. status   CV/Hemodynamics: Uneventful            Sign Out: Acceptable CV status; No obvious hypovolemia; No obvious fluid overload   Other NRE: NONE   DID A NON-ROUTINE EVENT OCCUR? No           Last vitals:  Vitals Value Taken Time   /89 04/01/22 1154   Temp 97.5  F (36.4  C) 04/01/22 1154   Pulse 66 04/01/22 1154   Resp 20 04/01/22 1154   SpO2 96 % 04/01/22 1154   Vitals shown include unvalidated device data.    Electronically Signed By: ZANE Garrison CRNA  April 1, 2022  12:12 PM

## 2022-04-01 NOTE — H&P
Admitted: 04/01/2022    PRIMARY CARE PHYSICIAN:  Genny Dang MD    CHIEF COMPLAINT:  Right total knee replacement.    HISTORY OF PRESENT ILLNESS:  Mr. Leon is a 77-year-old male with a history of degenerative joint disease and hypothyroidism, who presented today for a right total knee replacement.  The patient did undergo a right total knee replacement without any noted complications by Dr. Dobson today.  The patient was seen in the postoperative phase and is doing well.  He does report some pain, however, denies any chest pain, shortness of breath, and/or nausea or vomiting.    PAST MEDICAL HISTORY:  Remarkable for hypothyroidism, constipation, cataracts, hyperlipidemia.    PAST SURGICAL HISTORY:  Remarkable for cataract surgery, colonoscopy, hernia repair, right meniscus repair.    ALLERGIES:  NICKEL, PENICILLIN, SEASONAL ALLERGIES.    HOME MEDICATIONS:     1.  Tylenol 1000 mg every 6 hours as needed for pain.  2.  Maalox 2/200, 30 mL every 4 hours as needed.  3.  Bacitracin for minor cuts as needed.  4.  Guaifenesin 10 mL every 4 hours as needed for cough.  5.  Colace 100 mg twice daily.  6.  Synthroid 50 mcg p.o. daily.    7.  Imodium 4 grams as needed.  8.  Milk of magnesia as needed.  9.  Seroquel 25 mg p.o. b.i.d. for agitation and 300 mg at bedtime.  10.  Zocor 40 mg p.o. at bedtime.  11.  Vitamin D and vitamin A daily.    SOCIAL HISTORY:  The patient lives at a local assisted living.  He does have a distant history of smoking, however, quit in 1993.    FAMILY HISTORY:  Per record is noncontributory.    REVIEW OF SYSTEMS:  A 10-point review of systems was obtained.  Pertinent positives are included in HPI.  All other systems reviewed and found to be negative at this time.    PHYSICAL EXAMINATION:    VITAL SIGNS:  Blood pressure 168/96, respirations 18, O2 sat 96% on room air, heart rate 66, temperature 97.5.  GENERAL:  The patient is alert and oriented, pleasant, conversant, in no acute  distress.  CARDIOVASCULAR:  Heart is regular rate and rhythm.  S1, S2.  No murmurs, rubs, or gallops.  LUNGS:  Clear to auscultation bilaterally.  ABDOMEN:  Soft with positive bowel sounds.  EXTREMITIES:  Without any significant edema.  Right lower extremity knee is immobilized.  NEUROLOGIC:  No focal or lateralizing deficits.    ASSESSMENT AND PLAN:     1.  Mr. Johnson is a 77-year-old male status post right knee replacement.  2.  Orthopedic:  The patient is status post right knee replacement.  He will continue with PT and OT during this hospitalization.    3.  DVT prophylaxis:  Aspirin 325 mg p.o. daily.    4.  Cardiac:  The patient's cardiac status is stable.  5.  Respiratory:  The patient's respiratory status is stable.    6.  Endocrine:  The patient will continue on Synthroid as per outpatient.    7.  Fluid, electrolytes, and nutrition:  Electrolytes will be replaced if needed.  The patient will be placed on regular diet.    8.  Physical therapy and occupational therapy.    DISPOSITION:  Likely in the next 1-2 days.    CODE STATUS:  Full.    Rafael Chavarria DO        D: 2022   T: 2022   MT: PAKMT    Name:     ADIA JOHNSON  MRN:      [id number]        Account:     [id number]   :      1945           Admitted:    2022       Document: [id number]    cc:  ZANE Collins, CNP   R Dustin Akbar MD

## 2022-04-01 NOTE — ANESTHESIA PROCEDURE NOTES
Adductor canal Procedure Note    Pre-Procedure   Staff -        CRNA: Yara Welch APRN CRNA       Performed By: CRNA       Location: pre-op       Procedure Start/Stop Times: 4/1/2022 7:35 AM       Pre-Anesthestic Checklist: patient identified, IV checked, site marked, risks and benefits discussed, informed consent, monitors and equipment checked, pre-op evaluation, at physician/surgeon's request and post-op pain management  Timeout:       Correct Patient: Yes        Correct Procedure: Yes        Correct Site: Yes        Correct Position: Yes        Correct Laterality: Yes        Site Marked: Yes  Procedure Documentation  Procedure: Adductor canal       Laterality: right       Patient Position: sitting       Skin prep: Chloraprep       Needle Type: insulated       Needle Gauge: 20.        Needle Length (Inches): 4        Ultrasound guided       1. Ultrasound was used to identify targeted nerve, plexus, vascular marker, or fascial plane and place a needle adjacent to it in real-time.       2. Ultrasound was used to visualize the spread of anesthetic in close proximity to the above referenced structure.       3. A permanent image is entered into the patient's record.       4. The visualized anatomic structures appeared normal.       5. There were no apparent abnormal pathologic findings.    Assessment/Narrative         The placement was negative for: blood aspirated, painful injection and site bleeding       Paresthesias: No.       Insertion/Infusion Method: Single Shot       Complications: none       Injection made incrementally with aspirations every 5 mL.

## 2022-04-01 NOTE — ANESTHESIA PROCEDURE NOTES
Other (iPACK) Procedure Note    Pre-Procedure   Staff -        CRNA: Yara Welch APRN CRNA       Performed By: CRNA       Location: pre-op       Procedure Start/Stop Times: 4/1/2022 7:38 AM       Pre-Anesthestic Checklist: patient identified, IV checked, site marked, risks and benefits discussed, informed consent, monitors and equipment checked, pre-op evaluation, at physician/surgeon's request and post-op pain management  Timeout:       Correct Patient: Yes        Correct Procedure: Yes        Correct Site: Yes        Correct Position: Yes        Correct Laterality: Yes        Site Marked: Yes  Procedure Documentation  Procedure: Other (iPACK)       Laterality: right       Patient Position: supine       Skin prep: Chloraprep       Needle Type: insulated       Needle Gauge: 20.        Needle Length (Inches): 4        Ultrasound guided       1. Ultrasound was used to identify targeted nerve, plexus, vascular marker, or fascial plane and place a needle adjacent to it in real-time.       2. Ultrasound was used to visualize the spread of anesthetic in close proximity to the above referenced structure.       3. A permanent image is entered into the patient's record.       4. The visualized anatomic structures appeared normal.       5. There were no apparent abnormal pathologic findings.    Assessment/Narrative         The placement was negative for: blood aspirated, painful injection and site bleeding       Paresthesias: No.       Insertion/Infusion Method: Single Shot       Complications: none       Injection made incrementally with aspirations every 5 mL.

## 2022-04-01 NOTE — ANESTHESIA PROCEDURE NOTES
Other (Genicular) Procedure Note    Pre-Procedure   Staff -        CRNA: Yara Welch APRN CRNA       Performed By: CRNA       Procedure Start/Stop Times: 4/1/2022 7:41 AM       Pre-Anesthestic Checklist: patient identified, IV checked, site marked, risks and benefits discussed, informed consent, monitors and equipment checked, pre-op evaluation, at physician/surgeon's request and post-op pain management  Timeout:       Correct Patient: Yes        Correct Procedure: Yes        Correct Site: Yes        Correct Position: Yes        Correct Laterality: Yes        Site Marked: Yes  Procedure Documentation  Procedure: Other (Genicular)       Laterality: right       Patient Position: supine       Skin prep: Chloraprep       Needle Type: insulated       Needle Gauge: 20.        Needle Length (Inches): 4        Ultrasound guided       1. Ultrasound was used to identify targeted nerve, plexus, vascular marker, or fascial plane and place a needle adjacent to it in real-time.       2. Ultrasound was used to visualize the spread of anesthetic in close proximity to the above referenced structure.       3. A permanent image is entered into the patient's record.       4. The visualized anatomic structures appeared normal.       5. There were no apparent abnormal pathologic findings.    Assessment/Narrative         The placement was negative for: blood aspirated, painful injection and site bleeding       Paresthesias: No.     Bolus given via needle..        Secured via.        Insertion/Infusion Method: Single Shot       Complications: none       Injection made incrementally with aspirations every 5 mL.

## 2022-04-02 ENCOUNTER — APPOINTMENT (OUTPATIENT)
Dept: PHYSICAL THERAPY | Facility: HOSPITAL | Age: 77
End: 2022-04-02
Attending: SPECIALIST
Payer: MEDICARE

## 2022-04-02 VITALS
BODY MASS INDEX: 23.1 KG/M2 | OXYGEN SATURATION: 97 % | WEIGHT: 165 LBS | SYSTOLIC BLOOD PRESSURE: 163 MMHG | HEIGHT: 71 IN | TEMPERATURE: 97.6 F | DIASTOLIC BLOOD PRESSURE: 84 MMHG | RESPIRATION RATE: 18 BRPM | HEART RATE: 83 BPM

## 2022-04-02 PROBLEM — F03.91 DEMENTIA WITH BEHAVIORAL DISTURBANCE, UNSPECIFIED DEMENTIA TYPE: Status: ACTIVE | Noted: 2017-07-10

## 2022-04-02 PROBLEM — E03.9 ACQUIRED HYPOTHYROIDISM: Status: ACTIVE | Noted: 2017-07-10

## 2022-04-02 PROBLEM — Z96.651 S/P TOTAL KNEE ARTHROPLASTY, RIGHT: Status: ACTIVE | Noted: 2022-04-02

## 2022-04-02 LAB
ERYTHROCYTE [DISTWIDTH] IN BLOOD BY AUTOMATED COUNT: 13.8 % (ref 10–15)
FASTING STATUS PATIENT QL REPORTED: ABNORMAL
GLUCOSE BLD-MCNC: 143 MG/DL (ref 70–99)
HCT VFR BLD AUTO: 41.1 % (ref 40–53)
HGB BLD-MCNC: 13.3 G/DL (ref 13.3–17.7)
MCH RBC QN AUTO: 29.7 PG (ref 26.5–33)
MCHC RBC AUTO-ENTMCNC: 32.4 G/DL (ref 31.5–36.5)
MCV RBC AUTO: 92 FL (ref 78–100)
PLATELET # BLD AUTO: 185 10E3/UL (ref 150–450)
RBC # BLD AUTO: 4.48 10E6/UL (ref 4.4–5.9)
WBC # BLD AUTO: 17.6 10E3/UL (ref 4–11)

## 2022-04-02 PROCEDURE — 250N000011 HC RX IP 250 OP 636: Performed by: INTERNAL MEDICINE

## 2022-04-02 PROCEDURE — 99217 PR OBSERVATION CARE DISCHARGE: CPT | Mod: 24 | Performed by: NURSE PRACTITIONER

## 2022-04-02 PROCEDURE — 250N000013 HC RX MED GY IP 250 OP 250 PS 637: Performed by: PHYSICIAN ASSISTANT

## 2022-04-02 PROCEDURE — 36415 COLL VENOUS BLD VENIPUNCTURE: CPT | Performed by: SPECIALIST

## 2022-04-02 PROCEDURE — 250N000013 HC RX MED GY IP 250 OP 250 PS 637: Performed by: INTERNAL MEDICINE

## 2022-04-02 PROCEDURE — 85027 COMPLETE CBC AUTOMATED: CPT | Performed by: INTERNAL MEDICINE

## 2022-04-02 PROCEDURE — 82947 ASSAY GLUCOSE BLOOD QUANT: CPT | Performed by: SPECIALIST

## 2022-04-02 PROCEDURE — 97530 THERAPEUTIC ACTIVITIES: CPT | Mod: GP

## 2022-04-02 PROCEDURE — 250N000011 HC RX IP 250 OP 636: Performed by: PHYSICIAN ASSISTANT

## 2022-04-02 RX ORDER — LEVOTHYROXINE SODIUM 50 UG/1
50 TABLET ORAL DAILY
Status: DISCONTINUED | OUTPATIENT
Start: 2022-04-02 | End: 2022-04-02 | Stop reason: HOSPADM

## 2022-04-02 RX ORDER — ACETAMINOPHEN 325 MG/1
1000 TABLET ORAL EVERY 6 HOURS PRN
Status: DISCONTINUED | OUTPATIENT
Start: 2022-04-02 | End: 2022-04-02

## 2022-04-02 RX ORDER — SIMVASTATIN 40 MG
40 TABLET ORAL AT BEDTIME
Status: DISCONTINUED | OUTPATIENT
Start: 2022-04-02 | End: 2022-04-02 | Stop reason: HOSPADM

## 2022-04-02 RX ORDER — TRAMADOL HYDROCHLORIDE 50 MG/1
50 TABLET ORAL EVERY 6 HOURS PRN
Qty: 15 TABLET | Refills: 0 | Status: SHIPPED | OUTPATIENT
Start: 2022-04-02 | End: 2023-01-04

## 2022-04-02 RX ORDER — ONDANSETRON 4 MG/1
4 TABLET, ORALLY DISINTEGRATING ORAL EVERY 6 HOURS PRN
Status: DISCONTINUED | OUTPATIENT
Start: 2022-04-02 | End: 2022-04-02 | Stop reason: HOSPADM

## 2022-04-02 RX ORDER — ONDANSETRON 2 MG/ML
4 INJECTION INTRAMUSCULAR; INTRAVENOUS EVERY 6 HOURS PRN
Status: DISCONTINUED | OUTPATIENT
Start: 2022-04-02 | End: 2022-04-02 | Stop reason: HOSPADM

## 2022-04-02 RX ORDER — PANTOPRAZOLE SODIUM 40 MG/1
40 TABLET, DELAYED RELEASE ORAL
Status: DISCONTINUED | OUTPATIENT
Start: 2022-04-02 | End: 2022-04-02 | Stop reason: HOSPADM

## 2022-04-02 RX ORDER — ONDANSETRON 4 MG/1
4 TABLET, ORALLY DISINTEGRATING ORAL EVERY 8 HOURS PRN
Qty: 15 TABLET | Refills: 0 | Status: SHIPPED | OUTPATIENT
Start: 2022-04-02 | End: 2023-01-04

## 2022-04-02 RX ORDER — QUETIAPINE FUMARATE 300 MG/1
900 TABLET, FILM COATED ORAL AT BEDTIME
Status: DISCONTINUED | OUTPATIENT
Start: 2022-04-02 | End: 2022-04-02 | Stop reason: HOSPADM

## 2022-04-02 RX ORDER — DOCUSATE SODIUM 100 MG/1
100 CAPSULE, LIQUID FILLED ORAL 2 TIMES DAILY PRN
Status: DISCONTINUED | OUTPATIENT
Start: 2022-04-02 | End: 2022-04-02

## 2022-04-02 RX ADMIN — HYDRALAZINE HYDROCHLORIDE 10 MG: 20 INJECTION, SOLUTION INTRAMUSCULAR; INTRAVENOUS at 04:44

## 2022-04-02 RX ADMIN — CEFAZOLIN SODIUM 1 G: 1 INJECTION, SOLUTION INTRAVENOUS at 00:35

## 2022-04-02 RX ADMIN — LEVOTHYROXINE SODIUM 50 MCG: 0.05 TABLET ORAL at 08:24

## 2022-04-02 RX ADMIN — ASPIRIN 325 MG: 325 TABLET, COATED ORAL at 09:36

## 2022-04-02 RX ADMIN — PANTOPRAZOLE SODIUM 40 MG: 40 TABLET, DELAYED RELEASE ORAL at 06:42

## 2022-04-02 RX ADMIN — SENNOSIDES AND DOCUSATE SODIUM 1 TABLET: 50; 8.6 TABLET ORAL at 09:35

## 2022-04-02 RX ADMIN — ACETAMINOPHEN 325MG 975 MG: 325 TABLET ORAL at 03:45

## 2022-04-02 RX ADMIN — ACETAMINOPHEN 325MG 325 MG: 325 TABLET ORAL at 11:34

## 2022-04-02 NOTE — PROGRESS NOTES
04/02/22 1100   Signing Clinician's Name / Credentials   Signing clinician's name / credentials Kaitlynn Choudhary PT   Quick Adds   Rehab Discipline PT   Gait Training   Distance in Feet (Required for LE Total Joints) 110 x 2   Therapeutic Activity   Minutes of Treatment 25 minutes   Symptoms Noted During/After Treatment Increased pain   Treatment Detail Ther ex TKA: Supine heelslides x 10, Quad sets x 10, SLR x 5 x 2 with sl act asst to initiate movement. Seated SAQs x 10 x 2. Seated heelslides x 10 x 2 with act asst for encouraging knee flexion.  Sit to stand req SBA 1. Pt used bathroom, standing to urinate with SBA 1, washed hands etc standing at sink req SBA 1 with good balance noted. Pt reports no signif pain. Pt then ambul using FWW x 110 ft x 2 with seated rest. R knee pain incrd with ambulation and encouraged WBAT  using walker. Pt req cues for step through and knee flexion with toe off.. Pt did not need immobilizer d/t good quad strength.    PT Discharge Planning   PT Discharge Recommendation (DC Rec) home with home care physical therapy   PT Rationale for DC Rec Pt will benefit from further PT at assisted living facility for TKA Rehab for strengthening, ROM, gait training.    PT Brief overview of current status All transfers req SBA/CGA 1 with good strength/balance observed. Pt had R knee pain up to 6/10 during treatment session with knee flex and during gait. Did not want pain meds d/t signif nausea yesterday.  Ice placed back on R knee following treatment   Additional Documentation   Rehab Comments Issued FWW for home use. Paperwork signed by pt and Naomi Shukla NP   PT Plan DC home this date. No further inpatient PT planned.    Total Session Time   Total Session Time (minutes) 25 minutes

## 2022-04-02 NOTE — PLAN OF CARE
Patient discharged at 1:14 PM via wheel chair accompanied by staff, transported by son. Prescriptions sent to patients preferred pharmacy. All belongings sent with patient.     Discharge instructions reviewed with Patient and [employer] RN.     Patient discharged to [facility].   Report called to Lenka HARGROVE at [facility].     Surgical Patient   Surgical Procedures during stay: Right total knee arthroplasty  Did patient receive discharge instruction on wound care and recognition of infection symptoms? Yes    MISC  Follow up appointment made:  Yes  Home medications returned to patient: N/A  Patient reports pain was well managed at discharge: Yes

## 2022-04-02 NOTE — PLAN OF CARE
Goal Outcome Evaluation:       Patient vital signs are at baseline: No,  Reason:  /97, hydralazine needed.  Patient able to ambulate as they were prior to admission or with assist devices provided by therapies during their stay:  Yes  Patient MUST void prior to discharge:  No,  Reason:  Patient voiding without difficulty.  Patient able to tolerate oral intake:  Yes  Pain has adequate pain control using Oral analgesics:  Yes  Does patient have an identified :  No,   Has goal D/C date and time been discussed with patient:  Yes     Pt is A&O, makes needs known. BP elevated this shift, PRN hydralazine given x2. Tramadol and scheduled tylenol given for 4/10 pain to right knee. Remains on RA, O2 sats high 90's. LS with fine crackles to bases, encouraged deep breathing and coughing. HRR. BS active- tolerating regular diet. CMS intact, knee immobilizer in place, cyrocuff in place. ACE wrap is CDI. Adequate urine output this shift. IV SL. IV Ancef. Call light within reach. Up in chair. Walked the halls this evening.    Face to face report given with opportunity to observe patient.    Report given to DELVIN Moffett RN   4/1/2022  11:02 PM

## 2022-04-02 NOTE — PLAN OF CARE
"/85 (BP Location: Right arm, Patient Position: Chair)   Pulse 83   Temp 98.2  F (36.8  C) (Tympanic)   Resp 18   Ht 1.803 m (5' 11\")   Wt 74.8 kg (165 lb)   SpO2 96%   BMI 23.01 kg/m      Pt A&O, afebrile, VS and assessment as charted, standby assist, free of falls or injury this shift. BP has remained slightly elevated, 173/87 and 156/85, hydralazine given x1. Ace wrap CDI, cryocuff and immobilizer to leg. Pt sleeping reclined in chair per pt request. Rated pain 2/10 and later denied pain, has not wanted any pain medications all night when offered. IV SL, IV ancef given. Pt did vomit the coffee he drank this morning. Denied any nausea afterwards. Stated the coffee gave him an upset stomach. Declined any antiemetic, taking sips of after and was feeling better per pt.     Patient vital signs are at baseline: Yes  Patient able to ambulate as they were prior to admission or with assist devices provided by therapies during their stay:  Yes  Patient MUST void prior to discharge:  Has been voiding okay.  Patient able to tolerate oral intake:  Tolerated suppertime last night. Threw up coffee this AM, denied any nausea afterwards.    Pain has adequate pain control using Oral analgesics:  Yes  Does patient have an identified :  No  Has goal D/C date and time been discussed with patient:  Yes    Face to face report given with opportunity to observe patient.    Report given DELVIN Washington RN   4/2/2022  7:39 AM          "

## 2022-04-02 NOTE — DISCHARGE INSTRUCTIONS
You have a follow up appointment scheduled with [facility] Dr. Hauser on April 14th at 1111.  If you have questions about your appointments [phone].     Please refer to medication packet you received from Dr. Dobson's office on over the counter medications you can take after surgery to avoid constipation while on pain medications.

## 2022-04-02 NOTE — DISCHARGE SUMMARY
[facility]    Discharge Summary  Hospitalist    Date of Admission:  4/1/2022  Date of Discharge:  4/2/2022  1:14 AM  Discharging Provider: Naomi Mcdowell CNP  Date of Service (when I saw the patient): 4/2/22    Discharge Diagnoses   Principal Problem:    S/P total knee arthroplasty, right  Active Problems:    Acquired hypothyroidism    Dementia with behavioral disturbance, unspecified dementia type (H)    Chronic pain of right knee      History of Present Illness   Mr. Leon is a 77-year-old male with a history of degenerative joint disease and hypothyroidism, who presented today for a right total knee replacement.  The patient did undergo a right total knee replacement without any noted complications by Dr. Dobson today.  The patient was seen in the postoperative phase and is doing well.  He does report some pain, however, denies any chest pain, shortness of breath, and/or nausea or vomiting.    Hospital Course       S/P total knee arthroplasty, right: No immediate complications identified. Pain has been 3/10 at worse per patient. He did take 2 doeses of tramadol but has since refused anything for pain. He did have an episode of vomiing this morning without nausea, felt better afterwards. Refused any interventions. PT evaluated him and found him safe for discharge. Please see their notes.  I was able to speak with Dr. Allen who gave okay for discharge, requests 81mg ASA BID for DVT prophlaxis. He will have home health follow him and his follow-up appointment with [facility] has already been scheduled.       Naomi Mcdowell CNP        Pending Results     Unresulted Labs Ordered in the Past 30 Days of this Admission     Date and Time Order Name Status Description    4/1/2022  9:37 AM Surgical Pathology Exam In process           Code Status   Full Code       Primary Care Physician   Genny Dang    Discharge Disposition   Discharged to assisted living  Condition at discharge: Stable    Consultations  This Hospital Stay   PHYSICAL THERAPY ADULT IP CONSULT  OCCUPATIONAL THERAPY ADULT IP CONSULT  HOSPITALIST IP CONSULT  PHYSICAL THERAPY ADULT IP CONSULT  OCCUPATIONAL THERAPY ADULT IP CONSULT    Time Spent on this Encounter   Naomi MARTELL NP, personally saw the patient today and spent greater than 30 minutes discharging this patient.    Discharge Orders      Home Care Referral      Reason for your hospital stay    Total knee replacement     Follow-up and recommended labs and tests     Follow-up as scheduled.     Activity    Your activity upon discharge: activity as tolerated and weight bear as tolerated     Crutches DME    DME Documentation: Describe the reason for need to support medical necessity: Impaired gait status post knee surgery. I, the undersigned, certify that the above prescribed supplies are medically necessary for this patient and is both reasonable and necessary in reference to accepted standards of medical practice in the treatment of this patient's condition and is not prescribed as a convenience.     Cane DME    DME Documentation: Describe the reason for need to support medical necessity: Impaired gait status post knee surgery. I, the undersigned, certify that the above prescribed supplies are medically necessary for this patient and is both reasonable and necessary in reference to accepted standards of medical practice in the treatment of this patient's condition and is not prescribed as a convenience.     Walker DME    DME Documentation: Describe the reason for need to support medical necessity: Impaired gait status post knee surgery. I, the undersigned, certify that the above prescribed supplies are medically necessary for this patient and is both reasonable and necessary in reference to accepted standards of medical practice in the treatment of this patient's condition and is not prescribed as a convenience.     Diet    Follow this diet upon discharge: Orders Placed This Encounter       Regular Diet Adult     Discharge Medications   Current Discharge Medication List      START taking these medications    Details   aspirin (ASA) 81 MG EC tablet Take 1 tablet (81 mg) by mouth 2 times daily  Qty: 30 tablet, Refills: 0    Associated Diagnoses: Chronic pain of right knee      ondansetron (ZOFRAN-ODT) 4 MG ODT tab Take 1 tablet (4 mg) by mouth every 8 hours as needed for nausea or vomiting  Qty: 15 tablet, Refills: 0    Associated Diagnoses: Chronic pain of right knee      traMADol (ULTRAM) 50 MG tablet Take 1 tablet (50 mg) by mouth every 6 hours as needed for moderate pain or severe pain  Qty: 15 tablet, Refills: 0    Associated Diagnoses: Chronic pain of right knee         CONTINUE these medications which have NOT CHANGED    Details   acetaminophen (TYLENOL) 500 MG tablet Take 1,000 mg by mouth every 6 hours as needed for mild pain or fever      alum & mag hydroxide-simethicone (MAALOX) 200-200-20 MG/5ML SUSP suspension Take 30 mLs by mouth every 4 hours as needed      bacitracin 500 UNIT/GM OINT Apply topically daily as needed (minor cuts and abrasions)      bisacodyl (DULCOLAX) 10 MG suppository Place 10 mg rectally daily as needed for constipation      Dextromethorphan-guaiFENesin  MG/5ML syrup Take 10 mLs by mouth every 4 hours as needed for cough      docusate sodium (COLACE) 100 MG capsule TAKE 2 CAPSULES (200 MG) BYMOUTH 2 TIMES DAILY  Qty: 112 capsule, Refills: 4    Associated Diagnoses: Constipation, unspecified constipation type      hypromellose (ARTIFICIAL TEARS) 0.5 % SOLN ophthalmic solution Place 1 drop into both eyes 4 times daily as needed for dry eyes      levothyroxine (SYNTHROID/LEVOTHROID) 50 MCG tablet Take 1 tablet (50 mcg) by mouth daily  Qty: 28 tablet, Refills: 4    Associated Diagnoses: Hypothyroidism, unspecified type      loperamide (IMODIUM) 2 MG capsule Take 4 mg by mouth once as needed (first loose stool) . Take 1 capsule after each subsequent loose stool. Max 16  mg/ 24 hr      magnesium hydroxide (MILK OF MAGNESIA) 400 MG/5ML suspension Take 30 mLs by mouth daily as needed for constipation or heartburn       pantoprazole (PROTONIX) 40 MG EC tablet TAKE 1 TABLET (40 MG) BY MOUTH DAILY  Qty: 28 tablet, Refills: 0    Associated Diagnoses: Gastroesophageal reflux disease with esophagitis without hemorrhage      !! QUEtiapine (SEROQUEL) 25 MG tablet Take 25 mg by mouth 2 times daily as needed (agitation)      !! QUEtiapine (SEROQUEL) 300 MG tablet Take 900 mg by mouth At Bedtime      simvastatin (ZOCOR) 40 MG tablet TAKE ONE TABLET BY MOUTH ATBEDTIME.  Qty: 28 tablet, Refills: 4    Associated Diagnoses: Hyperlipidemia LDL goal <100      Sodium Phosphates (ENEMA READY-TO-USE) 7-19 GM/118ML ENEM Place 1 enema rectally daily as needed      VITAMIN D3 25 MCG (1000 UT) tablet TAKE 1 TABLET (25 MCG) BY MOUTH DAILY  Qty: 28 tablet, Refills: 4    Associated Diagnoses: Vitamin D deficiency      Vitamins A & D (VITAMIN A & D) external ointment Apply topically as needed (dry, reddened areas and/or rash)       !! - Potential duplicate medications found. Please discuss with provider.        Allergies   Allergies   Allergen Reactions     Nickel      Possible. Cheap metal.     Penicillins      Seasonal Allergies      Perfumes     Data   Most Recent 3 CBC's:Recent Labs   Lab Test 04/02/22  0507 03/18/22  1348 04/29/21  1537   WBC 17.6* 4.9 5.5   HGB 13.3 13.6 13.4   MCV 92 90 92    171 190      Most Recent 3 BMP's:  Recent Labs   Lab Test 04/02/22  0507 03/18/22  1348 04/29/21  1537 12/21/20  1515   NA  --  141 143 142   POTASSIUM  --  4.2 4.1 3.8   CHLORIDE  --  110* 110* 109   CO2  --  27 29 26   BUN  --  14 19 11   CR  --  1.09 1.00 0.89   ANIONGAP  --  4 4 7   GAYLE  --  8.6 8.8 8.2*   * 103* 99 91     Most Recent 2 LFT's:  Recent Labs   Lab Test 03/18/22  1348 04/29/21  1537   AST 19 12   ALT 20 20   ALKPHOS 82 80   BILITOTAL 0.5 0.3     Most Recent INR's and Anticoagulation  Dosing History:  Anticoagulation Dose History    There is no flowsheet data to display.       Most Recent 3 Troponin's:No lab results found.  Most Recent Cholesterol Panel:  Recent Labs   Lab Test 04/29/21  1537   CHOL 140   LDL 73   HDL 53   TRIG 72     Most Recent 6 Bacteria Isolates From Any Culture (See EPIC Reports for Culture Details):No lab results found.  Most Recent TSH, T4 and A1c Labs:  Recent Labs   Lab Test 03/18/22  1348 04/09/19  1305 09/11/18  0936   TSH 0.99   < >  --    A1C  --   --  5.8*    < > = values in this interval not displayed.     Results for orders placed or performed during the hospital encounter of 04/01/22   XR Knee Port Right 1/2 Views    Narrative    PROCEDURE: XR KNEE PORT RIGHT 1/2 VIEWS 4/1/2022 10:10 AM    HISTORY: Post-Op Total Knee    COMPARISONS: 12/9/2021.    TECHNIQUE: 2 views.    FINDINGS: There is a right knee arthroplasty with components in  satisfactory position. Skin staples are seen anteriorly.         Impression    IMPRESSION: Interval right knee arthroplasty.    NOHEMY SPRING MD         SYSTEM ID:  [id number]

## 2022-04-05 ENCOUNTER — TELEPHONE (OUTPATIENT)
Dept: FAMILY MEDICINE | Facility: OTHER | Age: 77
End: 2022-04-05
Payer: MEDICARE

## 2022-04-05 LAB
PATH REPORT.COMMENTS IMP SPEC: NORMAL
PATH REPORT.COMMENTS IMP SPEC: NORMAL
PATH REPORT.FINAL DX SPEC: NORMAL
PATH REPORT.GROSS SPEC: NORMAL
PATH REPORT.MICROSCOPIC SPEC OTHER STN: NORMAL
PATH REPORT.RELEVANT HX SPEC: NORMAL
PHOTO IMAGE: NORMAL

## 2022-04-05 PROCEDURE — 88300 SURGICAL PATH GROSS: CPT | Mod: 26 | Performed by: PATHOLOGY

## 2022-04-05 NOTE — TELEPHONE ENCOUNTER
12:28 PM    Reason for Call: Verbal Orders     Description: Call from FAIZA Polk with [facility] requesting VO for OT 1 x per week x 5 weeks.    LUÍS De Guzman requesting verbal orders for PT 2 x per week x 2 weeks, then 1 x per week x 5 weeks for strengthening, ROM, gait training, mobility training.       Was an appointment offered for this call? No  If yes : Appointment type              Date    Preferred method for responding to this message: Telephone Call  What is your phone number -  May return call to Jam at [phone]    If we cannot reach you directly, may we leave a detailed response at the number you provided? Yes    Can this message wait until your PCP/provider returns, if available today? NATALIA Nance RN

## 2022-04-07 DIAGNOSIS — K21.00 GASTROESOPHAGEAL REFLUX DISEASE WITH ESOPHAGITIS WITHOUT HEMORRHAGE: ICD-10-CM

## 2022-04-08 DIAGNOSIS — E55.9 VITAMIN D DEFICIENCY: ICD-10-CM

## 2022-04-08 DIAGNOSIS — K59.00 CONSTIPATION, UNSPECIFIED CONSTIPATION TYPE: ICD-10-CM

## 2022-04-08 RX ORDER — DOCUSATE SODIUM 100 MG/1
CAPSULE, LIQUID FILLED ORAL
Qty: 112 CAPSULE | Refills: 4 | Status: SHIPPED | OUTPATIENT
Start: 2022-04-08 | End: 2022-07-28

## 2022-04-08 RX ORDER — PANTOPRAZOLE SODIUM 40 MG/1
40 TABLET, DELAYED RELEASE ORAL DAILY
Qty: 28 TABLET | Refills: 11 | Status: SHIPPED | OUTPATIENT
Start: 2022-04-08 | End: 2023-02-13

## 2022-04-08 RX ORDER — VITAMIN B COMPLEX
TABLET ORAL
Qty: 28 TABLET | Refills: 4 | Status: SHIPPED | OUTPATIENT
Start: 2022-04-08 | End: 2022-07-28

## 2022-04-08 NOTE — TELEPHONE ENCOUNTER
Docusate      Last Written Prescription Date:  11/23/21  Last Fill Quantity: 112,   # refills: 4  Last Office Visit: 3/28/22  Future Office visit:       Routing refill request to provider for review/approval because:      Vit D      Last Written Prescription Date:  11/23/21  Last Fill Quantity: 28,   # refills: 4  Last Office Visit: 3/28/22  Future Office visit:       Routing refill request to provider for review/approval because:

## 2022-04-11 ENCOUNTER — TELEPHONE (OUTPATIENT)
Dept: FAMILY MEDICINE | Facility: OTHER | Age: 77
End: 2022-04-11
Payer: MEDICARE

## 2022-04-11 NOTE — TELEPHONE ENCOUNTER
[facility]  [phone]      Verbal order for skilled nursing 1 time a week and pain management.

## 2022-04-12 DIAGNOSIS — G89.29 CHRONIC PAIN OF RIGHT KNEE: ICD-10-CM

## 2022-04-12 DIAGNOSIS — M25.561 CHRONIC PAIN OF RIGHT KNEE: ICD-10-CM

## 2022-04-12 RX ORDER — TRAMADOL HYDROCHLORIDE 50 MG/1
50 TABLET ORAL EVERY 6 HOURS PRN
Qty: 15 TABLET | Refills: 0 | OUTPATIENT
Start: 2022-04-12

## 2022-04-12 NOTE — TELEPHONE ENCOUNTER
Ultram      Last Written Prescription Date:  4.3.22  Last Fill Quantity: #15,   # refills: 0  Last Office Visit: 3.18.22  Future Office visit:       Routing refill request to provider for review/approval because:  Drug not on the G, P or [facility] refill protocol or controlled substance

## 2022-04-14 ENCOUNTER — TRANSFERRED RECORDS (OUTPATIENT)
Dept: HEALTH INFORMATION MANAGEMENT | Facility: CLINIC | Age: 77
End: 2022-04-14

## 2022-04-15 DIAGNOSIS — Z53.9 PERSONS ENCOUNTERING HEALTH SERVICES FOR SPECIFIC PROCEDURES, NOT CARRIED OUT: Primary | ICD-10-CM

## 2022-04-15 PROCEDURE — G0180 MD CERTIFICATION HHA PATIENT: HCPCS | Performed by: NURSE PRACTITIONER

## 2022-05-05 DIAGNOSIS — M25.561 CHRONIC PAIN OF RIGHT KNEE: ICD-10-CM

## 2022-05-05 DIAGNOSIS — G89.29 CHRONIC PAIN OF RIGHT KNEE: ICD-10-CM

## 2022-05-06 RX ORDER — TRAMADOL HYDROCHLORIDE 50 MG/1
TABLET ORAL
Qty: 15 TABLET | Refills: 0 | OUTPATIENT
Start: 2022-05-06

## 2022-05-06 NOTE — TELEPHONE ENCOUNTER
Ultram      Last Written Prescription Date:  4.3.22  Last Fill Quantity: #15,   # refills: 0  Last Office Visit: 3.18.22  Future Office visit:       Routing refill request to provider for review/approval because:  Drug not on the G, P or [facility] refill protocol or controlled substance

## 2022-06-23 DIAGNOSIS — Z96.659 STATUS POST TOTAL KNEE REPLACEMENT, UNSPECIFIED LATERALITY: Primary | ICD-10-CM

## 2022-06-27 ENCOUNTER — MEDICAL CORRESPONDENCE (OUTPATIENT)
Dept: HEALTH INFORMATION MANAGEMENT | Facility: CLINIC | Age: 77
End: 2022-06-27

## 2022-06-28 DIAGNOSIS — E78.5 HYPERLIPIDEMIA LDL GOAL <100: ICD-10-CM

## 2022-06-28 DIAGNOSIS — E03.9 HYPOTHYROIDISM, UNSPECIFIED TYPE: ICD-10-CM

## 2022-06-30 RX ORDER — LEVOTHYROXINE SODIUM 50 UG/1
50 TABLET ORAL DAILY
Qty: 28 TABLET | Refills: 4 | Status: SHIPPED | OUTPATIENT
Start: 2022-06-30 | End: 2022-10-20

## 2022-06-30 RX ORDER — SIMVASTATIN 40 MG
TABLET ORAL
Qty: 28 TABLET | Refills: 4 | Status: SHIPPED | OUTPATIENT
Start: 2022-06-30 | End: 2022-10-20

## 2022-06-30 NOTE — TELEPHONE ENCOUNTER
zocor       Last Written Prescription Date:  3-9-22  Last Fill Quantity: 28,   # refills: 4  Last Office Visit: 3-18-22  Future Office visit:    Next 5 appointments (look out 90 days)    Sep 19, 2022 10:00 AM  (Arrive by 9:45 AM)  SHORT with ZANE Navarrete CNP  [facility] ([facility] [place] ) [address]  [place] [address]  [phone]           Levothyroxine       Last Written Prescription Date:  3-9-22  Last Fill Quantity: 28,   # refills: 4

## 2022-07-28 DIAGNOSIS — E55.9 VITAMIN D DEFICIENCY: ICD-10-CM

## 2022-07-28 DIAGNOSIS — K59.00 CONSTIPATION, UNSPECIFIED CONSTIPATION TYPE: ICD-10-CM

## 2022-07-28 RX ORDER — DOCUSATE SODIUM 100 MG/1
CAPSULE, LIQUID FILLED ORAL
Qty: 112 CAPSULE | Refills: 4 | Status: SHIPPED | OUTPATIENT
Start: 2022-07-28 | End: 2022-11-18

## 2022-07-28 RX ORDER — VITAMIN B COMPLEX
TABLET ORAL
Qty: 28 TABLET | Refills: 4 | Status: SHIPPED | OUTPATIENT
Start: 2022-07-28 | End: 2022-11-18

## 2022-07-29 ENCOUNTER — TELEPHONE (OUTPATIENT)
Dept: FAMILY MEDICINE | Facility: OTHER | Age: 77
End: 2022-07-29

## 2022-07-29 NOTE — TELEPHONE ENCOUNTER
This writer called [facility] and spoke with Imtiaz and notified him per PCP, patient should be seen by orthopedist. Imtiaz verbalized understanding.

## 2022-07-29 NOTE — TELEPHONE ENCOUNTER
Genny Dang APRN CNP  You 1 minute ago (3:53 PM)     NM    He should call his orthopedist and get back in there.     Genny DEGROOT FNP-BC   Family Nurse Practitioner    Message text
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
## 2022-08-08 NOTE — PROGRESS NOTES
Assessment & Plan     Right knee pain, unspecified chronicity  Reviewed XR no new injury noted - discussed self care with RICE - follow up if pain continues or any concerns   - XR Knee Right 3 Views (Clinic Performed); Future       See Patient Instructions    No follow-ups on file.    ZANE Collins CNP  [facility] - [place] Alvarado is a 77 year old, presenting for the following health issues:  Live at [facility]  Knee Pain (Right knee)      HPI     Pain History:  When did you first notice your pain? - Chronic Pain   Have you seen this provider for your pain in the past?   Yes   Where in your body do you have pain? Right knee  Are you seeing anyone else for your pain? Yes - Orthopedics.  Right TKA 4/1/21.    PHQ-9 SCORE 11/8/2018 4/29/2021 12/9/2021   PHQ-9 Total Score 0 0 0       MIGUEL-7 SCORE 11/8/2018 4/29/2021 12/9/2021   Total Score 0 0 0               Chronic Pain Follow Up:    Location of pain: lateral right knee pain  Analgesia/pain control:    - Recent changes:   Hit by automatic door     - Overall control: Tolerable with discomfort    - Current treatments: aspirin at times      PDMP Review       Value Time User    State PDMP site checked  Yes 3/18/2022 11:59 AM Genny Dang APRN CNP        Last CSA Agreement:   CSA -- Patient Level:    CSA: None found at the patient level.       Last UDS:         Review of Systems   CONSTITUTIONAL: NEGATIVE for fever, chills, change in weight  INTEGUMENTARY/SKIN: NEGATIVE for worrisome rashes, moles or lesions  RESP: NEGATIVE for significant cough or SOB  CV: NEGATIVE for chest pain, palpitations or peripheral edema  MUSCULOSKELETAL: lateral right knee pain  NEURO: denies any weakness into the right knee      Objective    /70   Pulse 84   Temp 97.3  F (36.3  C) (Tympanic)   Wt 74.4 kg (164 lb)   SpO2 96%   BMI 22.87 kg/m    Body mass index is 22.87 kg/m .  Physical Exam   GENERAL: alert  RESP: regular  non-labored   CV: regular rates, peripheral pulses strong and no peripheral edema  MS: tenderness to palpation lateral right knee   SKIN: no suspicious lesions or rashes    Results for orders placed or performed in visit on 08/09/22   XR Knee Right 3 Views (Clinic Performed)     Status: None    Narrative    PROCEDURE:  XR KNEE RIGHT 3 VIEWS    HISTORY: Right knee pain, unspecified chronicity    COMPARISON:  None.    TECHNIQUE:  3 views of the right knee were obtained.    FINDINGS:  There is a right knee prosthesis. Prosthetic elements  appear well seated. The distal femur patella proximal tibia and fibula  are intact.       Impression    IMPRESSION: Knee prosthesis in place. No acute abnormality.      SHANNA KATE MD         SYSTEM ID:  [id number]                   .  ..

## 2022-08-09 ENCOUNTER — ANCILLARY PROCEDURE (OUTPATIENT)
Dept: GENERAL RADIOLOGY | Facility: OTHER | Age: 77
End: 2022-08-09
Attending: NURSE PRACTITIONER
Payer: MEDICARE

## 2022-08-09 ENCOUNTER — OFFICE VISIT (OUTPATIENT)
Dept: FAMILY MEDICINE | Facility: OTHER | Age: 77
End: 2022-08-09
Attending: NURSE PRACTITIONER
Payer: MEDICARE

## 2022-08-09 VITALS
HEART RATE: 84 BPM | TEMPERATURE: 97.3 F | WEIGHT: 164 LBS | DIASTOLIC BLOOD PRESSURE: 70 MMHG | SYSTOLIC BLOOD PRESSURE: 120 MMHG | OXYGEN SATURATION: 96 % | BODY MASS INDEX: 22.87 KG/M2

## 2022-08-09 DIAGNOSIS — M25.561 RIGHT KNEE PAIN, UNSPECIFIED CHRONICITY: Primary | ICD-10-CM

## 2022-08-09 DIAGNOSIS — M25.561 RIGHT KNEE PAIN, UNSPECIFIED CHRONICITY: ICD-10-CM

## 2022-08-09 PROCEDURE — G0463 HOSPITAL OUTPT CLINIC VISIT: HCPCS | Mod: 25

## 2022-08-09 PROCEDURE — G0463 HOSPITAL OUTPT CLINIC VISIT: HCPCS

## 2022-08-09 PROCEDURE — 99213 OFFICE O/P EST LOW 20 MIN: CPT | Performed by: NURSE PRACTITIONER

## 2022-08-09 PROCEDURE — 73562 X-RAY EXAM OF KNEE 3: CPT | Mod: TC,RT

## 2022-08-09 ASSESSMENT — PAIN SCALES - GENERAL: PAINLEVEL: NO PAIN (0)

## 2022-08-09 NOTE — PATIENT INSTRUCTIONS
Rest, ice, Compression/ace wrap for support if needed, and elevation     Reviewed XR no new injury

## 2022-08-09 NOTE — NURSING NOTE
"Chief Complaint   Patient presents with     Knee Pain     Right knee       Initial /70   Pulse 84   Temp 97.3  F (36.3  C) (Tympanic)   Wt 74.4 kg (164 lb)   SpO2 96%   BMI 22.87 kg/m   Estimated body mass index is 22.87 kg/m  as calculated from the following:    Height as of 4/1/22: 1.803 m (5' 11\").    Weight as of this encounter: 74.4 kg (164 lb).  Medication Reconciliation: complete  Lauren Moreno LPN  "

## 2022-08-26 DIAGNOSIS — Z96.659 STATUS POST TOTAL KNEE REPLACEMENT, UNSPECIFIED LATERALITY: ICD-10-CM

## 2022-08-29 RX ORDER — ASPIRIN 81 MG/1
TABLET, COATED ORAL
Qty: 30 TABLET | Refills: 11 | Status: SHIPPED | OUTPATIENT
Start: 2022-08-29 | End: 2024-01-17

## 2022-08-29 NOTE — TELEPHONE ENCOUNTER
Asa       Last Written Prescription Date:  6-26-22  Last Fill Quantity: 30,   # refills: 3  Last Office Visit: 8-9-22  Future Office visit:    Next 5 appointments (look out 90 days)    Sep 19, 2022 10:00 AM  (Arrive by 9:45 AM)  SHORT with ZANE Navarrete CNP  [facility] [place] ([facility] [place] ) [address]  [place] [address]  [phone]

## 2022-09-19 ENCOUNTER — OFFICE VISIT (OUTPATIENT)
Dept: FAMILY MEDICINE | Facility: OTHER | Age: 77
End: 2022-09-19
Attending: NURSE PRACTITIONER
Payer: MEDICARE

## 2022-09-19 VITALS
DIASTOLIC BLOOD PRESSURE: 70 MMHG | OXYGEN SATURATION: 95 % | BODY MASS INDEX: 22.32 KG/M2 | TEMPERATURE: 97 F | SYSTOLIC BLOOD PRESSURE: 114 MMHG | HEART RATE: 74 BPM | WEIGHT: 160 LBS

## 2022-09-19 DIAGNOSIS — Z23 NEED FOR PROPHYLACTIC VACCINATION AND INOCULATION AGAINST INFLUENZA: ICD-10-CM

## 2022-09-19 DIAGNOSIS — E78.5 HYPERLIPIDEMIA LDL GOAL <100: Primary | ICD-10-CM

## 2022-09-19 DIAGNOSIS — E03.9 ACQUIRED HYPOTHYROIDISM: ICD-10-CM

## 2022-09-19 LAB
ALBUMIN SERPL-MCNC: 3.5 G/DL (ref 3.4–5)
ALP SERPL-CCNC: 100 U/L (ref 40–150)
ALT SERPL W P-5'-P-CCNC: 12 U/L (ref 0–70)
ANION GAP SERPL CALCULATED.3IONS-SCNC: 1 MMOL/L (ref 3–14)
AST SERPL W P-5'-P-CCNC: 11 U/L (ref 0–45)
BILIRUB SERPL-MCNC: 0.3 MG/DL (ref 0.2–1.3)
BUN SERPL-MCNC: 20 MG/DL (ref 7–30)
CALCIUM SERPL-MCNC: 8.6 MG/DL (ref 8.5–10.1)
CHLORIDE BLD-SCNC: 108 MMOL/L (ref 94–109)
CHOLEST SERPL-MCNC: 141 MG/DL
CO2 SERPL-SCNC: 29 MMOL/L (ref 20–32)
CREAT SERPL-MCNC: 1.21 MG/DL (ref 0.66–1.25)
FASTING STATUS PATIENT QL REPORTED: YES
GFR SERPL CREATININE-BSD FRML MDRD: 61 ML/MIN/1.73M2
GLUCOSE BLD-MCNC: 124 MG/DL (ref 70–99)
HDLC SERPL-MCNC: 44 MG/DL
LDLC SERPL CALC-MCNC: 71 MG/DL
NONHDLC SERPL-MCNC: 97 MG/DL
POTASSIUM BLD-SCNC: 4.3 MMOL/L (ref 3.4–5.3)
PROT SERPL-MCNC: 7.4 G/DL (ref 6.8–8.8)
SODIUM SERPL-SCNC: 138 MMOL/L (ref 133–144)
TRIGL SERPL-MCNC: 131 MG/DL

## 2022-09-19 PROCEDURE — 80061 LIPID PANEL: CPT | Mod: ZL | Performed by: NURSE PRACTITIONER

## 2022-09-19 PROCEDURE — 99213 OFFICE O/P EST LOW 20 MIN: CPT | Performed by: NURSE PRACTITIONER

## 2022-09-19 PROCEDURE — 36415 COLL VENOUS BLD VENIPUNCTURE: CPT | Mod: ZL | Performed by: NURSE PRACTITIONER

## 2022-09-19 PROCEDURE — G0008 ADMIN INFLUENZA VIRUS VAC: HCPCS

## 2022-09-19 PROCEDURE — 80053 COMPREHEN METABOLIC PANEL: CPT | Mod: ZL | Performed by: NURSE PRACTITIONER

## 2022-09-19 PROCEDURE — G0463 HOSPITAL OUTPT CLINIC VISIT: HCPCS | Mod: 25

## 2022-09-19 ASSESSMENT — PAIN SCALES - GENERAL: PAINLEVEL: NO PAIN (0)

## 2022-09-19 NOTE — NURSING NOTE
"Chief Complaint   Patient presents with     Lipids     Thyroid Problem       Initial /70   Pulse 74   Temp 97  F (36.1  C) (Tympanic)   Wt 72.6 kg (160 lb)   SpO2 95%   BMI 22.32 kg/m   Estimated body mass index is 22.32 kg/m  as calculated from the following:    Height as of 4/1/22: 1.803 m (5' 11\").    Weight as of this encounter: 72.6 kg (160 lb).  Medication Reconciliation: complete  Lauren Moreno LPN  "

## 2022-09-19 NOTE — PROGRESS NOTES
Assessment & Plan     Hyperlipidemia LDL goal <100  Stable continue current dose   - Lipid Profile (Chol, Trig, HDL, LDL calc); Future  - Lipid Profile (Chol, Trig, HDL, LDL calc)    Acquired hypothyroidism  Stable continue current dose  - Comprehensive metabolic panel (BMP + Alb, Alk Phos, ALT, AST, Total. Bili, TP); Future  - Comprehensive metabolic panel (BMP + Alb, Alk Phos, ALT, AST, Total. Bili, TP)  TSH   Date Value Ref Range Status   03/18/2022 0.99 0.40 - 4.00 mU/L Final   04/29/2021 1.52 0.40 - 4.00 mU/L Final         Need for prophylactic vaccination and inoculation against influenza  - INFLUENZA, QUAD, HIGH DOSE, PF, 65YR + (FLUZONE HD)  - ADMIN INFLUENZA (For MEDICARE Patients ONLY) []       See Patient Instructions    Return in about 6 months (around 3/19/2023) for chronic disease management.    ZANE Collins [facility] [place] - [place]AYNAM[place] Alvarado is a 77 year old accompanied by his self, presenting for the following health issues:  Lipids, Thyroid Problem, and Imm/Inj (Flu Shot)      HPI     Hyperlipidemia Follow-Up      Are you regularly taking any medication or supplement to lower your cholesterol?   Yes- Zocor    Are you having muscle aches or other side effects that you think could be caused by your cholesterol lowering medication?  No    Hypothyroidism Follow-up  Levothyroxine 50 mcg daily     Since last visit, patient describes the following symptoms: Weight stable, no hair loss, no skin changes, no constipation, no loose stools    Dementia with behavioral disturbance,  Follow[facility]         Review of Systems   CONSTITUTIONAL: NEGATIVE for fever, chills, change in weight  INTEGUMENTARY/SKIN: NEGATIVE for worrisome rashes, moles or lesions  EYES: NEGATIVE for vision changes or irritation  ENT/MOUTH: NEGATIVE for ear, mouth and throat problems  RESP: NEGATIVE for significant cough or SOB  CV: NEGATIVE for chest pain,  palpitations or peripheral edema  GI: NEGATIVE for nausea, abdominal pain, heartburn, or change in bowel habits  : negative for dysuria, hematuria, decreased urinary stream  MUSCULOSKELETAL: NEGATIVE for significant arthralgias or myalgia  NEURO: NEGATIVE for weakness, dizziness or paresthesias  ENDOCRINE: Hx thyroid disease  PSYCHIATRIC: feels like he is doing well at this time       Objective    /70   Pulse 74   Temp 97  F (36.1  C) (Tympanic)   Wt 72.6 kg (160 lb)   SpO2 95%   BMI 22.32 kg/m    Body mass index is 22.32 kg/m .  Physical Exam   GENERAL: alert, no distress and elderly  NECK: no adenopathy, no asymmetry, masses, or scars and thyroid normal to palpation  RESP: lungs clear to auscultation - no rales, rhonchi or wheezes  CV: regular rate and rhythm, normal S1 S2, no S3 or S4, no murmur, click or rub, no peripheral edema and peripheral pulses strong  ABDOMEN: soft, nontender, no hepatosplenomegaly, no masses and bowel sounds normal  NEURO: Normal strength and tone, sensory exam grossly normal, speech normal and cranial nerves 2-12 intact  PSYCH: ask appropriate questions - arrived to appointment 2 hours early   LYMPH: no cervical adenopathy    Results for orders placed or performed in visit on 09/19/22   Lipid Profile (Chol, Trig, HDL, LDL calc)     Status: None   Result Value Ref Range    Cholesterol 141 <200 mg/dL    Triglycerides 131 <150 mg/dL    Direct Measure HDL 44 >=40 mg/dL    LDL Cholesterol Calculated 71 <=100 mg/dL    Non HDL Cholesterol 97 <130 mg/dL    Patient Fasting > 8hrs? Yes     Narrative    Cholesterol  Desirable:  <200 mg/dL    Triglycerides  Normal:  Less than 150 mg/dL  Borderline High:  150-199 mg/dL  High:  200-499 mg/dL  Very High:  Greater than or equal to 500 mg/dL    Direct Measure HDL  Female:  Greater than or equal to 50 mg/dL   Male:  Greater than or equal to 40 mg/dL    LDL Cholesterol  Desirable:  <100mg/dL  Above Desirable:  100-129 mg/dL   Borderline High:   130-159 mg/dL   High:  160-189 mg/dL   Very High:  >= 190 mg/dL    Non HDL Cholesterol  Desirable:  130 mg/dL  Above Desirable:  130-159 mg/dL  Borderline High:  160-189 mg/dL  High:  190-219 mg/dL  Very High:  Greater than or equal to 220 mg/dL   Comprehensive metabolic panel (BMP + Alb, Alk Phos, ALT, AST, Total. Bili, TP)     Status: Abnormal   Result Value Ref Range    Sodium 138 133 - 144 mmol/L    Potassium 4.3 3.4 - 5.3 mmol/L    Chloride 108 94 - 109 mmol/L    Carbon Dioxide (CO2) 29 20 - 32 mmol/L    Anion Gap 1 (L) 3 - 14 mmol/L    Urea Nitrogen 20 7 - 30 mg/dL    Creatinine 1.21 0.66 - 1.25 mg/dL    Calcium 8.6 8.5 - 10.1 mg/dL    Glucose 124 (H) 70 - 99 mg/dL    Alkaline Phosphatase 100 40 - 150 U/L    AST 11 0 - 45 U/L    ALT 12 0 - 70 U/L    Protein Total 7.4 6.8 - 8.8 g/dL    Albumin 3.5 3.4 - 5.0 g/dL    Bilirubin Total 0.3 0.2 - 1.3 mg/dL    GFR Estimate 61 >60 mL/min/1.73m2

## 2022-10-18 DIAGNOSIS — E03.9 HYPOTHYROIDISM, UNSPECIFIED TYPE: ICD-10-CM

## 2022-10-18 DIAGNOSIS — E78.5 HYPERLIPIDEMIA LDL GOAL <100: ICD-10-CM

## 2022-10-20 RX ORDER — SIMVASTATIN 40 MG
TABLET ORAL
Qty: 28 TABLET | Refills: 4 | Status: SHIPPED | OUTPATIENT
Start: 2022-10-20 | End: 2023-02-13

## 2022-10-20 RX ORDER — LEVOTHYROXINE SODIUM 50 UG/1
50 TABLET ORAL DAILY
Qty: 28 TABLET | Refills: 4 | Status: SHIPPED | OUTPATIENT
Start: 2022-10-20 | End: 2023-02-13

## 2022-11-16 DIAGNOSIS — E55.9 VITAMIN D DEFICIENCY: ICD-10-CM

## 2022-11-16 DIAGNOSIS — K59.00 CONSTIPATION, UNSPECIFIED CONSTIPATION TYPE: ICD-10-CM

## 2022-11-18 RX ORDER — VITAMIN B COMPLEX
TABLET ORAL
Qty: 28 TABLET | Refills: 9 | Status: SHIPPED | OUTPATIENT
Start: 2022-11-18 | End: 2023-10-02

## 2022-11-18 RX ORDER — DOCUSATE SODIUM 100 MG/1
CAPSULE, LIQUID FILLED ORAL
Qty: 112 CAPSULE | Refills: 9 | Status: SHIPPED | OUTPATIENT
Start: 2022-11-18 | End: 2023-10-02

## 2023-01-04 ENCOUNTER — OFFICE VISIT (OUTPATIENT)
Dept: FAMILY MEDICINE | Facility: OTHER | Age: 78
End: 2023-01-04
Attending: NURSE PRACTITIONER
Payer: MEDICARE

## 2023-01-04 VITALS
BODY MASS INDEX: 22.32 KG/M2 | DIASTOLIC BLOOD PRESSURE: 82 MMHG | HEART RATE: 84 BPM | TEMPERATURE: 97.2 F | WEIGHT: 160 LBS | OXYGEN SATURATION: 97 % | SYSTOLIC BLOOD PRESSURE: 152 MMHG

## 2023-01-04 DIAGNOSIS — M25.50 MULTIPLE JOINT PAIN: Primary | ICD-10-CM

## 2023-01-04 PROCEDURE — 99213 OFFICE O/P EST LOW 20 MIN: CPT | Performed by: NURSE PRACTITIONER

## 2023-01-04 PROCEDURE — G0463 HOSPITAL OUTPT CLINIC VISIT: HCPCS | Performed by: NURSE PRACTITIONER

## 2023-01-04 RX ORDER — MELOXICAM 7.5 MG/1
7.5 TABLET ORAL DAILY
Qty: 30 TABLET | Refills: 1 | Status: SHIPPED | OUTPATIENT
Start: 2023-01-04 | End: 2023-02-20

## 2023-01-04 NOTE — PROGRESS NOTES
Assessment & Plan     Multiple joint pain  Multiple joint pain. No pain with assessment today.  He continues to walk daily, but has intermittent pain.  He has not had to take anything for the pain regularly.  Offered physical therapy, but feels like his walking is his physical therapy.  He was agreeable to try daily mobic for his discomfort.  If pain continues consider imaging and/or referral to orthopedics   - meloxicam (MOBIC) 7.5 MG tablet; Take 1 tablet (7.5 mg) by mouth daily       See Patient Instructions    No follow-ups on file.    ZANE Collins CNP  [facility] - [place]ado is a 78 year old accompanied by his self, presenting for the following health issues:  Knee Pain (Bilateral knees)      HPI     Pain History:  When did you first notice your pain? - Chronic Pain   Have you seen this provider for your pain in the past?   Yes   Where in your body do you have pain? Bilateral knee.  Right TKA 4/2022  Are you seeing anyone else for your pain? Yes - OA  Had a fall in August.  We did XR the right knee at that time prosthesis in place no new damage.  He continues to have bilateral knee discomfort, pain in the right ankle and right shoulder.  He states he walks at least a mile every day.          Chronic Pain Follow Up:    Location of pain: bilateral knees, right ankle and right shoulder   Analgesia/pain control:    - Recent changes:  Continued pain     - Overall control: Tolerable with discomfort    - Current treatments: stays mobil.  Occasional OTC pain medication but not regularly    Adherence:     - Do you ever take more pain medicine than prescribed? No    - When did you take your last dose of pain medicine?  unknown   Adverse effects: No   PDMP Review       Value Time User    State PDMP site checked  Yes 9/19/2022  8:49 AM Genny Dang APRN CNP        Last CSA Agreement:   CSA -- Patient Level:    CSA: None found at the patient level.       Last UDS:        Review of Systems   CONSTITUTIONAL: NEGATIVE for fever, chills, change in weight  INTEGUMENTARY/SKIN: NEGATIVE for worrisome rashes, moles or lesions  RESP: NEGATIVE for significant cough or SOB  CV: NEGATIVE for chest pain, palpitations or peripheral edema  GI: hernia on the right side - repaired   : negative for dysuria, hematuria, decreased urinary stream  MUSCULOSKELETAL: multiple joint pain   NEURO: NEGATIVE for weakness, dizziness or paresthesias      Objective    BP (!) 152/82   Pulse 84   Temp 97.2  F (36.2  C) (Tympanic)   Wt 72.6 kg (160 lb)   SpO2 97%   BMI 22.32 kg/m    Body mass index is 22.32 kg/m .  Physical Exam   GENERAL: alert, no distress and elderly  RESP: lungs clear to auscultation - no rales, rhonchi or wheezes  CV: regular rate and rhythm, normal S1 S2, no S3 or S4, no murmur, click or rub, no peripheral edema and peripheral pulses strong  ABDOMEN: soft, nontender, no hepatosplenomegaly, no masses and bowel sounds normal  MS: no gross musculoskeletal defects noted, no edema  SKIN: no suspicious lesions or rashes  NEURO: Normal strength and tone, mentation intact and speech normal

## 2023-01-11 DIAGNOSIS — M25.50 MULTIPLE JOINT PAIN: ICD-10-CM

## 2023-01-16 RX ORDER — MELOXICAM 7.5 MG/1
7.5 TABLET ORAL DAILY
Qty: 30 TABLET | Refills: 1 | OUTPATIENT
Start: 2023-01-16

## 2023-01-17 DIAGNOSIS — M25.50 MULTIPLE JOINT PAIN: ICD-10-CM

## 2023-01-17 RX ORDER — MELOXICAM 7.5 MG/1
7.5 TABLET ORAL DAILY
Qty: 30 TABLET | Refills: 1 | OUTPATIENT
Start: 2023-01-17

## 2023-01-17 NOTE — TELEPHONE ENCOUNTER
meloxicam      Last Written Prescription Date:  1/4/23  Last Fill Quantity: 30,   # refills: 1  Last Office Visit:   Future Office visit:    Next 5 appointments (look out 90 days)    Mar 20, 2023  2:10 PM  (Arrive by 1:55 PM)  SHORT with ZANE Navarrete CNP  [facility] [place] ([facility] [place] ) [address]  [place] [address]  [phone]           Routing refill request to provider for review/approval because:  Medication is reported/historical

## 2023-02-09 DIAGNOSIS — E03.9 HYPOTHYROIDISM, UNSPECIFIED TYPE: ICD-10-CM

## 2023-02-09 DIAGNOSIS — E78.5 HYPERLIPIDEMIA LDL GOAL <100: ICD-10-CM

## 2023-02-09 DIAGNOSIS — K21.00 GASTROESOPHAGEAL REFLUX DISEASE WITH ESOPHAGITIS WITHOUT HEMORRHAGE: ICD-10-CM

## 2023-02-13 RX ORDER — LEVOTHYROXINE SODIUM 50 UG/1
50 TABLET ORAL DAILY
Qty: 28 TABLET | Refills: 4 | Status: SHIPPED | OUTPATIENT
Start: 2023-02-13 | End: 2023-10-02

## 2023-02-13 RX ORDER — SIMVASTATIN 40 MG
TABLET ORAL
Qty: 28 TABLET | Refills: 4 | Status: SHIPPED | OUTPATIENT
Start: 2023-02-13 | End: 2023-10-02

## 2023-02-13 RX ORDER — PANTOPRAZOLE SODIUM 40 MG/1
40 TABLET, DELAYED RELEASE ORAL DAILY
Qty: 28 TABLET | Refills: 10 | Status: SHIPPED | OUTPATIENT
Start: 2023-02-13 | End: 2023-10-02

## 2023-02-17 DIAGNOSIS — M25.50 MULTIPLE JOINT PAIN: ICD-10-CM

## 2023-02-17 NOTE — TELEPHONE ENCOUNTER
MOBIC 7.5MG       Last Written Prescription Date:  1-4-23  Last Fill Quantity: 30,   # refills: 1  Last Office Visit: 1-4-23  Future Office visit:    Next 5 appointments (look out 90 days)    Mar 20, 2023  2:10 PM  (Arrive by 1:55 PM)  SHORT with ZANE Navarrete CNP  [facility] [place] ([facility] [place] ) [address]  [place] [address]  [phone]           Routing refill request to provider for review/approval because:  Drug not on the [facility], [facility] or  Health refill protocol or controlled substance

## 2023-02-20 RX ORDER — MELOXICAM 7.5 MG/1
7.5 TABLET ORAL DAILY
Qty: 30 TABLET | Refills: 1 | Status: SHIPPED | OUTPATIENT
Start: 2023-02-20 | End: 2023-03-10

## 2023-03-08 DIAGNOSIS — M25.50 MULTIPLE JOINT PAIN: ICD-10-CM

## 2023-03-10 RX ORDER — MELOXICAM 7.5 MG/1
7.5 TABLET ORAL DAILY
Qty: 30 TABLET | Refills: 1 | Status: SHIPPED | OUTPATIENT
Start: 2023-03-10 | End: 2023-05-05

## 2023-03-10 NOTE — TELEPHONE ENCOUNTER
MELOXICAM 7.5MG TABLET       Last Written Prescription Date:  2/20/23  Last Fill Quantity: 30,   # refills: 1  Last Office Visit: 1/4/23  Future Office visit:    Next 5 appointments (look out 90 days)    Mar 20, 2023  2:00 PM  (Arrive by 1:45 PM)  SHORT with ZANE Navarrete CNP  [facility] ([facility] ) [address]  [place] [address]  [phone]           Routing refill request to provider for review/approval because:    NSAID Medications Failed 03/08/2023 05:41 PM   Protocol Details  Blood pressure under 140/90 in past 12 months    Patient is age 6-64 years    Normal CBC on file in past 12 months     BP Readings from Last 3 Encounters:   01/04/23 (!) 152/82   09/19/22 114/70   08/09/22 120/70     CBC RESULTS: Recent Labs   Lab Test 04/02/22  0507   WBC 17.6*   RBC 4.48   HGB 13.3   HCT 41.1   MCV 92   MCH 29.7   MCHC 32.4   RDW 13.8

## 2023-03-17 NOTE — PROGRESS NOTES
Assessment & Plan     Acquired hypothyroidism  Did not do lab prior to leaving clinic - will try to get scheduled   - TSH WITH FREE T4 REFLEX; Future    Dementia with behavioral disturbance  Continues to follow up with psychiatry for his mental health       Ordering of each unique test         See Patient Instructions    Return in about 6 months (around 9/20/2023) for Physical Exam.    ZANE Collins CNP  [facility] - [place] Alvarado is a 78 year old accompanied by his self, presenting for the following health issues:  Lipids and Thyroid Problem      HPI       Christiano was wondering about a colonoscopy, last colonoscopy 7/22/21 no polyps - per dr Guzmán's note no further colonoscopies needed      Hyperlipidemia Follow-Up      Are you regularly taking any medication or supplement to lower your cholesterol?   Yes- Simvastatin     Are you having muscle aches or other side effects that you think could be caused by your cholesterol lowering medication?  No    Hypothyroidism Follow-up  Levothyroxine 50 mcg daily     Since last visit, patient describes the following symptoms: Weight stable, no hair loss, no skin changes, no constipation, no loose stools          Review of Systems   CONSTITUTIONAL: NEGATIVE for fever, chills, change in weight  INTEGUMENTARY/SKIN: NEGATIVE for worrisome rashes, moles or lesions  EYES: NEGATIVE for vision changes or irritation  ENT/MOUTH: NEGATIVE for ear, mouth and throat problems  RESP: NEGATIVE for significant cough or SOB  CV: NEGATIVE for chest pain, palpitations or peripheral edema  GI: NEGATIVE for nausea, abdominal pain, heartburn, or change in bowel habits  : negative for dysuria, hematuria, decreased urinary stream, erectile dysfunction  MUSCULOSKELETAL: NEGATIVE for significant arthralgias or myalgia  NEURO: NEGATIVE for weakness, dizziness or paresthesias  ENDOCRINE: NEGATIVE for temperature intolerance, skin/hair changes  PSYCHIATRIC: some  forgetfulness       Objective    /70   Pulse 72   Temp (!) 96.4  F (35.8  C) (Tympanic)   Wt 74.8 kg (165 lb)   SpO2 97%   BMI 23.01 kg/m    Body mass index is 23.01 kg/m .  Physical Exam   GENERAL: alert and no distress  HENT: normal cephalic/atraumatic, right ear: normal: no effusions, no erythema, normal landmarks, left ear: occluded with wax, nose and mouth without ulcers or lesions, oropharynx clear and oral mucous membranes moist  NECK: no adenopathy, no asymmetry, masses, or scars and thyroid normal to palpation  RESP: lungs clear to auscultation - no rales, rhonchi or wheezes  CV: regular rate and rhythm, normal S1 S2, no S3 or S4, no murmur, click or rub, no peripheral edema and peripheral pulses strong  ABDOMEN: soft, nontender, no hepatosplenomegaly, no masses and bowel sounds normal  MS: no gross musculoskeletal defects noted, no edema  SKIN: no suspicious lesions or rashes  NEURO: Normal strength and tone, sensory exam grossly normal and forgetful    Due to TSH level

## 2023-03-20 ENCOUNTER — OFFICE VISIT (OUTPATIENT)
Dept: FAMILY MEDICINE | Facility: OTHER | Age: 78
End: 2023-03-20
Attending: NURSE PRACTITIONER
Payer: MEDICARE

## 2023-03-20 VITALS
OXYGEN SATURATION: 97 % | WEIGHT: 165 LBS | HEART RATE: 72 BPM | BODY MASS INDEX: 23.01 KG/M2 | SYSTOLIC BLOOD PRESSURE: 130 MMHG | TEMPERATURE: 96.4 F | DIASTOLIC BLOOD PRESSURE: 70 MMHG

## 2023-03-20 DIAGNOSIS — F03.918 DEMENTIA WITH BEHAVIORAL DISTURBANCE (H): ICD-10-CM

## 2023-03-20 DIAGNOSIS — E03.9 ACQUIRED HYPOTHYROIDISM: Primary | ICD-10-CM

## 2023-03-20 PROCEDURE — 99213 OFFICE O/P EST LOW 20 MIN: CPT | Performed by: NURSE PRACTITIONER

## 2023-03-20 PROCEDURE — G0463 HOSPITAL OUTPT CLINIC VISIT: HCPCS | Mod: 25

## 2023-03-20 PROCEDURE — G0463 HOSPITAL OUTPT CLINIC VISIT: HCPCS

## 2023-03-20 NOTE — Clinical Note
He did not do his labs prior to leaving.  Can we schedule him for a lab only   Genny DEGROOT [facility]-BC Family Nurse Practitioner

## 2023-04-04 ENCOUNTER — LAB REQUISITION (OUTPATIENT)
Dept: LAB | Facility: HOSPITAL | Age: 78
End: 2023-04-04
Payer: MEDICARE

## 2023-04-04 DIAGNOSIS — E03.9 HYPOTHYROIDISM, UNSPECIFIED: ICD-10-CM

## 2023-04-04 LAB — TSH SERPL DL<=0.005 MIU/L-ACNC: 2.11 UIU/ML (ref 0.3–4.2)

## 2023-04-04 PROCEDURE — 84443 ASSAY THYROID STIM HORMONE: CPT | Performed by: NURSE PRACTITIONER

## 2023-05-05 DIAGNOSIS — M25.50 MULTIPLE JOINT PAIN: ICD-10-CM

## 2023-05-05 RX ORDER — MELOXICAM 7.5 MG/1
TABLET ORAL
Qty: 30 TABLET | Refills: 11 | Status: SHIPPED | OUTPATIENT
Start: 2023-05-05 | End: 2024-03-25

## 2023-05-05 NOTE — TELEPHONE ENCOUNTER
meloxicam (MOBIC) 7.5 MG tablet     Last Written Prescription Date:  3-10-23  Last Fill Quantity: 30,   # refills: 1  Last Office Visit: 3-20-23  Future Office visit:       Routing refill request to provider for review/approval because:  Drug not on the [facility], [facility] or [facility] refill protocol or controlled substance
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
## 2023-07-19 ENCOUNTER — OFFICE VISIT (OUTPATIENT)
Dept: FAMILY MEDICINE | Facility: OTHER | Age: 78
End: 2023-07-19
Attending: NURSE PRACTITIONER
Payer: MEDICARE

## 2023-07-19 VITALS
BODY MASS INDEX: 22.51 KG/M2 | OXYGEN SATURATION: 97 % | SYSTOLIC BLOOD PRESSURE: 138 MMHG | DIASTOLIC BLOOD PRESSURE: 70 MMHG | HEART RATE: 72 BPM | TEMPERATURE: 96.6 F | WEIGHT: 161.4 LBS

## 2023-07-19 DIAGNOSIS — F03.918 DEMENTIA WITH BEHAVIORAL DISTURBANCE (H): ICD-10-CM

## 2023-07-19 PROCEDURE — 99213 OFFICE O/P EST LOW 20 MIN: CPT | Performed by: NURSE PRACTITIONER

## 2023-07-19 PROCEDURE — G0463 HOSPITAL OUTPT CLINIC VISIT: HCPCS

## 2023-07-19 ASSESSMENT — PAIN SCALES - GENERAL: PAINLEVEL: SEVERE PAIN (7)

## 2023-08-11 NOTE — MR AVS SNAPSHOT
After Visit Summary   8/22/2018    Christiano Leon    MRN: [id number]           Patient Information     Date Of Birth          1945        Visit Information        Provider Department      8/22/2018 2:00 PM Carmen Kay APRN CNP [facility]        Care Instructions    Follow up with audiogram    Return to ENT as needed.      Thank you for allowing Carmen Kay CNP and our ENT team to participate in your care.  If your medications are too expensive, please give the nurse a call.  We can possibly change this medication.  If you have a scheduling or an appointment question please contact [facility] Unit Coordinator at their direct line [phone].   ALL nursing questions or concerns can be directed to your ENT nurse at: [phone]- Gigi            Follow-ups after your visit        Follow-up notes from your care team     Return if symptoms worsen or fail to improve.      Your next 10 appointments already scheduled     Aug 23, 2018 10:40 AM CDT   (Arrive by 10:25 AM)   New Visit with Alfred Rose DO    ORTHOPEDICS ([facility] )    [address] [facility] [id number] [phone]            Oct 04, 2018  8:15 AM CDT   (Arrive by 8:00 AM)   Office Visit with ROBERT Akbar MD   [facility] ([facility] )    [address] [facility] [facility] [address]   [phone]           Bring a current list of meds and any records pertaining to this visit.  For Physicals, please bring immunization records and any forms needing to be filled out.  Please arrive 15 minutes early to complete paperwork and register.              Who to contact     If you have questions or need follow up information about today's clinic visit or your schedule please contact [facility] directly at [phone].  Normal or non-critical lab and imaging results will be communicated to you by MyChart, letter or phone within 4 business days after  "the clinic has received the results. If you do not hear from us within 7 days, please contact the clinic through [employer] or phone. If you have a critical or abnormal lab result, we will notify you by phone as soon as possible.  Submit refill requests through [employer] or call your pharmacy and they will forward the refill request to us. Please allow 3 business days for your refill to be completed.          Additional Information About Your Visit        Care EveryWhere ID     This is your Care EveryWhere ID. This could be used by other organizations to access your [place] medical records  [id number]        Your Vitals Were     Pulse Temperature Height Pulse Oximetry BMI (Body Mass Index)       73 97.3  F (36.3  C) (Tympanic) 5' 11\" (1.803 m) 97% 22.87 kg/m2        Blood Pressure from Last 3 Encounters:   08/22/18 123/68   08/14/18 106/66   08/09/18 102/68    Weight from Last 3 Encounters:   08/22/18 164 lb (74.4 kg)   08/14/18 164 lb (74.4 kg)   08/09/18 164 lb (74.4 kg)              Today, you had the following     No orders found for display       Primary Care Provider Office Phone # Fax #    R Dustin Akbar [phone]       [address] [place] [address]        Equal Access to Services     [facility] AH: Hadii kellee posadas hadasho Soolivia, waaxda luqadaha, qaybta kaalmada adeegyada, stoney roberson . So [facility] [phone].    ATENCIÓN: Si habla español, tiene a pierce disposición servicios gratuitos de asistencia lingüística. Llame al [phone].    We comply with applicable federal civil rights laws and [place] laws. We do not discriminate on the basis of race, color, national origin, age, disability, sex, sexual orientation, or gender identity.            Thank you!     Thank you for choosing [facility]  for your care. Our goal is always to provide you with excellent care. Hearing back from our patients is one way we can continue to improve our " services. Please take a few minutes to complete the written survey that you may receive in the mail after your visit with us. Thank you!             Your Updated Medication List - Protect others around you: Learn how to safely use, store and throw away your medicines at www.disposemymeds.org.          This list is accurate as of 8/22/18  2:18 PM.  Always use your most recent med list.                   Brand Name Dispense Instructions for use Diagnosis    ARTIFICIAL TEAR OP      Apply 1 drop to eye 4 times daily        cholecalciferol 1000 UNIT tablet    vitamin D3    100 tablet    TAKE 1 TABLET BY MOUTH ONCE DAILY    Vitamin D deficiency        MG capsule   Generic drug:  docusate sodium     100 capsule    TAKE 2 CAPSULES (200MG) BY MOUTH TWICE DAILY    Constipation, unspecified constipation type       levothyroxine 50 MCG tablet    SYNTHROID/LEVOTHROID    30 tablet    TAKE 1 TABLET DAILY BY MOUTH - GENERIC FOR SYNTHROID    Hypothyroidism, unspecified type       SEROQUEL PO      Take 900 mg by mouth At Bedtime        simvastatin 40 MG tablet    ZOCOR    90 tablet    Take 1 tablet (40 mg) by mouth At Bedtime    Hyperlipidemia LDL goal <100       TYLENOL PO      Take 1,000 mg by mouth every 6 hours as needed for mild pain or fever           Oriented - self; Oriented - place; Oriented - time

## 2023-09-06 ENCOUNTER — MEDICAL CORRESPONDENCE (OUTPATIENT)
Dept: HEALTH INFORMATION MANAGEMENT | Facility: CLINIC | Age: 78
End: 2023-09-06

## 2023-09-06 ENCOUNTER — TELEPHONE (OUTPATIENT)
Dept: FAMILY MEDICINE | Facility: OTHER | Age: 78
End: 2023-09-06

## 2023-09-07 NOTE — PROGRESS NOTES
Assessment & Plan     Chronic right shoulder pain  Acute on chronic right shoulder pain for the past month although some spontaneous improvement recently.  Exam suspicious for rotator cuff pathology although would not proceed with surgery at this point.  May be also a component of tendinitis/bursitis. Hold off on imaging and continue symptomatic management with short interval follow-up.  - diclofenac (VOLTAREN) 1 % topical gel; Apply 4 g topically 4 times daily  - X-ray, possibly MRI if not improving  - Consider trial of diagnostic/therapeutic corticosteroid injection    I spent a total of 34 minutes on the day of the visit.   Time spent by me doing chart review, history and exam, documentation and further activities per the note       Follow-up 2 to 4 weeks.    Peterson Sauceda MD  [facility] - [place] Alvarado is a 78 year old, presenting for the following health issues:  Shoulder Pain (Right shoulder)        9/12/2023    10:33 AM   Additional Questions   Roomed by Lauren Moreno CMA   Accompanied by Self         9/12/2023    10:33 AM   Patient Reported Additional Medications   Patient reports taking the following new medications None       HPI     Pain History:  When did you first notice your pain? About a month was tackled   Have you seen anyone else for your pain? No  How has your pain affected your ability to work? Not applicable  Where in your body do you have pain?  Right shoulder    -About a month of acute on chronic right shoulder pain  -Anterior  -Worse with activity  -Fairly constant aching  -Cannot really do anything higher than shoulder level but this is not particularly new  -Grinding sensation  -No extremity radiculopathy  -No neck symptoms  -Overall has been gradually improving    Review of Systems   Constitutional, HEENT, cardiovascular, pulmonary, gi and gu systems are negative, except as otherwise noted.      Objective    /80   Pulse 88   Temp 97  F (36.1  C)  (Tympanic)   Wt 74.8 kg (165 lb)   SpO2 96%   BMI 23.01 kg/m    Body mass index is 23.01 kg/m .  Physical Exam  Vitals reviewed.   Constitutional:       General: He is not in acute distress.     Appearance: Normal appearance. He is not toxic-appearing.   Musculoskeletal:      Right shoulder: Tenderness and crepitus present. No swelling, deformity or bony tenderness.      Comments: Right shoulder: No gross for me to inspection.  Mildly tender to palpation particularly in anterior shoulder.  Active ROM limited to below shoulder level.  Slightly decreased strength in all directions relative to left shoulder.   Skin:     General: Skin is warm and dry.   Neurological:      General: No focal deficit present.      Mental Status: He is alert and oriented to person, place, and time.   Psychiatric:         Mood and Affect: Mood normal.         Behavior: Behavior normal.

## 2023-09-12 ENCOUNTER — OFFICE VISIT (OUTPATIENT)
Dept: FAMILY MEDICINE | Facility: OTHER | Age: 78
End: 2023-09-12
Attending: STUDENT IN AN ORGANIZED HEALTH CARE EDUCATION/TRAINING PROGRAM
Payer: MEDICARE

## 2023-09-12 VITALS
WEIGHT: 165 LBS | DIASTOLIC BLOOD PRESSURE: 80 MMHG | HEART RATE: 88 BPM | TEMPERATURE: 97 F | SYSTOLIC BLOOD PRESSURE: 130 MMHG | BODY MASS INDEX: 23.01 KG/M2 | OXYGEN SATURATION: 96 %

## 2023-09-12 DIAGNOSIS — M25.511 CHRONIC RIGHT SHOULDER PAIN: Primary | ICD-10-CM

## 2023-09-12 DIAGNOSIS — G89.29 CHRONIC RIGHT SHOULDER PAIN: Primary | ICD-10-CM

## 2023-09-12 PROCEDURE — G0463 HOSPITAL OUTPT CLINIC VISIT: HCPCS

## 2023-09-12 PROCEDURE — 99214 OFFICE O/P EST MOD 30 MIN: CPT | Performed by: STUDENT IN AN ORGANIZED HEALTH CARE EDUCATION/TRAINING PROGRAM

## 2023-09-12 RX ORDER — CLONIDINE HYDROCHLORIDE 0.1 MG/1
TABLET ORAL
COMMUNITY
Start: 2023-08-30

## 2023-09-12 ASSESSMENT — PAIN SCALES - GENERAL: PAINLEVEL: SEVERE PAIN (7)

## 2023-09-20 ENCOUNTER — OFFICE VISIT (OUTPATIENT)
Dept: FAMILY MEDICINE | Facility: OTHER | Age: 78
End: 2023-09-20
Attending: NURSE PRACTITIONER
Payer: MEDICARE

## 2023-09-20 VITALS
TEMPERATURE: 97.5 F | BODY MASS INDEX: 23.1 KG/M2 | SYSTOLIC BLOOD PRESSURE: 130 MMHG | DIASTOLIC BLOOD PRESSURE: 80 MMHG | OXYGEN SATURATION: 96 % | WEIGHT: 165 LBS | HEIGHT: 71 IN | HEART RATE: 71 BPM

## 2023-09-20 DIAGNOSIS — E03.9 ACQUIRED HYPOTHYROIDISM: ICD-10-CM

## 2023-09-20 DIAGNOSIS — Z00.00 ENCOUNTER FOR MEDICARE ANNUAL WELLNESS EXAM: Primary | ICD-10-CM

## 2023-09-20 DIAGNOSIS — F03.918 DEMENTIA WITH BEHAVIORAL DISTURBANCE (H): ICD-10-CM

## 2023-09-20 DIAGNOSIS — E78.5 HYPERLIPIDEMIA LDL GOAL <100: ICD-10-CM

## 2023-09-20 LAB
ALBUMIN SERPL BCG-MCNC: 4 G/DL (ref 3.5–5.2)
ALP SERPL-CCNC: 86 U/L (ref 40–129)
ALT SERPL W P-5'-P-CCNC: 9 U/L (ref 0–70)
ANION GAP SERPL CALCULATED.3IONS-SCNC: 10 MMOL/L (ref 7–15)
AST SERPL W P-5'-P-CCNC: 18 U/L (ref 0–45)
BILIRUB SERPL-MCNC: 0.3 MG/DL
BUN SERPL-MCNC: 16.9 MG/DL (ref 8–23)
CALCIUM SERPL-MCNC: 8.6 MG/DL (ref 8.8–10.2)
CHLORIDE SERPL-SCNC: 107 MMOL/L (ref 98–107)
CHOLEST SERPL-MCNC: 113 MG/DL
CREAT SERPL-MCNC: 1.16 MG/DL (ref 0.67–1.17)
DEPRECATED HCO3 PLAS-SCNC: 26 MMOL/L (ref 22–29)
EGFRCR SERPLBLD CKD-EPI 2021: 64 ML/MIN/1.73M2
GLUCOSE SERPL-MCNC: 98 MG/DL (ref 70–99)
HDLC SERPL-MCNC: 46 MG/DL
LDLC SERPL CALC-MCNC: 54 MG/DL
NONHDLC SERPL-MCNC: 67 MG/DL
POTASSIUM SERPL-SCNC: 4.3 MMOL/L (ref 3.4–5.3)
PROT SERPL-MCNC: 6.5 G/DL (ref 6.4–8.3)
SODIUM SERPL-SCNC: 143 MMOL/L (ref 136–145)
TRIGL SERPL-MCNC: 65 MG/DL
TSH SERPL DL<=0.005 MIU/L-ACNC: 1.54 UIU/ML (ref 0.3–4.2)

## 2023-09-20 PROCEDURE — 80061 LIPID PANEL: CPT | Mod: ZL | Performed by: NURSE PRACTITIONER

## 2023-09-20 PROCEDURE — 80053 COMPREHEN METABOLIC PANEL: CPT | Mod: ZL | Performed by: NURSE PRACTITIONER

## 2023-09-20 PROCEDURE — 36415 COLL VENOUS BLD VENIPUNCTURE: CPT | Mod: ZL | Performed by: NURSE PRACTITIONER

## 2023-09-20 PROCEDURE — G0008 ADMIN INFLUENZA VIRUS VAC: HCPCS

## 2023-09-20 PROCEDURE — 84443 ASSAY THYROID STIM HORMONE: CPT | Mod: ZL | Performed by: NURSE PRACTITIONER

## 2023-09-20 PROCEDURE — G0439 PPPS, SUBSEQ VISIT: HCPCS | Performed by: NURSE PRACTITIONER

## 2023-09-20 ASSESSMENT — ENCOUNTER SYMPTOMS
HEMATOCHEZIA: 0
WEAKNESS: 0
PALPITATIONS: 0
HEARTBURN: 0
JOINT SWELLING: 0
COUGH: 0
SORE THROAT: 0
DYSURIA: 0
CONSTIPATION: 0
MYALGIAS: 0
FREQUENCY: 0
DIZZINESS: 0
NAUSEA: 0
PARESTHESIAS: 0
HEADACHES: 0
HEMATURIA: 0
DIARRHEA: 0
CHILLS: 0
FEVER: 0
ABDOMINAL PAIN: 0
EYE PAIN: 0
NERVOUS/ANXIOUS: 0
ARTHRALGIAS: 0
SHORTNESS OF BREATH: 0

## 2023-09-20 ASSESSMENT — ANXIETY QUESTIONNAIRES
GAD7 TOTAL SCORE: 0
GAD7 TOTAL SCORE: 0
7. FEELING AFRAID AS IF SOMETHING AWFUL MIGHT HAPPEN: NOT AT ALL
2. NOT BEING ABLE TO STOP OR CONTROL WORRYING: NOT AT ALL
3. WORRYING TOO MUCH ABOUT DIFFERENT THINGS: NOT AT ALL
6. BECOMING EASILY ANNOYED OR IRRITABLE: NOT AT ALL
1. FEELING NERVOUS, ANXIOUS, OR ON EDGE: NOT AT ALL
5. BEING SO RESTLESS THAT IT IS HARD TO SIT STILL: NOT AT ALL

## 2023-09-20 ASSESSMENT — PATIENT HEALTH QUESTIONNAIRE - PHQ9
SUM OF ALL RESPONSES TO PHQ QUESTIONS 1-9: 1
5. POOR APPETITE OR OVEREATING: NOT AT ALL

## 2023-09-20 ASSESSMENT — ACTIVITIES OF DAILY LIVING (ADL)
CURRENT_FUNCTION: MEDICATION ADMINISTRATION REQUIRES ASSISTANCE
CURRENT_FUNCTION: PREPARING MEALS REQUIRES ASSISTANCE
CURRENT_FUNCTION: MONEY MANAGEMENT REQUIRES ASSISTANCE
CURRENT_FUNCTION: TRANSPORTATION REQUIRES ASSISTANCE

## 2023-09-20 ASSESSMENT — PAIN SCALES - GENERAL: PAINLEVEL: SEVERE PAIN (6)

## 2023-09-20 NOTE — PATIENT INSTRUCTIONS
Patient Education   Personalized Prevention Plan  You are due for the preventive services outlined below.  Your care team is available to assist you in scheduling these services.  If you have already completed any of these items, please share that information with your care team to update in your medical record.  Health Maintenance Due   Topic Date Due     Diptheria Tetanus Pertussis (DTAP/TDAP/TD) Vaccine (1 - Tdap) 11/28/2007     Zoster (Shingles) Vaccine (2 of 3) 09/07/2012     Annual Wellness Visit  07/13/2013     Discuss Advance Care Planning  07/10/2022     COVID-19 Vaccine (5 - Moderna series) 03/08/2023     Flu Vaccine (1) 09/01/2023     Cholesterol Lab  09/19/2023     FALL RISK ASSESSMENT  09/19/2023     Preventive Health Recommendations  See your health care provider every year to  Review health changes.   Discuss preventive care.    Review your medicines if your doctor has prescribed any.  Talk with your health care provider about whether you should have a test to screen for prostate cancer (PSA).  Every 3 years, have a diabetes test (fasting glucose). If you are at risk for diabetes, you should have this test more often.  Every 5 years, have a cholesterol test. Have this test more often if you are at risk for high cholesterol or heart disease.   Every 10 years, have a colonoscopy. Or, have a yearly FIT test (stool test). These exams will check for colon cancer.  Talk to with your health care provider about screening for Abdominal Aortic Aneurysm if you have a family history of AAA or have a history of smoking.    Shots:   Get a flu shot each year.   Get a tetanus shot every 10 years.   Talk to your doctor about your pneumonia vaccines. There are now two you should receive - Pneumovax (PPSV 23) and Prevnar (PCV 13).  Talk to your pharmacist about a shingles vaccine.   Talk to your doctor about the hepatitis B vaccine.    Nutrition:   Eat at least 5 servings of fruits and vegetables each day.   Eat  whole-grain bread, whole-wheat pasta and brown rice instead of white grains and rice.   Get adequate Calcium and Vitamin D.     Lifestyle  Exercise for at least 150 minutes a week (30 minutes a day, 5 days a week). This will help you control your weight and prevent disease.   Limit alcohol to one drink per day.   No smoking.   Wear sunscreen to prevent skin cancer.   See your dentist every six months for an exam and cleaning.   See your eye doctor every 1 to 2 years to screen for conditions such as glaucoma, macular degeneration and cataracts.    Personalized Prevention Plan  You are due for the preventive services outlined below.  Your care team is available to assist you in scheduling these services.  If you have already completed any of these items, please share that information with your care team to update in your medical record.  Health Maintenance   Topic Date Due     DTAP/TDAP/TD IMMUNIZATION (1 - Tdap) 11/28/2007     ZOSTER IMMUNIZATION (2 of 3) 09/07/2012     MEDICARE ANNUAL WELLNESS VISIT  07/13/2013     ADVANCE CARE PLANNING  07/10/2022     COVID-19 Vaccine (5 - Moderna series) 03/08/2023     INFLUENZA VACCINE (1) 09/01/2023     LIPID  09/19/2023     FALL RISK ASSESSMENT  09/19/2023     TSH W/FREE T4 REFLEX  04/04/2024     PHQ-2 (once per calendar year)  Completed     Pneumococcal Vaccine: 65+ Years  Completed     IPV IMMUNIZATION  Aged Out     HPV IMMUNIZATION  Aged Out     MENINGITIS IMMUNIZATION  Aged Out

## 2023-09-20 NOTE — PROGRESS NOTES
"SUBJECTIVE:   Christiano is a 78 year old who presents for Preventive Visit.      Are you in the first 12 months of your Medicare coverage?  No    Healthy Habits:     In general, how would you rate your overall health?  Good    Frequency of exercise:  6-7 days/week    Duration of exercise:  Greater than 60 minutes    Do you usually eat at least 4 servings of fruit and vegetables a day, include whole grains    & fiber and avoid regularly eating high fat or \"junk\" foods?  Yes    Taking medications regularly:  Yes    Medication side effects:  Not applicable    Ability to successfully perform activities of daily living:  Transportation requires assistance, preparing meals requires assistance, medication administration requires assistance and money management requires assistance    Home Safety:  No safety concerns identified    Hearing Impairment:  No hearing concerns    In the past 6 months, have you been bothered by leaking of urine?  No    In general, how would you rate your overall mental or emotional health?  Fair    Additional concerns today:  Yes          Have you ever done Advance Care Planning? (For example, a Health Directive, POLST, or a discussion with a medical provider or your loved ones about your wishes): No, advance care planning information given to patient to review.  Patient declined advance care planning discussion at this time.       Fall risk  Fallen 2 or more times in the past year?: No  Any fall with injury in the past year?: No    Cognitive Screening   1) Repeat 3 items (Leader, Season, Table)    2) Clock draw: NORMAL  3) 3 item recall: Recalls 2 objects   Results: NORMAL clock, 1-2 items recalled: COGNITIVE IMPAIRMENT LESS LIKELY    Mini-CogTM Copyright GABRIEL Ngo. Licensed by the author for use in [facility]; reprinted with permission (david@.[facility]). All rights reserved.      Do you have sleep apnea, excessive snoring or daytime drowsiness? : takes a nap in the afternoon     Reviewed and " updated as needed this visit by clinical staff   Tobacco  Allergies  Meds              Reviewed and updated as needed this visit by Provider                 Social History     Tobacco Use     Smoking status: Former     Packs/day: 0.50     Types: Cigarettes     Quit date: 1993     Years since quittin.3     Passive exposure: Past     Smokeless tobacco: Never   Substance Use Topics     Alcohol use: No             2023    12:47 PM   Alcohol Use   Prescreen: >3 drinks/day or >7 drinks/week? No     Do you have a current opioid prescription? No  Do you use any other controlled substances or medications that are not prescribed by a provider? None        Hyperlipidemia Follow-Up    Are you regularly taking any medication or supplement to lower your cholesterol?   Yes- Zocor  Are you having muscle aches or other side effects that you think could be caused by your cholesterol lowering medication?  No    Dementia   Has virtual psychiatry at [facility] and has county worker     Social History     Tobacco Use     Smoking status: Former     Packs/day: 0.50     Types: Cigarettes     Quit date: 1993     Years since quittin.3     Passive exposure: Past     Smokeless tobacco: Never   Vaping Use     Vaping Use: Never used   Substance Use Topics     Alcohol use: No         2021     2:00 PM 2021     2:00 PM 2023    12:53 PM   PHQ   PHQ-9 Total Score 0 0 1   Q9: Thoughts of better off dead/self-harm past 2 weeks Not at all Not at all Not at all         2021     2:00 PM 2021     2:00 PM 2023    12:53 PM   MIGUEL-7 SCORE   Total Score 0 0 0         2023    12:53 PM   Last PHQ-9   1.  Little interest or pleasure in doing things 0   2.  Feeling down, depressed, or hopeless 0   3.  Trouble falling or staying asleep, or sleeping too much 0   4.  Feeling tired or having little energy 0   5.  Poor appetite or overeating 0   6.  Feeling bad about yourself 1   7.  Trouble concentrating 0    8.  Moving slowly or restless 0   Q9: Thoughts of better off dead/self-harm past 2 weeks 0   PHQ-9 Total Score 1         9/20/2023    12:53 PM   MIGUEL-7    1. Feeling nervous, anxious, or on edge 0   2. Not being able to stop or control worrying 0   3. Worrying too much about different things 0   4. Trouble relaxing 0   5. Being so restless that it is hard to sit still 0   6. Becoming easily annoyed or irritable 0   7. Feeling afraid, as if something awful might happen 0   MIGUEL-7 Total Score 0       Suicide Assessment Five-step Evaluation and Treatment (SAFE-T)    Hypothyroidism Follow-up    Since last visit, patient describes the following symptoms: Weight stable, no hair loss, no skin changes, no constipation, no loose stools    Current providers sharing in care for this patient include:   Patient Care Team:  Genny Dang APRN CNP as PCP - General (Family Medicine)  Genny Dang APRN CNP as Assigned PCP    The following health maintenance items are reviewed in Epic and correct as of today:  Health Maintenance   Topic Date Due     DTAP/TDAP/TD IMMUNIZATION (1 - Tdap) 11/28/2007     ZOSTER IMMUNIZATION (2 of 3) 09/07/2012     MEDICARE ANNUAL WELLNESS VISIT  07/13/2013     ADVANCE CARE PLANNING  07/10/2022     COVID-19 Vaccine (5 - Moderna series) 03/08/2023     INFLUENZA VACCINE (1) 09/01/2023     LIPID  09/19/2023     TSH W/FREE T4 REFLEX  04/04/2024     FALL RISK ASSESSMENT  09/20/2024     PHQ-2 (once per calendar year)  Completed     Pneumococcal Vaccine: 65+ Years  Completed     IPV IMMUNIZATION  Aged Out     HPV IMMUNIZATION  Aged Out     MENINGITIS IMMUNIZATION  Aged Out     Lab work is in process  BP Readings from Last 3 Encounters:   09/20/23 130/80   09/12/23 130/80   07/19/23 138/70    Wt Readings from Last 3 Encounters:   09/20/23 74.8 kg (165 lb)   09/12/23 74.8 kg (165 lb)   07/19/23 73.2 kg (161 lb 6.4 oz)                  Patient Active Problem List   Diagnosis     ACP (advance  care planning)     Acquired hypothyroidism     Dementia with behavioral disturbance, unspecified dementia type (H)     Alcohol use disorder     Hypercholesterolemia     Chronic pain of right knee     Bright red blood per rectum     Screening for colon cancer     S/P total knee arthroplasty, right     Past Surgical History:   Procedure Laterality Date     ARTHROPLASTY KNEE Right 2022    Procedure: RIGHT TOTAL KNEE ARTHROPLASTY;  Surgeon: Brody Dobson MD;  Location: [facility]     Bilateral cataract       COLONOSCOPY N/A 2021    Procedure: Colonoscopy;  Repeat colonoscopy due to incomplete colonoscopy;  Surgeon: Silvio Guzmán MD;  Location: [facility]     ENDOSCOPY UPPER, COLONOSCOPY, COMBINED N/A 2021    Procedure: colonoscopy attempted and upper endoscopy with biopsies;  Surgeon: Silvio Guzmán MD;  Location: [facility] OR     ESOPHAGOSCOPY, GASTROSCOPY, DUODENOSCOPY (EGD), COMBINED N/A 5/10/2019    Procedure: UPPER ENDOSCOPY WITH BIOPSIES;  Surgeon: Silvio Guzmán MD;  Location: [facility]     HERNIA REPAIR Right ?     Right knee meniscectomy Right        Social History     Tobacco Use     Smoking status: Former     Packs/day: 0.50     Types: Cigarettes     Quit date: 1993     Years since quittin.3     Passive exposure: Past     Smokeless tobacco: Never   Substance Use Topics     Alcohol use: No     No family history on file.      Current Outpatient Medications   Medication Sig Dispense Refill     acetaminophen (TYLENOL) 500 MG tablet Take 1,000 mg by mouth every 6 hours as needed for mild pain or fever       ASPIRIN LOW DOSE 81 MG EC tablet TAKE 1 TABLET (81 MG) BY MOUTH DAILY 30 tablet 11     bisacodyl (DULCOLAX) 10 MG suppository Place 10 mg rectally daily as needed for constipation       cloNIDine (CATAPRES) 0.1 MG tablet        diclofenac (VOLTAREN) 1 % topical gel Apply 4 g topically 4 times daily 150 g 0     docusate sodium (COLACE) 100 MG capsule TAKE 2 CAPSULES (200 MG) BYMOUTH 2 TIMES  DAILY 112 capsule 9     levothyroxine (SYNTHROID/LEVOTHROID) 50 MCG tablet TAKE 1 TABLET (50 MCG) BY MOUTH DAILY 28 tablet 4     loperamide (IMODIUM) 2 MG capsule Take 4 mg by mouth once as needed (first loose stool) . Take 1 capsule after each subsequent loose stool. Max 16 mg/ 24 hr       meloxicam (MOBIC) 7.5 MG tablet TAKE 1 TABLET BY MOUTH ONCE DAILY WITH FOOD 30 tablet 11     pantoprazole (PROTONIX) 40 MG EC tablet TAKE 1 TABLET (40 MG) BY MOUTH DAILY 28 tablet 10     QUEtiapine (SEROQUEL) 25 MG tablet Take 25 mg by mouth 2 times daily as needed (agitation)       QUEtiapine (SEROQUEL) 300 MG tablet Take 900 mg by mouth At Bedtime       simvastatin (ZOCOR) 40 MG tablet TAKE ONE TABLET BY MOUTH AT BEDTIME. 28 tablet 4     vitamin D3 25 mcg (1000 units) tablet TAKE 1 TABLET (25 MCG) BY MOUTH DAILY 28 tablet 9     alum & mag hydroxide-simethicone (MAALOX) 200-200-20 MG/5ML SUSP suspension Take 30 mLs by mouth every 4 hours as needed (Patient not taking: Reported on 7/19/2023)       hypromellose (ARTIFICIAL TEARS) 0.5 % SOLN ophthalmic solution Place 1 drop into both eyes 4 times daily as needed for dry eyes (Patient not taking: Reported on 7/19/2023)       magnesium hydroxide (MILK OF MAGNESIA) 400 MG/5ML suspension Take 30 mLs by mouth daily as needed for constipation or heartburn  (Patient not taking: Reported on 1/4/2023)       Sodium Phosphates (ENEMA READY-TO-USE) 7-19 GM/118ML ENEM Place 1 enema rectally daily as needed (Patient not taking: Reported on 9/19/2022)       Vitamins A & D (VITAMIN A & D) external ointment Apply topically as needed (dry, reddened areas and/or rash) (Patient not taking: Reported on 7/19/2023)       Allergies   Allergen Reactions     Nickel      Possible. Cheap metal.     Penicillins      Seasonal Allergies      Perfumes             Review of Systems   Constitutional:  Negative for chills and fever.   HENT:  Negative for congestion, ear pain, hearing loss and sore throat.    Eyes:   "Negative for pain and visual disturbance.   Respiratory:  Negative for cough and shortness of breath.    Cardiovascular:  Negative for chest pain, palpitations and peripheral edema.   Gastrointestinal:  Negative for abdominal pain, constipation, diarrhea, heartburn, hematochezia and nausea.   Genitourinary:  Negative for dysuria, frequency, genital sores, hematuria, impotence, penile discharge and urgency.   Musculoskeletal:  Negative for arthralgias, joint swelling and myalgias.   Skin:  Negative for rash.   Neurological:  Negative for dizziness, weakness, headaches and paresthesias.   Psychiatric/Behavioral:  Negative for mood changes. The patient is not nervous/anxious.          OBJECTIVE:   /80 (BP Location: Right arm, Patient Position: Sitting, Cuff Size: Adult Regular)   Pulse 71   Temp 97.5  F (36.4  C) (Tympanic)   Ht 1.803 m (5' 11\")   Wt 74.8 kg (165 lb)   SpO2 96%   BMI 23.01 kg/m   Estimated body mass index is 23.01 kg/m  as calculated from the following:    Height as of this encounter: 1.803 m (5' 11\").    Weight as of this encounter: 74.8 kg (165 lb).  Physical Exam  GENERAL: alert and no distress  EYES: Eyes grossly normal to inspection, PERRL and conjunctivae and sclerae normal  HENT: ear canals and TM's normal, nose and mouth without ulcers or lesions  NECK: no adenopathy, no asymmetry, masses, or scars and thyroid normal to palpation  RESP: lungs clear to auscultation - no rales, rhonchi or wheezes  CV: regular rate and rhythm, normal S1 S2, no S3 or S4, no murmur, click or rub, no peripheral edema and peripheral pulses strong  ABDOMEN: soft, nontender, no hepatosplenomegaly, no masses and bowel sounds normal  MS: no gross musculoskeletal defects noted, no edema  SKIN: no suspicious lesions or rashes  NEURO: Normal strength and tone, sensory exam grossly normal, and able to answer question   PSYCH: mentation appears normal and affect normal/bright  LYMPH: no cervical, supraclavicular, " axillary, or inguinal adenopathy    Diagnostic Test Results:  Labs reviewed in Epic  Results for orders placed or performed in visit on 09/20/23 (from the past 24 hour(s))   Lipid Profile (Chol, Trig, HDL, LDL calc)   Result Value Ref Range    Cholesterol 113 <200 mg/dL    Triglycerides 65 <150 mg/dL    Direct Measure HDL 46 >=40 mg/dL    LDL Cholesterol Calculated 54 <=100 mg/dL    Non HDL Cholesterol 67 <130 mg/dL    Narrative    Cholesterol  Desirable:  <200 mg/dL    Triglycerides  Normal:  Less than 150 mg/dL  Borderline High:  150-199 mg/dL  High:  200-499 mg/dL  Very High:  Greater than or equal to 500 mg/dL    Direct Measure HDL  Female:  Greater than or equal to 50 mg/dL   Male:  Greater than or equal to 40 mg/dL    LDL Cholesterol  Desirable:  <100mg/dL  Above Desirable:  100-129 mg/dL   Borderline High:  130-159 mg/dL   High:  160-189 mg/dL   Very High:  >= 190 mg/dL    Non HDL Cholesterol  Desirable:  130 mg/dL  Above Desirable:  130-159 mg/dL  Borderline High:  160-189 mg/dL  High:  190-219 mg/dL  Very High:  Greater than or equal to 220 mg/dL   TSH with free T4 reflex   Result Value Ref Range    TSH 1.54 0.30 - 4.20 uIU/mL   Comprehensive metabolic panel (BMP + Alb, Alk Phos, ALT, AST, Total. Bili, TP)   Result Value Ref Range    Sodium 143 136 - 145 mmol/L    Potassium 4.3 3.4 - 5.3 mmol/L    Chloride 107 98 - 107 mmol/L    Carbon Dioxide (CO2) 26 22 - 29 mmol/L    Anion Gap 10 7 - 15 mmol/L    Urea Nitrogen 16.9 8.0 - 23.0 mg/dL    Creatinine 1.16 0.67 - 1.17 mg/dL    Calcium 8.6 (L) 8.8 - 10.2 mg/dL    Glucose 98 70 - 99 mg/dL    Alkaline Phosphatase 86 40 - 129 U/L    AST 18 0 - 45 U/L    ALT 9 0 - 70 U/L    Protein Total 6.5 6.4 - 8.3 g/dL    Albumin 4.0 3.5 - 5.2 g/dL    Bilirubin Total 0.3 <=1.2 mg/dL    GFR Estimate 64 >60 mL/min/1.73m2       ASSESSMENT / PLAN:   (Z00.00) Encounter for Medicare annual wellness exam  (primary encounter diagnosis)  Comment: continue yearly medicare visits        (F03.918) Dementia with behavioral disturbance (H)  Comment: continue current treatment per psychiatry   Plan: Comprehensive metabolic panel (BMP + Alb, Alk         Phos, ALT, AST, Total. Bili, TP)            (E03.9) Acquired hypothyroidism  Comment: reviewed labs TSH within normal level  continue current medication   Plan: TSH with free T4 reflex            (E78.5) Hyperlipidemia LDL goal <100  Comment: cholesterol level look great - continue current treatment  Plan: Lipid Profile (Chol, Trig, HDL, LDL calc)              COUNSELING:  Reviewed preventive health counseling, as reflected in patient instructions  Special attention given to:       Regular exercise       Healthy diet/nutrition        He reports that he quit smoking about 30 years ago. His smoking use included cigarettes. He smoked an average of .5 packs per day. He has been exposed to tobacco smoke. He has never used smokeless tobacco.      Appropriate preventive services were discussed with this patient, including applicable screening as appropriate for cardiovascular disease, diabetes, osteopenia/osteoporosis, and glaucoma.  As appropriate for age/gender, discussed screening for colorectal cancer, prostate cancer, breast cancer, and cervical cancer. Checklist reviewing preventive services available has been given to the patient.    Reviewed patients plan of care and provided an AVS. The Basic Care Plan (routine screening as documented in Health Maintenance) for Christiano meets the Care Plan requirement. This Care Plan has been established and reviewed with the Patient.          ZANE Collins [facility] [place]    Identified Health Risks:

## 2023-10-02 DIAGNOSIS — E03.9 HYPOTHYROIDISM, UNSPECIFIED TYPE: ICD-10-CM

## 2023-10-02 DIAGNOSIS — E78.5 HYPERLIPIDEMIA LDL GOAL <100: ICD-10-CM

## 2023-10-02 DIAGNOSIS — K59.00 CONSTIPATION, UNSPECIFIED CONSTIPATION TYPE: ICD-10-CM

## 2023-10-02 DIAGNOSIS — K21.00 GASTROESOPHAGEAL REFLUX DISEASE WITH ESOPHAGITIS WITHOUT HEMORRHAGE: ICD-10-CM

## 2023-10-02 DIAGNOSIS — F03.918 DEMENTIA WITH BEHAVIORAL DISTURBANCE (H): Primary | ICD-10-CM

## 2023-10-02 DIAGNOSIS — E55.9 VITAMIN D DEFICIENCY: ICD-10-CM

## 2023-10-03 RX ORDER — DOCUSATE SODIUM 100 MG/1
CAPSULE, LIQUID FILLED ORAL
Qty: 112 CAPSULE | Refills: 10 | Status: SHIPPED | OUTPATIENT
Start: 2023-10-03 | End: 2023-10-04

## 2023-10-03 RX ORDER — VITAMIN B COMPLEX
TABLET ORAL
Qty: 28 TABLET | Refills: 10 | Status: SHIPPED | OUTPATIENT
Start: 2023-10-03 | End: 2023-10-04

## 2023-10-03 RX ORDER — PANTOPRAZOLE SODIUM 40 MG/1
40 TABLET, DELAYED RELEASE ORAL DAILY
Qty: 28 TABLET | Refills: 10 | Status: SHIPPED | OUTPATIENT
Start: 2023-10-03 | End: 2023-10-04

## 2023-10-03 RX ORDER — SIMVASTATIN 40 MG
TABLET ORAL
Qty: 28 TABLET | Refills: 10 | Status: SHIPPED | OUTPATIENT
Start: 2023-10-03 | End: 2023-10-04

## 2023-10-03 RX ORDER — LEVOTHYROXINE SODIUM 50 UG/1
50 TABLET ORAL DAILY
Qty: 28 TABLET | Refills: 10 | Status: SHIPPED | OUTPATIENT
Start: 2023-10-03 | End: 2023-10-04

## 2023-10-03 RX ORDER — QUETIAPINE FUMARATE 300 MG/1
900 TABLET, FILM COATED ORAL AT BEDTIME
Qty: 90 TABLET | Refills: 4 | Status: SHIPPED | OUTPATIENT
Start: 2023-10-03 | End: 2023-10-04

## 2023-10-04 RX ORDER — PANTOPRAZOLE SODIUM 40 MG/1
40 TABLET, DELAYED RELEASE ORAL DAILY
Qty: 28 TABLET | Refills: 10 | Status: SHIPPED | OUTPATIENT
Start: 2023-10-04 | End: 2024-08-15

## 2023-10-04 RX ORDER — LEVOTHYROXINE SODIUM 50 UG/1
50 TABLET ORAL DAILY
Qty: 28 TABLET | Refills: 10 | Status: SHIPPED | OUTPATIENT
Start: 2023-10-04 | End: 2024-08-15

## 2023-10-04 RX ORDER — SIMVASTATIN 40 MG
TABLET ORAL
Qty: 28 TABLET | Refills: 10 | Status: SHIPPED | OUTPATIENT
Start: 2023-10-04 | End: 2024-08-15

## 2023-10-04 RX ORDER — VITAMIN B COMPLEX
TABLET ORAL
Qty: 28 TABLET | Refills: 10 | Status: SHIPPED | OUTPATIENT
Start: 2023-10-04 | End: 2024-07-18

## 2023-10-04 RX ORDER — QUETIAPINE FUMARATE 300 MG/1
900 TABLET, FILM COATED ORAL AT BEDTIME
Qty: 90 TABLET | Refills: 4 | Status: SHIPPED | OUTPATIENT
Start: 2023-10-04 | End: 2024-01-16

## 2023-10-04 RX ORDER — DOCUSATE SODIUM 100 MG/1
CAPSULE, LIQUID FILLED ORAL
Qty: 112 CAPSULE | Refills: 10 | Status: SHIPPED | OUTPATIENT
Start: 2023-10-04 | End: 2024-07-18

## 2023-11-09 ENCOUNTER — LAB (OUTPATIENT)
Dept: LAB | Facility: OTHER | Age: 78
End: 2023-11-09
Payer: MEDICARE

## 2023-11-09 DIAGNOSIS — Z79.899 DRUG THERAPY: Primary | ICD-10-CM

## 2023-11-09 DIAGNOSIS — Z79.899 DRUG THERAPY: ICD-10-CM

## 2023-11-09 LAB
ALBUMIN SERPL BCG-MCNC: 3.8 G/DL (ref 3.5–5.2)
ALP SERPL-CCNC: 79 U/L (ref 40–129)
ALT SERPL W P-5'-P-CCNC: 6 U/L (ref 0–70)
ANION GAP SERPL CALCULATED.3IONS-SCNC: 9 MMOL/L (ref 7–15)
AST SERPL W P-5'-P-CCNC: 14 U/L (ref 0–45)
BASOPHILS # BLD AUTO: 0 10E3/UL (ref 0–0.2)
BASOPHILS NFR BLD AUTO: 0 %
BILIRUB SERPL-MCNC: 0.2 MG/DL
BUN SERPL-MCNC: 24 MG/DL (ref 8–23)
CALCIUM SERPL-MCNC: 8.9 MG/DL (ref 8.8–10.2)
CHLORIDE SERPL-SCNC: 108 MMOL/L (ref 98–107)
CHOLEST SERPL-MCNC: 138 MG/DL
CREAT SERPL-MCNC: 1.21 MG/DL (ref 0.67–1.17)
DEPRECATED HCO3 PLAS-SCNC: 26 MMOL/L (ref 22–29)
EGFRCR SERPLBLD CKD-EPI 2021: 61 ML/MIN/1.73M2
EOSINOPHIL # BLD AUTO: 0.2 10E3/UL (ref 0–0.7)
EOSINOPHIL NFR BLD AUTO: 2 %
ERYTHROCYTE [DISTWIDTH] IN BLOOD BY AUTOMATED COUNT: 14.3 % (ref 10–15)
EST. AVERAGE GLUCOSE BLD GHB EST-MCNC: 114 MG/DL
GLUCOSE SERPL-MCNC: 112 MG/DL (ref 70–99)
HBA1C MFR BLD: 5.6 %
HCT VFR BLD AUTO: 41.2 % (ref 40–53)
HDLC SERPL-MCNC: 46 MG/DL
HGB BLD-MCNC: 13.3 G/DL (ref 13.3–17.7)
IMM GRANULOCYTES # BLD: 0 10E3/UL
IMM GRANULOCYTES NFR BLD: 1 %
LDLC SERPL CALC-MCNC: 72 MG/DL
LYMPHOCYTES # BLD AUTO: 1.6 10E3/UL (ref 0.8–5.3)
LYMPHOCYTES NFR BLD AUTO: 23 %
MCH RBC QN AUTO: 29.7 PG (ref 26.5–33)
MCHC RBC AUTO-ENTMCNC: 32.3 G/DL (ref 31.5–36.5)
MCV RBC AUTO: 92 FL (ref 78–100)
MONOCYTES # BLD AUTO: 0.7 10E3/UL (ref 0–1.3)
MONOCYTES NFR BLD AUTO: 9 %
NEUTROPHILS # BLD AUTO: 4.5 10E3/UL (ref 1.6–8.3)
NEUTROPHILS NFR BLD AUTO: 65 %
NONHDLC SERPL-MCNC: 92 MG/DL
NRBC # BLD AUTO: 0 10E3/UL
NRBC BLD AUTO-RTO: 0 /100
PLATELET # BLD AUTO: 164 10E3/UL (ref 150–450)
POTASSIUM SERPL-SCNC: 3.9 MMOL/L (ref 3.4–5.3)
PROT SERPL-MCNC: 6.8 G/DL (ref 6.4–8.3)
RBC # BLD AUTO: 4.48 10E6/UL (ref 4.4–5.9)
SODIUM SERPL-SCNC: 143 MMOL/L (ref 135–145)
TRIGL SERPL-MCNC: 100 MG/DL
WBC # BLD AUTO: 7 10E3/UL (ref 4–11)

## 2023-11-09 PROCEDURE — 82310 ASSAY OF CALCIUM: CPT | Mod: ZL

## 2023-11-09 PROCEDURE — 85004 AUTOMATED DIFF WBC COUNT: CPT | Mod: ZL

## 2023-11-09 PROCEDURE — 36415 COLL VENOUS BLD VENIPUNCTURE: CPT | Mod: ZL

## 2023-11-09 PROCEDURE — 83036 HEMOGLOBIN GLYCOSYLATED A1C: CPT | Mod: ZL

## 2023-11-09 PROCEDURE — 80061 LIPID PANEL: CPT | Mod: ZL

## 2023-11-09 PROCEDURE — 82374 ASSAY BLOOD CARBON DIOXIDE: CPT | Mod: ZL

## 2024-01-10 ENCOUNTER — TELEPHONE (OUTPATIENT)
Dept: FAMILY MEDICINE | Facility: OTHER | Age: 79
End: 2024-01-10

## 2024-01-10 DIAGNOSIS — F03.918 DEMENTIA WITH BEHAVIORAL DISTURBANCE (H): ICD-10-CM

## 2024-01-10 NOTE — TELEPHONE ENCOUNTER
RECEIVED PA REQUEST FROM [facility] FOR QUEtiapine (SEROQUEL) 300 MG tablet . SUBMITTED ON CMM. WAITING FOR RESPONSE.

## 2024-01-16 RX ORDER — QUETIAPINE FUMARATE 300 MG/1
900 TABLET, FILM COATED ORAL AT BEDTIME
Qty: 90 TABLET | Refills: 4 | Status: SHIPPED | OUTPATIENT
Start: 2024-01-16

## 2024-01-16 NOTE — TELEPHONE ENCOUNTER
QUEtiapine (SEROQUEL) 300 MG tablet 90 tablet 4 10/4/2023 -- No   Sig - Route: Take 3 tablets (900 mg) by mouth At Bedtime - Oral   Sent to pharmacy as: QUEtiapine Fumarate 300 MG Oral Tablet (SEROquel)   Class: E-Prescribe   Order: [id number]   E-Prescribing Status: Receipt confirmed by pharmacy (10/4/2023  9:03 AM CDT)       Med denied.  Quantity need's to be 2 per day for insurance to cover.

## 2024-01-16 NOTE — TELEPHONE ENCOUNTER
Received a DENIAL from [facility] for QUEtiapine (SEROQUEL) 300 MG tablet.          Scanned in Epic.

## 2024-01-17 ENCOUNTER — MEDICAL CORRESPONDENCE (OUTPATIENT)
Dept: HEALTH INFORMATION MANAGEMENT | Facility: HOSPITAL | Age: 79
End: 2024-01-17

## 2024-01-17 DIAGNOSIS — Z00.00 HEALTHCARE MAINTENANCE: ICD-10-CM

## 2024-01-17 RX ORDER — ASPIRIN 81 MG/1
81 TABLET ORAL DAILY
Qty: 30 TABLET | Refills: 11 | Status: SHIPPED | OUTPATIENT
Start: 2024-01-17

## 2024-01-24 ENCOUNTER — TELEPHONE (OUTPATIENT)
Dept: FAMILY MEDICINE | Facility: OTHER | Age: 79
End: 2024-01-24

## 2024-01-24 NOTE — TELEPHONE ENCOUNTER
The number to call back was answered by Raj nurse.  Nurse states he need's teeth pulled and everything is taken care of for this.

## 2024-01-24 NOTE — TELEPHONE ENCOUNTER
To: Genny Dang team    Please call patient as he is in need dental surgery.  Asking if you could refer him to someone.  He can be reached @ [phone]     Thank you

## 2024-02-12 NOTE — TELEPHONE ENCOUNTER
TO: Genny Dang    Please see the below information.  Patient never did get a call regarding his teeth. Please expedite.    Thank you

## 2024-03-25 DIAGNOSIS — M25.50 MULTIPLE JOINT PAIN: ICD-10-CM

## 2024-03-25 RX ORDER — MELOXICAM 7.5 MG/1
7.5 TABLET ORAL DAILY
Qty: 28 TABLET | Refills: 0 | Status: SHIPPED | OUTPATIENT
Start: 2024-03-25 | End: 2024-04-22

## 2024-03-25 NOTE — TELEPHONE ENCOUNTER
Mobic 7.5 mg      Last Written Prescription Date:  5/5/23  Last Fill Quantity: 30,   # refills: 11  Last Office Visit: 9/20/23  Future Office visit:       Routing refill request to provider for review/approval because:  NSAID Medications Failed      Patient is age 6-64 years      Always Fail Criteria - Chart Review Required    Validate Diagnosis. If the medication is requested for an acute flare of a chronic pain associated with a musculoskeletal or rheumatologic condition; okay to authorize if all other criteria are met. If not, then forward to provider for review.    Normal serum creatinine on file in past 12 months        Recent Labs   Lab Test 11/09/23  0905   CR 1.21*

## 2024-04-15 PROBLEM — Z78.9 LIVES IN ASSISTED LIVING FACILITY: Status: ACTIVE | Noted: 2024-02-13

## 2024-04-15 NOTE — PROGRESS NOTES
Assessment & Plan     Elevated blood pressure reading without diagnosis of hypertension  Blood pressure stable in clinic today.  Christiano tells me he recently had a dental abscess which may make his blood pressure elevated. Other possible is new onset of kidney disease - if blood pressure remains elevated at assisted living follow up   Encourage increased water intake and healthy diet   - Comprehensive metabolic panel (BMP + Alb, Alk Phos, ALT, AST, Total. Bili, TP); Future  - Comprehensive metabolic panel (BMP + Alb, Alk Phos, ALT, AST, Total. Bili, TP)    Ordering of each unique test        See Patient Instructions    No follow-ups on file.    Subjective   Christiano is a 79 year old, presenting for the following health issues:  Recheck Medication        4/16/2024     9:55 AM   Additional Questions   Roomed by Derrell Gutierrez   Accompanied by None         4/16/2024     9:55 AM   Patient Reported Additional Medications   Patient reports taking the following new medications None     HPI     Hyperlipidemia Follow-Up    Are you regularly taking any medication or supplement to lower your cholesterol?   Yes- Zocor 40 mg  Are you having muscle aches or other side effects that you think could be caused by your cholesterol lowering medication?  No    Hypertension Follow-up  Message from assisted living.  Recently blood pressure are increasing.  With highest blood pressure 154/101  Blood pressure stable here in clinic   Do you check your blood pressure regularly outside of the clinic? Yes   Are you following a low salt diet? Yes  Are your blood pressures ever more than 140 on the top number (systolic) OR more   than 90 on the bottom number (diastolic), for example 140/90? Patient states that he does not know     Hypothyroidism Follow-up    Since last visit, patient describes the following symptoms: Weight stable, no hair loss, no skin changes, no constipation, no loose stools          Review of Systems  CONSTITUTIONAL: NEGATIVE for  fever, chills, change in weight  RESP: NEGATIVE for significant cough or SOB  CV: NEGATIVE for chest pain, palpitations or peripheral edema  GI: NEGATIVE for nausea, abdominal pain, heartburn, or change in bowel habits  : negative for dysuria, hematuria, decreased urinary stream      Objective    /82 (BP Location: Left arm, Patient Position: Sitting, Cuff Size: Adult Regular)   Pulse 76   Temp (!) 96.4  F (35.8  C) (Tympanic)   Resp 16   Wt 75.8 kg (167 lb)   SpO2 97%   BMI 23.29 kg/m    Body mass index is 23.29 kg/m .  Physical Exam   GENERAL: alert, no distress, and elderly  RESP: lungs clear to auscultation - no rales, rhonchi or wheezes  CV: regular rate and rhythm, normal S1 S2, no S3 or S4, no murmur, click or rub, no peripheral edema  ABDOMEN: soft, nontender, no hepatosplenomegaly, no masses and bowel sounds normal    Results for orders placed or performed in visit on 04/16/24   Comprehensive metabolic panel (BMP + Alb, Alk Phos, ALT, AST, Total. Bili, TP)     Status: Abnormal   Result Value Ref Range    Sodium 140 135 - 145 mmol/L    Potassium 4.2 3.4 - 5.3 mmol/L    Carbon Dioxide (CO2) 26 22 - 29 mmol/L    Anion Gap 8 7 - 15 mmol/L    Urea Nitrogen 20.1 8.0 - 23.0 mg/dL    Creatinine 1.25 (H) 0.67 - 1.17 mg/dL    GFR Estimate 58 (L) >60 mL/min/1.73m2    Calcium 8.7 (L) 8.8 - 10.2 mg/dL    Chloride 106 98 - 107 mmol/L    Glucose 125 (H) 70 - 99 mg/dL    Alkaline Phosphatase 88 40 - 150 U/L    AST 17 0 - 45 U/L    ALT 7 0 - 70 U/L    Protein Total 6.7 6.4 - 8.3 g/dL    Albumin 3.9 3.5 - 5.2 g/dL    Bilirubin Total 0.4 <=1.2 mg/dL           Signed Electronically by: ZANE Collins CNP

## 2024-04-16 ENCOUNTER — MEDICAL CORRESPONDENCE (OUTPATIENT)
Dept: HEALTH INFORMATION MANAGEMENT | Facility: CLINIC | Age: 79
End: 2024-04-16

## 2024-04-16 ENCOUNTER — OFFICE VISIT (OUTPATIENT)
Dept: FAMILY MEDICINE | Facility: OTHER | Age: 79
End: 2024-04-16
Attending: NURSE PRACTITIONER
Payer: MEDICARE

## 2024-04-16 VITALS
BODY MASS INDEX: 23.29 KG/M2 | WEIGHT: 167 LBS | SYSTOLIC BLOOD PRESSURE: 125 MMHG | TEMPERATURE: 96.4 F | DIASTOLIC BLOOD PRESSURE: 82 MMHG | RESPIRATION RATE: 16 BRPM | OXYGEN SATURATION: 97 % | HEART RATE: 76 BPM

## 2024-04-16 DIAGNOSIS — R03.0 ELEVATED BLOOD PRESSURE READING WITHOUT DIAGNOSIS OF HYPERTENSION: Primary | ICD-10-CM

## 2024-04-16 LAB
ALBUMIN SERPL BCG-MCNC: 3.9 G/DL (ref 3.5–5.2)
ALP SERPL-CCNC: 88 U/L (ref 40–150)
ALT SERPL W P-5'-P-CCNC: 7 U/L (ref 0–70)
ANION GAP SERPL CALCULATED.3IONS-SCNC: 8 MMOL/L (ref 7–15)
AST SERPL W P-5'-P-CCNC: 17 U/L (ref 0–45)
BILIRUB SERPL-MCNC: 0.4 MG/DL
BUN SERPL-MCNC: 20.1 MG/DL (ref 8–23)
CALCIUM SERPL-MCNC: 8.7 MG/DL (ref 8.8–10.2)
CHLORIDE SERPL-SCNC: 106 MMOL/L (ref 98–107)
CREAT SERPL-MCNC: 1.25 MG/DL (ref 0.67–1.17)
DEPRECATED HCO3 PLAS-SCNC: 26 MMOL/L (ref 22–29)
EGFRCR SERPLBLD CKD-EPI 2021: 58 ML/MIN/1.73M2
GLUCOSE SERPL-MCNC: 125 MG/DL (ref 70–99)
POTASSIUM SERPL-SCNC: 4.2 MMOL/L (ref 3.4–5.3)
PROT SERPL-MCNC: 6.7 G/DL (ref 6.4–8.3)
SODIUM SERPL-SCNC: 140 MMOL/L (ref 135–145)

## 2024-04-16 PROCEDURE — 36415 COLL VENOUS BLD VENIPUNCTURE: CPT | Mod: ZL | Performed by: NURSE PRACTITIONER

## 2024-04-16 PROCEDURE — 82247 BILIRUBIN TOTAL: CPT | Mod: ZL | Performed by: NURSE PRACTITIONER

## 2024-04-16 PROCEDURE — G0463 HOSPITAL OUTPT CLINIC VISIT: HCPCS

## 2024-04-16 PROCEDURE — 99213 OFFICE O/P EST LOW 20 MIN: CPT | Performed by: NURSE PRACTITIONER

## 2024-04-16 ASSESSMENT — PAIN SCALES - GENERAL: PAINLEVEL: NO PAIN (0)

## 2024-04-16 NOTE — PATIENT INSTRUCTIONS
Christiano states he recently had dental work,maybe reason for elevated blood pressure at assisted living      Blood pressure back to normal no change in medication  Will check lab and notify once available

## 2024-04-17 ENCOUNTER — TELEPHONE (OUTPATIENT)
Dept: FAMILY MEDICINE | Facility: OTHER | Age: 79
End: 2024-04-17

## 2024-04-17 NOTE — TELEPHONE ENCOUNTER
----- Message from ZANE Navarrete CNP sent at 4/16/2024 10:55 AM CDT -----  Please notify   Kidney function is slightly abnormal.  Encouraged water intake a minimum of 8 - 8 ounces glasses per day.  Also encourage a healthy diet - calcium slightly low    Genny DEGROOT FN-BC  Family Nurse Practitioner

## 2024-04-22 DIAGNOSIS — M25.50 MULTIPLE JOINT PAIN: ICD-10-CM

## 2024-04-22 RX ORDER — MELOXICAM 7.5 MG/1
7.5 TABLET ORAL DAILY
Qty: 28 TABLET | Refills: 0 | Status: SHIPPED | OUTPATIENT
Start: 2024-04-22 | End: 2024-05-20

## 2024-04-22 NOTE — TELEPHONE ENCOUNTER
Mobic  Last Written Prescription Date: 3/25/24  Last Fill Quantity: 28 # of Refills: 0  Last Office Visit: 4/16/24

## 2024-04-29 NOTE — PROGRESS NOTES
Assessment & Plan     Bilateral foot pain  No fracture noted on imaging to bilateral feet - follow up if no improvement or worsening pain   Continue with walking as able   Ok to soak feet 1-2 times a day as needed for comfort   - XR FOOT RT G/E 3 VW (Clinic Performed); Future  - XR FOOT LT G/E 3 VW (Clinic Performed); Future    Ordering of each unique test      See Patient Instructions    No follow-ups on file.    Geetha Alvarado is a 79 year old, presenting for the following health issues:  Knee Pain (Right knee)        4/30/2024     2:22 PM   Additional Questions   Roomed by BRYAN Moreno CMA   Accompanied by Self         4/30/2024     2:22 PM   Patient Reported Additional Medications   Patient reports taking the following new medications None     HPI     Pain History:  When did you first notice your pain? chronic   Have you seen this provider for your pain in the past? Yes   Where in your body do you have pain? Right knee  Are you seeing anyone else for your pain? Yes - had TKA 4/2022 4/29/2021     2:00 PM 12/9/2021     2:00 PM 9/20/2023    12:53 PM   PHQ-9 SCORE   PHQ-9 Total Score 0 0 1           4/29/2021     2:00 PM 12/9/2021     2:00 PM 9/20/2023    12:53 PM   MIGUEL-7 SCORE   Total Score 0 0 0               Chronic Pain Follow Up:    Location of pain: bilateral feet and toes   Analgesia/pain control:    - Recent changes:  states run over by wheelchair     - Overall control: Tolerable with discomfort    - Current treatments: warm water    Adherence:     - Do you ever take more pain medicine than prescribed? No    - When did you take your last dose of pain medicine?  NA   Adverse effects: No   PDMP Review         Value Time User    State PDMP site checked  Yes 4/16/2024  7:33 AM Genny Dang APRN CNP          Last CSA Agreement:   CSA -- Patient Level:    CSA: None found at the patient level.       Last UDS:                 Review of Systems  CONSTITUTIONAL: NEGATIVE for fever, chills,  change in weight  INTEGUMENTARY/SKIN: right foot base of great toe scab, some brusing into the toes both feet   RESP: NEGATIVE for significant cough or SOB  CV: NEGATIVE for chest pain, palpitations or peripheral edema  MUSCULOSKELETAL: pain bilateral feet and toes   NEURO: NEGATIVE for weakness, dizziness or paresthesias      Objective    /70   Pulse 80   Temp 97  F (36.1  C) (Tympanic)   Wt 74.4 kg (164 lb)   SpO2 95%   BMI 22.87 kg/m    Body mass index is 22.87 kg/m .  Physical Exam   GENERAL: alert and no distress  MS: tenderness to palpation tops of both feet into the toes   SKIN: dry callused feet, scab into lateral foot on the right near base of great toe, some discoloration to 2nd toe left foot  NEURO: ambulating without difficulty or a limp     Results for orders placed or performed in visit on 04/30/24   XR FOOT LT G/E 3 VW (Clinic Performed)     Status: None    Narrative    Exam: XR FOOT LEFT G/E 3 VIEWS     History:Male, age 79 years, pain ball of the foot into the toes;  Bilateral foot pain; Bilateral foot pain    Comparison:  No relevant prior imaging.    Technique: Three views are submitted.    Findings: Bones are normally mineralized. No evidence of acute or  subacute fracture.  No evidence of dislocation.           Impression    Impression:  No evidence of acute or subacute bony abnormality.     Moderately severe to severe degenerative changes at the first  metatarsophalangeal joint.    BRYANT PHILLIP MD         SYSTEM ID:  [id number]   Results for orders placed or performed in visit on 04/30/24   XR FOOT RT G/E 3 VW (Clinic Performed)     Status: None    Narrative    Exam: XR FOOT RIGHT G/E 3 VIEWS     History:Male, age 79 years, pain ball of the foot into the toes;  Bilateral foot pain; Bilateral foot pain    Comparison:  No relevant prior imaging.    Technique: Three views are submitted.    Findings: Bones are normally mineralized. No evidence of acute or  subacute fracture.  No  evidence of dislocation.           Impression    Impression:  No evidence of acute or subacute bony abnormality.     Moderately severe to severe degenerative changes at the first  metatarsophalangeal joint.    BRYANT PHILLIP MD         SYSTEM ID:  [id number]           Signed Electronically by: ZANE Collins CNP

## 2024-04-30 ENCOUNTER — ANCILLARY PROCEDURE (OUTPATIENT)
Dept: GENERAL RADIOLOGY | Facility: OTHER | Age: 79
End: 2024-04-30
Attending: NURSE PRACTITIONER
Payer: MEDICARE

## 2024-04-30 ENCOUNTER — HOSPITAL ENCOUNTER (EMERGENCY)
Facility: HOSPITAL | Age: 79
Discharge: HOME | End: 2024-05-01
Attending: NURSE PRACTITIONER | Admitting: NURSE PRACTITIONER
Payer: MEDICARE

## 2024-04-30 ENCOUNTER — OFFICE VISIT (OUTPATIENT)
Dept: FAMILY MEDICINE | Facility: OTHER | Age: 79
End: 2024-04-30
Attending: NURSE PRACTITIONER
Payer: MEDICARE

## 2024-04-30 VITALS
DIASTOLIC BLOOD PRESSURE: 98 MMHG | HEART RATE: 86 BPM | SYSTOLIC BLOOD PRESSURE: 162 MMHG | OXYGEN SATURATION: 95 % | RESPIRATION RATE: 16 BRPM | TEMPERATURE: 97 F

## 2024-04-30 VITALS
DIASTOLIC BLOOD PRESSURE: 70 MMHG | OXYGEN SATURATION: 95 % | TEMPERATURE: 97 F | SYSTOLIC BLOOD PRESSURE: 120 MMHG | BODY MASS INDEX: 22.87 KG/M2 | HEART RATE: 80 BPM | WEIGHT: 164 LBS

## 2024-04-30 DIAGNOSIS — M79.672 BILATERAL FOOT PAIN: Primary | ICD-10-CM

## 2024-04-30 DIAGNOSIS — M79.671 BILATERAL FOOT PAIN: ICD-10-CM

## 2024-04-30 DIAGNOSIS — M79.672 BILATERAL FOOT PAIN: ICD-10-CM

## 2024-04-30 DIAGNOSIS — F41.8 SITUATIONAL ANXIETY: ICD-10-CM

## 2024-04-30 DIAGNOSIS — M79.671 BILATERAL FOOT PAIN: Primary | ICD-10-CM

## 2024-04-30 PROBLEM — F32.A DEPRESSION: Status: ACTIVE | Noted: 2024-04-30

## 2024-04-30 PROBLEM — F03.918 DEMENTIA WITH BEHAVIORAL DISTURBANCE (H): Status: ACTIVE | Noted: 2017-07-10

## 2024-04-30 PROCEDURE — 99283 EMERGENCY DEPT VISIT LOW MDM: CPT | Mod: 25 | Performed by: NURSE PRACTITIONER

## 2024-04-30 PROCEDURE — G0463 HOSPITAL OUTPT CLINIC VISIT: HCPCS

## 2024-04-30 PROCEDURE — 99285 EMERGENCY DEPT VISIT HI MDM: CPT

## 2024-04-30 PROCEDURE — 73630 X-RAY EXAM OF FOOT: CPT | Mod: TC,RT

## 2024-04-30 PROCEDURE — 99213 OFFICE O/P EST LOW 20 MIN: CPT | Performed by: NURSE PRACTITIONER

## 2024-04-30 PROCEDURE — 250N000013 HC RX MED GY IP 250 OP 250 PS 637: Performed by: NURSE PRACTITIONER

## 2024-04-30 PROCEDURE — 73630 X-RAY EXAM OF FOOT: CPT | Mod: TC,LT

## 2024-04-30 RX ORDER — LEVOTHYROXINE SODIUM 50 UG/1
50 TABLET ORAL DAILY
Status: DISCONTINUED | OUTPATIENT
Start: 2024-04-30 | End: 2024-04-30

## 2024-04-30 RX ORDER — PANTOPRAZOLE SODIUM 40 MG/1
40 TABLET, DELAYED RELEASE ORAL DAILY
Status: DISCONTINUED | OUTPATIENT
Start: 2024-05-01 | End: 2024-05-01 | Stop reason: HOSPADM

## 2024-04-30 RX ORDER — ASPIRIN 81 MG/1
81 TABLET ORAL DAILY
Status: DISCONTINUED | OUTPATIENT
Start: 2024-04-30 | End: 2024-04-30

## 2024-04-30 RX ORDER — SIMVASTATIN 20 MG
40 TABLET ORAL AT BEDTIME
Status: DISCONTINUED | OUTPATIENT
Start: 2024-04-30 | End: 2024-05-01 | Stop reason: HOSPADM

## 2024-04-30 RX ORDER — MELOXICAM 7.5 MG/1
7.5 TABLET ORAL DAILY
Status: DISCONTINUED | OUTPATIENT
Start: 2024-04-30 | End: 2024-04-30

## 2024-04-30 RX ORDER — PANTOPRAZOLE SODIUM 40 MG/1
40 TABLET, DELAYED RELEASE ORAL DAILY
Status: DISCONTINUED | OUTPATIENT
Start: 2024-04-30 | End: 2024-04-30

## 2024-04-30 RX ORDER — ASPIRIN 81 MG/1
81 TABLET ORAL DAILY
Status: DISCONTINUED | OUTPATIENT
Start: 2024-05-01 | End: 2024-05-01 | Stop reason: HOSPADM

## 2024-04-30 RX ORDER — CLONIDINE HYDROCHLORIDE 0.1 MG/1
0.1 TABLET ORAL 2 TIMES DAILY
Status: DISCONTINUED | OUTPATIENT
Start: 2024-04-30 | End: 2024-05-01 | Stop reason: HOSPADM

## 2024-04-30 RX ORDER — QUETIAPINE FUMARATE 300 MG/1
900 TABLET, FILM COATED ORAL AT BEDTIME
Status: DISCONTINUED | OUTPATIENT
Start: 2024-04-30 | End: 2024-05-01 | Stop reason: HOSPADM

## 2024-04-30 RX ORDER — LEVOTHYROXINE SODIUM 50 UG/1
50 TABLET ORAL DAILY
Status: DISCONTINUED | OUTPATIENT
Start: 2024-05-01 | End: 2024-05-01 | Stop reason: HOSPADM

## 2024-04-30 RX ORDER — DOCUSATE SODIUM 100 MG/1
200 CAPSULE, LIQUID FILLED ORAL 2 TIMES DAILY
Status: DISCONTINUED | OUTPATIENT
Start: 2024-04-30 | End: 2024-05-01 | Stop reason: HOSPADM

## 2024-04-30 RX ORDER — MELOXICAM 7.5 MG/1
7.5 TABLET ORAL DAILY
Status: DISCONTINUED | OUTPATIENT
Start: 2024-05-01 | End: 2024-05-01 | Stop reason: HOSPADM

## 2024-04-30 RX ADMIN — CLONIDINE HYDROCHLORIDE 0.1 MG: 0.1 TABLET ORAL at 21:05

## 2024-04-30 RX ADMIN — DOCUSATE SODIUM 200 MG: 100 CAPSULE, LIQUID FILLED ORAL at 21:05

## 2024-04-30 RX ADMIN — QUETIAPINE FUMARATE 900 MG: 300 TABLET ORAL at 21:06

## 2024-04-30 RX ADMIN — SIMVASTATIN 40 MG: 20 TABLET, FILM COATED ORAL at 21:05

## 2024-04-30 ASSESSMENT — ACTIVITIES OF DAILY LIVING (ADL)
ADLS_ACUITY_SCORE: 38

## 2024-04-30 ASSESSMENT — ENCOUNTER SYMPTOMS
CARDIOVASCULAR NEGATIVE: 1
CONSTITUTIONAL NEGATIVE: 1
ALLERGIC/IMMUNOLOGIC NEGATIVE: 1
MUSCULOSKELETAL NEGATIVE: 1
HEMATOLOGIC/LYMPHATIC NEGATIVE: 1
GASTROINTESTINAL NEGATIVE: 1
RESPIRATORY NEGATIVE: 1
ENDOCRINE NEGATIVE: 1
EYES NEGATIVE: 1
NEUROLOGICAL NEGATIVE: 1

## 2024-04-30 ASSESSMENT — PAIN SCALES - GENERAL: PAINLEVEL: MODERATE PAIN (5)

## 2024-04-30 NOTE — PATIENT INSTRUCTIONS
No fracture or concerns on XR    For foot and toe discomfort ok to soak feet 1-2 times a day as needed, make sure to dry feet and toes afterwards    Follow up if no improvement

## 2024-04-30 NOTE — ED NOTES
Per provider, After consulting with pt's  Pt requires a psychiatric evaluation. And if pt is clear, will attend living arrangement at [facility] tomorrow. If patient is not cleared, pt will require return to [facility] or the [facility]

## 2024-04-30 NOTE — ED TRIAGE NOTES
Pt brought in by [place] police after pt was evicted from current living situation. Pt has current civil commitment paper work, has not violated according to police but has no place to go other than [facility] if he can't stay here until tomorrow where patient has an arranged living place to start 5/1/24.      Crissy is available until [phone]

## 2024-04-30 NOTE — ED PROVIDER NOTES
History     Chief Complaint   Patient presents with    Mental Health Problem     HPI  Christiano Leon is a 79 year old individual with history of dementia with behavioral disturbances, depression, alcohol use disorder, hypothyroidism, comes in via law enforcement for psychiatric evaluation.  It is difficult to understand what is going on.  Patient apparently is in need of home placement.  The current facility he is living at he got kicked out of which is revocation of his commitment.  Patient does have new facility that accepted him for admission on 5/1/2024.  Unfortunately patient became verbally aggressive with staff member and they kicked the patient notes.  For this reason [employer] picked the patient up.  Patient  Jovan ([phone]) states that he spoke with [facility] where patient was committed to and was advised that patient needs to come to closest ER for evaluation.  If unstable will need to go to [facility].  If stable can go to [facility] in [place], [place] tomorrow.  Patient states he became upset with staff members at current facility.  He knows he is being evicted.  Patient is happy that he will be going to another place.  No suicidal or homicidal ideation reported.    Allergies:  Allergies   Allergen Reactions    Nickel      Possible. Cheap metal.    Penicillins     Seasonal Allergies      Perfumes       Problem List:    Patient Active Problem List    Diagnosis Date Noted    Depression 04/30/2024     Priority: Medium    Lives in assisted living facility 02/13/2024     Priority: Medium    S/P total knee arthroplasty, right 04/02/2022     Priority: Medium    Screening for colon cancer 06/02/2021     Priority: Medium     Added automatically from request for surgery [id number]      Bright red blood per rectum 05/19/2021     Priority: Medium     Added automatically from request for surgery [id number]      Chronic pain of right knee 06/28/2019      Priority: Medium    Hypercholesterolemia 10/04/2018     Priority: Medium    ACP (advance care planning) 07/10/2017     Priority: Medium     Advance Care Planning 7/10/2017: ACP Review of Chart / Resources Provided:  Reviewed chart for advance care plan.  Christiano Leon has no plan or code status on file. Discussed available resources and provided with information.   Added by TERRENCE MARTÍNEZ            Acquired hypothyroidism 07/10/2017     Priority: Medium    Dementia with behavioral disturbance, unspecified dementia type (H) 07/10/2017     Priority: Medium    Alcohol use disorder 07/10/2017     Priority: Medium        Past Medical History:    Past Medical History:   Diagnosis Date    BPH (benign prostatic hyperplasia)     Poor dentition        Past Surgical History:    Past Surgical History:   Procedure Laterality Date    ARTHROPLASTY KNEE Right 2022    Procedure: RIGHT TOTAL KNEE ARTHROPLASTY;  Surgeon: Brody Dobson MD;  Location: [facility]    Bilateral cataract      COLONOSCOPY N/A 2021    Procedure: Colonoscopy;  Repeat colonoscopy due to incomplete colonoscopy;  Surgeon: Silvio Guzmán MD;  Location: [facility]    ENDOSCOPY UPPER, COLONOSCOPY, COMBINED N/A 2021    Procedure: colonoscopy attempted and upper endoscopy with biopsies;  Surgeon: Silvio Guzmán MD;  Location: [facility]    ESOPHAGOSCOPY, GASTROSCOPY, DUODENOSCOPY (EGD), COMBINED N/A 5/10/2019    Procedure: UPPER ENDOSCOPY WITH BIOPSIES;  Surgeon: Silvio Guzmán MD;  Location: [facility] OR    HERNIA REPAIR Right ?    Right knee meniscectomy Right        Family History:    No family history on file.    Social History:  Marital Status:  Single [1]  Social History     Tobacco Use    Smoking status: Former     Current packs/day: 0.00     Types: Cigarettes     Quit date: 1993     Years since quittin.9     Passive exposure: Past    Smokeless tobacco: Never   Vaping Use    Vaping status: Never Used   Substance Use Topics    Alcohol use: No         Medications:    acetaminophen (TYLENOL) 500 MG tablet  alum & mag hydroxide-simethicone (MAALOX) 200-200-20 MG/5ML SUSP suspension  aspirin (ASPIRIN LOW DOSE) 81 MG EC tablet  bisacodyl (DULCOLAX) 10 MG suppository  cloNIDine (CATAPRES) 0.1 MG tablet  diclofenac (VOLTAREN) 1 % topical gel  docusate sodium (COLACE) 100 MG capsule  hypromellose (ARTIFICIAL TEARS) 0.5 % SOLN ophthalmic solution  levothyroxine (SYNTHROID/LEVOTHROID) 50 MCG tablet  loperamide (IMODIUM) 2 MG capsule  magnesium hydroxide (MILK OF MAGNESIA) 400 MG/5ML suspension  meloxicam (MOBIC) 7.5 MG tablet  pantoprazole (PROTONIX) 40 MG EC tablet  QUEtiapine (SEROQUEL) 300 MG tablet  simvastatin (ZOCOR) 40 MG tablet  Sodium Phosphates (ENEMA READY-TO-USE) 7-19 GM/118ML ENEM  vitamin D3 25 mcg (1000 units) tablet  Vitamins A & D (VITAMIN A & D) external ointment          Review of Systems   Constitutional: Negative.    HENT: Negative.     Eyes: Negative.    Respiratory: Negative.     Cardiovascular: Negative.    Gastrointestinal: Negative.    Endocrine: Negative.    Genitourinary: Negative.    Musculoskeletal: Negative.    Skin: Negative.    Allergic/Immunologic: Negative.    Neurological: Negative.    Hematological: Negative.    Psychiatric/Behavioral:  Positive for behavioral problems. Negative for suicidal ideas.        Physical Exam   BP: 156/91  Pulse: 88  Temp: 98.8  F (37.1  C)  Resp: 18  SpO2: 94 %      GENERAL APPEARANCE:  The patient is a 79 year old well-developed, well-nourished individual in no acute distress that appears as stated age.  LUNGS:  Breathing is easy.  Breath sounds are equal and clear bilaterally.  No wheezes, rhonchi, or rales.  HEART:  Regular rate and rhythm with normal S1 and S2.  No murmurs, gallops, or rubs.  NEUROLOGIC:  No focal sensory or motor deficits are noted.   PSYCHIATRIC:   Gait and Station: Normal  Psychomotor Behavior:  no evidence of tardive dyskinesia, dystonia, or tics  Attention Span and  Concentration:  intact  Eye Contact:  good  Speech:  clear, coherent  Mood:  good  Affect:  mood congruent  Thought Process:  logical and linear  Thought Content:  no evidence of suicidal ideation or homicidal ideation  Oriented to:  time, person, and place  Recent and Remote Memory:  intact  Judgment:  intact  Attitude:  cooperative  Insight:   not assessed    SKIN:  Warm, dry, and well perfused.  Good turgor.  No lesions, nodules, or rashes are noted.  No bruising noted.      Comment: Discrepancies between my note and notes on behalf of the nursing team or other care providers are secondary to my findings reflecting my physical examination and questioning of the patient.  Any conflicting information provided is not in line with my examination of the patient.       ED Course     ED Course as of 04/30/24 2129 Tue Apr 30, 2024 1755 In to see patient and history/physical completed.    1816 Discussed case w with patient's  Jovan ([phone]).  Apparently patient needs psychiatric evaluation and clearance to be able to go to home tomorrow ([facility] in [place], [place]).  If not cleared will need to go to [facility].   1826 DEC assessment ordered.   2007 DEC  seen patient.  States patient okay to be discharged tomorrow morning to his new group home.  Jovan from  will be here early tomorrow to  patient.   2022 Home medications ordered.                 Medications   cloNIDine (CATAPRES) tablet 0.1 mg (0.1 mg Oral $Given 4/30/24 2105)   docusate sodium (COLACE) capsule 200 mg (200 mg Oral $Given 4/30/24 2105)   QUEtiapine (SEROquel) tablet 900 mg (900 mg Oral $Given 4/30/24 2106)   simvastatin (ZOCOR) tablet 40 mg (40 mg Oral $Given 4/30/24 2105)   aspirin EC tablet 81 mg (has no administration in time range)   levothyroxine (SYNTHROID/LEVOTHROID) tablet 50 mcg (has no administration in time range)   meloxicam (MOBIC) tablet 7.5 mg (has no administration in  time range)   pantoprazole (PROTONIX) EC tablet 40 mg (has no administration in time range)       Assessments & Plan (with Medical Decision Making)     I have reviewed the nursing notes.    I have reviewed the findings, diagnosis, plan and need for follow up with the patient.      Summary:  Patient presents to the ER today for psychiatric evaluation.  Potential diagnosis which have been considered and evaluated include suicidal, homicidal, situational anxiety, as well as others. Many of these have been excluded using the various modalities and assessment as noted on the chart. At the present time, the diagnosis given seems to be the most likely situational anxiety  Upon arrival, vitals signs are normal.  The patient is alert and in no distress.  Cardiac and respiratory examination normal.  Patient comes in via law enforcement for psychiatric evaluation.  Apparently is deemed incompetent and needs caregiver.  Patient's current living facility has evicted him and patient is required to have living facility.  Apparently a new place has been following for patient at [facility] in [place], [place], but patient cannot go there until 5/1/2024.  In the interim patient became verbally aggressive with staff member at current living facility so he was evicted.  This was the revocation of patient's commitment [place] forced med was called.  Patient is brought to the ER for evaluation.  Spoke to patient  Jovan ([phone]) who states that he discussed this with the revocation facility where patient was post go back to ([facility]).  Apparently they informed that patient needs to be brought to the local emergency room for evaluation.  If deemed psychologically stable can go to [facility] tomorrow.  If not we will need restabilization at [facility].  For this reason DEC assessment was ordered.  DEC  does not recommend bringing patient back to inpatient status.  For this  reason patient can be discharged to [facility] tomorrow morning (5/1/2024) patient  Jovan will pick patient up early.  In the interim of this home meds ordered.  Handoff given to oncoming shift awaiting a ride arrival.        Critical Care Time: None    Impression and plan discussed with patient. Questions answered, concerns addressed, indications for urgent re-evaluation reviewed, and  given. Patient/Parent/Caregiver agree with treatment plan and have no further questions at this time.  AVS provided at discharge.    This note was created by the Dragon Voice Dictation System. Inadvertent typographical errors, due to software recognition problems, may still exist.             New Prescriptions    No medications on file       Final diagnoses:   Situational anxiety       4/30/2024   [facility]       Bob Brenner APRN CNP  04/30/24 2129       Bob Brenner APRN CNP  04/30/24 2139

## 2024-05-01 ENCOUNTER — MEDICAL CORRESPONDENCE (OUTPATIENT)
Dept: HEALTH INFORMATION MANAGEMENT | Facility: CLINIC | Age: 79
End: 2024-05-01

## 2024-05-01 ENCOUNTER — MEDICAL CORRESPONDENCE (OUTPATIENT)
Dept: HEALTH INFORMATION MANAGEMENT | Facility: HOSPITAL | Age: 79
End: 2024-05-01

## 2024-05-01 ASSESSMENT — ACTIVITIES OF DAILY LIVING (ADL)
ADLS_ACUITY_SCORE: 38

## 2024-05-01 NOTE — ED NOTES
Souleymane Rubio that patient needs morning meds. Patient was dressed, outside of the room with the Ride, when discharged. No Vital signs taken upon discharge
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
## 2024-05-01 NOTE — ED NOTES
Writer introduced self to pt. Taking over his care. No complaints. Very talkative and pleasant, requesting more coffee.

## 2024-05-01 NOTE — ED NOTES
Face to face report given with opportunity to observe patient.    Report given to Jamilah Cortez RN   4/30/2024  9:22 PM

## 2024-05-01 NOTE — DISCHARGE INSTRUCTIONS
Follow-up with your primary care provider for reevaluation.  Contact your primary care provider if you have any questions or concerns.  Do not hesitate to return to the ER if any new or worsening symptoms.     Please read the attached instructions (if any).  They highlight more specific treatments and interventions for you at home.              Thank you for letting me participate in your care and wish you a fast and uneventful recovery,    Bob DEGROOT CNP    Do not hesitate to contact me with questions or concerns.  [email or web address]

## 2024-05-01 NOTE — ED NOTES
Care Transitions focused note:      Chart reviewed patient was residing at [facility] [phone], spoke to Jennifer there and updated her regarding discharge plans.    Call to  from [facility] Jovan Klein [phone]  He is aware patient is here and is planning on having him admitted to [facility] in [place] today.    Text sent to Joanne Soto RN at [facility] with ETA (9:30 am)    Updated nsg and provider with plan.    JERMAN Ortiz
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
## 2024-05-20 DIAGNOSIS — M25.50 MULTIPLE JOINT PAIN: ICD-10-CM

## 2024-05-20 RX ORDER — MELOXICAM 7.5 MG/1
7.5 TABLET ORAL DAILY
Qty: 28 TABLET | Refills: 0 | Status: SHIPPED | OUTPATIENT
Start: 2024-05-20 | End: 2024-06-20

## 2024-05-20 NOTE — TELEPHONE ENCOUNTER
NSAID Medications Failed      Blood pressure under 140/90 in past 12 months        BP Readings from Last 3 Encounters:   04/30/24 162/98   04/30/24 120/70   04/16/24 125/82      No data recorded       Patient is age 6-64 years      Always Fail Criteria - Chart Review Required    Validate Diagnosis. If the medication is requested for an acute flare of a chronic pain associated with a musculoskeletal or rheumatologic condition; okay to authorize if all other criteria are met. If not, then forward to provider for review.    Normal GFR on file in past 12 months          Recent Labs   Lab Test 04/16/24  1012 03/18/22  1348 04/29/21  1537   GFRESTIMATED 58*   < > 72   GFRESTBLACK  --   --  83    < > = values in this interval not displayed.

## 2024-06-20 DIAGNOSIS — M25.50 MULTIPLE JOINT PAIN: ICD-10-CM

## 2024-06-20 RX ORDER — MELOXICAM 7.5 MG/1
7.5 TABLET ORAL DAILY
Qty: 28 TABLET | Refills: 0 | Status: SHIPPED | OUTPATIENT
Start: 2024-06-20 | End: 2024-07-18

## 2024-06-20 NOTE — TELEPHONE ENCOUNTER
Failed protocol    GFR Estimate   Date Value Ref Range Status   04/16/2024 58 (L) >60 mL/min/1.73m2 Final   11/09/2023 61 >60 mL/min/1.73m2 Final   09/20/2023 64 >60 mL/min/1.73m2 Final   04/29/2021 72 >60 mL/min/[1.73_m2] Final     Comment:     Non  GFR Calc  Starting 12/18/2018, serum creatinine based estimated GFR (eGFR) will be   calculated using the Chronic Kidney Disease Epidemiology Collaboration   (CKD-EPI) equation.     12/21/2020 82 >60 mL/min/[1.73_m2] Final     Comment:     Non  GFR Calc  Starting 12/18/2018, serum creatinine based estimated GFR (eGFR) will be   calculated using the Chronic Kidney Disease Epidemiology Collaboration   (CKD-EPI) equation.     09/11/2018 61 >60 mL/min/1.7m2 Final     Comment:     Non  GFR Calc     GFR Estimate If Black   Date Value Ref Range Status   04/29/2021 83 >60 mL/min/[1.73_m2] Final     Comment:      GFR Calc  Starting 12/18/2018, serum creatinine based estimated GFR (eGFR) will be   calculated using the Chronic Kidney Disease Epidemiology Collaboration   (CKD-EPI) equation.     12/21/2020 >90 >60 mL/min/[1.73_m2] Final     Comment:      GFR Calc  Starting 12/18/2018, serum creatinine based estimated GFR (eGFR) will be   calculated using the Chronic Kidney Disease Epidemiology Collaboration   (CKD-EPI) equation.     09/11/2018 73 >60 mL/min/1.7m2 Final     Comment:      GFR Calc     BP Readings from Last 3 Encounters:   04/30/24 162/98   04/30/24 120/70   04/16/24 125/82      meloxicam (MOBIC) 7.5 MG tablet       Last Written Prescription Date:  5/20/24  Last Fill Quantity: 28,   # refills: 0  Last Office Visit: 4/30/24  Future Office visit:       Routing refill request to provider for review/approval because:

## 2024-07-18 DIAGNOSIS — M25.50 MULTIPLE JOINT PAIN: ICD-10-CM

## 2024-07-18 DIAGNOSIS — K59.00 CONSTIPATION, UNSPECIFIED CONSTIPATION TYPE: ICD-10-CM

## 2024-07-18 DIAGNOSIS — E55.9 VITAMIN D DEFICIENCY: ICD-10-CM

## 2024-07-18 RX ORDER — VITAMIN B COMPLEX
TABLET ORAL
Qty: 28 TABLET | Refills: 4 | Status: SHIPPED | OUTPATIENT
Start: 2024-07-18

## 2024-07-18 RX ORDER — MELOXICAM 7.5 MG/1
7.5 TABLET ORAL DAILY
Qty: 28 TABLET | Refills: 0 | Status: SHIPPED | OUTPATIENT
Start: 2024-07-18 | End: 2024-08-16

## 2024-07-18 RX ORDER — DOCUSATE SODIUM 100 MG/1
CAPSULE, LIQUID FILLED ORAL
Qty: 112 CAPSULE | Refills: 3 | Status: SHIPPED | OUTPATIENT
Start: 2024-07-18

## 2024-07-18 NOTE — TELEPHONE ENCOUNTER
Vitamin D      Last Written Prescription Date:  10/4/23  Last Fill Quantity: 28,   # refills: 10  Last Office Visit: 4/30/24  Future Office visit:    Next 5 appointments (look out 90 days)      Sep 24, 2024 1:30 PM  (Arrive by 1:15 PM)  Adult Preventative Visit with ZANE Navarrete CNP  [facility] [place] ([facility] [place] ) [address]  [place] [address]  [phone]             Routing refill request to provider for review/approval because:      Colace      Last Written Prescription Date:  10/4/23  Last Fill Quantity: 112,   # refills: 10  Last Office Visit: 4/30/24  Future Office visit:    Next 5 appointments (look out 90 days)      Sep 24, 2024 1:30 PM  (Arrive by 1:15 PM)  Adult Preventative Visit with ZANE Navarrete CNP  [facility] [place] ([facility] [place] ) [address]  [place] [address]  [phone]             Routing refill request to provider for review/approval because:

## 2024-07-18 NOTE — TELEPHONE ENCOUNTER
Meloxicam      Last Written Prescription Date:  6/20/24  Last Fill Quantity: 28,   # refills: 0  Last Office Visit: 4/30/24  Future Office visit:    Next 5 appointments (look out 90 days)      Sep 24, 2024 1:30 PM  (Arrive by 1:15 PM)  Adult Preventative Visit with ZANE Navarrete CNP  [facility] [place] ([facility] [place] ) [address]  [place] [address]  [phone]             Routing refill request to provider for review/approval because:

## 2024-08-15 DIAGNOSIS — E03.9 HYPOTHYROIDISM, UNSPECIFIED TYPE: ICD-10-CM

## 2024-08-15 DIAGNOSIS — E78.5 HYPERLIPIDEMIA LDL GOAL <100: ICD-10-CM

## 2024-08-15 DIAGNOSIS — K21.00 GASTROESOPHAGEAL REFLUX DISEASE WITH ESOPHAGITIS WITHOUT HEMORRHAGE: ICD-10-CM

## 2024-08-15 DIAGNOSIS — M25.50 MULTIPLE JOINT PAIN: ICD-10-CM

## 2024-08-15 RX ORDER — SIMVASTATIN 40 MG
TABLET ORAL
Qty: 28 TABLET | Refills: 4 | Status: SHIPPED | OUTPATIENT
Start: 2024-08-15

## 2024-08-15 RX ORDER — LEVOTHYROXINE SODIUM 50 UG/1
50 TABLET ORAL DAILY
Qty: 28 TABLET | Refills: 4 | Status: SHIPPED | OUTPATIENT
Start: 2024-08-15

## 2024-08-15 RX ORDER — PANTOPRAZOLE SODIUM 40 MG/1
40 TABLET, DELAYED RELEASE ORAL DAILY
Qty: 28 TABLET | Refills: 4 | Status: SHIPPED | OUTPATIENT
Start: 2024-08-15

## 2024-08-16 RX ORDER — MELOXICAM 7.5 MG/1
7.5 TABLET ORAL DAILY
Qty: 28 TABLET | Refills: 0 | Status: SHIPPED | OUTPATIENT
Start: 2024-08-16 | End: 2024-09-13

## 2024-08-16 NOTE — TELEPHONE ENCOUNTER
Olga      Last Written Prescription Date:  07/18/24  Last Fill Quantity: 28,   # refills: 0  Last Office Visit: 04/30/24  Future Office visit:    Next 5 appointments (look out 90 days)      Sep 24, 2024 1:30 PM  (Arrive by 1:15 PM)  Adult Preventative Visit with ZANE Navarrete CNP  [facility] [place] ([facility] [place] ) [address]  [place] [address]  [phone]

## 2024-08-16 NOTE — TELEPHONE ENCOUNTER
NSAID Medications [id number]/15/2024 04:18 PM   Protocol Details Blood pressure under 140/90 in past 12 months    Patient is age 6-64 years    Always Fail Criteria - Chart Review Required    Normal GFR on file in past 12 months

## 2024-08-26 ENCOUNTER — OFFICE VISIT (OUTPATIENT)
Dept: FAMILY MEDICINE | Facility: OTHER | Age: 79
End: 2024-08-26
Attending: NURSE PRACTITIONER
Payer: COMMERCIAL

## 2024-08-26 ENCOUNTER — TELEPHONE (OUTPATIENT)
Dept: EMERGENCY MEDICINE | Facility: HOSPITAL | Age: 79
End: 2024-08-26

## 2024-08-26 VITALS
BODY MASS INDEX: 22.13 KG/M2 | TEMPERATURE: 96.9 F | DIASTOLIC BLOOD PRESSURE: 80 MMHG | RESPIRATION RATE: 16 BRPM | WEIGHT: 158.7 LBS | OXYGEN SATURATION: 94 % | HEART RATE: 80 BPM | SYSTOLIC BLOOD PRESSURE: 138 MMHG

## 2024-08-26 DIAGNOSIS — F03.918 DEMENTIA WITH BEHAVIORAL DISTURBANCE (H): ICD-10-CM

## 2024-08-26 DIAGNOSIS — E03.9 ACQUIRED HYPOTHYROIDISM: Primary | ICD-10-CM

## 2024-08-26 LAB
ALBUMIN SERPL BCG-MCNC: 4.2 G/DL (ref 3.5–5.2)
ALP SERPL-CCNC: 95 U/L (ref 40–150)
ALT SERPL W P-5'-P-CCNC: 10 U/L (ref 0–70)
ANION GAP SERPL CALCULATED.3IONS-SCNC: 14 MMOL/L (ref 7–15)
AST SERPL W P-5'-P-CCNC: 23 U/L (ref 0–45)
BILIRUB SERPL-MCNC: 0.4 MG/DL
BUN SERPL-MCNC: 14.2 MG/DL (ref 8–23)
CALCIUM SERPL-MCNC: 9 MG/DL (ref 8.8–10.4)
CHLORIDE SERPL-SCNC: 105 MMOL/L (ref 98–107)
CREAT SERPL-MCNC: 1.14 MG/DL (ref 0.67–1.17)
EGFRCR SERPLBLD CKD-EPI 2021: 65 ML/MIN/1.73M2
GLUCOSE SERPL-MCNC: 143 MG/DL (ref 70–99)
HCO3 SERPL-SCNC: 23 MMOL/L (ref 22–29)
POTASSIUM SERPL-SCNC: 4 MMOL/L (ref 3.4–5.3)
PROT SERPL-MCNC: 7.4 G/DL (ref 6.4–8.3)
SODIUM SERPL-SCNC: 142 MMOL/L (ref 135–145)
TSH SERPL DL<=0.005 MIU/L-ACNC: 4.08 UIU/ML (ref 0.3–4.2)

## 2024-08-26 PROCEDURE — 80053 COMPREHEN METABOLIC PANEL: CPT | Mod: ZL | Performed by: NURSE PRACTITIONER

## 2024-08-26 PROCEDURE — 36415 COLL VENOUS BLD VENIPUNCTURE: CPT | Mod: ZL | Performed by: NURSE PRACTITIONER

## 2024-08-26 PROCEDURE — 84443 ASSAY THYROID STIM HORMONE: CPT | Mod: ZL | Performed by: NURSE PRACTITIONER

## 2024-08-26 PROCEDURE — 99213 OFFICE O/P EST LOW 20 MIN: CPT | Performed by: NURSE PRACTITIONER

## 2024-08-26 PROCEDURE — G2211 COMPLEX E/M VISIT ADD ON: HCPCS | Performed by: NURSE PRACTITIONER

## 2024-08-26 PROCEDURE — G0463 HOSPITAL OUTPT CLINIC VISIT: HCPCS

## 2024-08-26 ASSESSMENT — PAIN SCALES - GENERAL: PAINLEVEL: NO PAIN (0)
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
## 2024-08-26 NOTE — PATIENT INSTRUCTIONS
Please schedule follow up with mental health providers  Counseling and psychiatry      Try not to leave without letting someone know at [facility]

## 2024-09-05 NOTE — TELEPHONE ENCOUNTER
NURSING INTAKE COMMENTS:   Chief Complaint   Patient presents with    Shoulder Pain     Bilateral shoulder -L f/u - R shoulder -onset- a month ago- denies injury- L pain 5-6/10- R pain 7/10 all the time- stated shooting pain- has numbness and tingling radiating to her R hand most of the time       HPI: This 52 year old female presents today with complaints of left shoulder pain follow-up, right shoulder and neck pain.  Over the last month she has noted increased pain in her neck on the right side extending into the posterior and lateral shoulder.  She feels pain extending all the way into the hand on occasion.  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  Her left shoulder remains painful.  She did not follow through with any physical therapy due to insurance limitations.  She has some difficulty lifting the arm above shoulder height.  She does have night pain in the neck and left shoulder.  She feels tingling in her right hand.    Past Medical History:    Allergic rhinitis    lifetime allergies    Anxiety    on & off continued since bad marriage & very much from work stress    Carpal tunnel syndrome    right hand    Cervicogenic headache    COVID-19 vaccine administered    Depression    on & off continued since bad marriage & from work    Endometriosis    Inflammatory arthritis    Migraine with aura    PONV (postoperative nausea and vomiting)     Past Surgical History:   Procedure Laterality Date    Colonoscopy N/A 2023    Procedure: COLONOSCOPY;  Surgeon: Sheela Nixon MD;  Location: [facility] MAIN OR    D & c  1998    miscarriage of identical twins    Hysterectomy      ovaries still present    Laparoscopic                  Other surgical history      wisdom teeth     Current Outpatient Medications   Medication Sig Dispense Refill    methylPREDNISolone 4 MG Oral Tablet Therapy Pack Take as per instruction sheet. Do not take anti-inflammatory meds  Olga       Last Written Prescription Date:  4/22/2024  Last Fill Quantity: 28,   # refills: 0  Last Office Visit: 4/30/2024  Future Office visit:        such as ibuprofen or naproxen while on this med. 1 each 0    Eletriptan Hydrobromide 40 MG Oral Tab TAKE ONE TABLET BY MOUTH AT ONSET. MAY REPEAT ONCE AFTER FOUR HOURS. DO NOT EXCEED TWO TABLETS WITHIN 24 HOURS. TAKE FOR 30 DAYS 8 tablet 0    FLUoxetine 10 MG Oral Cap Take 1 capsule (10 mg total) by mouth every evening. 90 capsule 0    Cholecalciferol (VITAMIN D) 50 MCG (2000 UT) Oral Cap Take by mouth.      Conj Estrog-Medroxyprogest Ace 0.3-1.5 MG Oral Tab Take 1 tablet by mouth daily. 84 tablet 4    ondansetron (ZOFRAN) 4 mg tablet 1-2 tablets as needed for nausea, no more than 4 tablets in 24 hours 30 tablet 3    Ascorbic Acid (VITAMIN C) 1000 MG Oral Tab Take 1 tablet (1,000 mg total) by mouth daily.      pseudoephedrine 30 MG Oral Tab Take 2 tablets (60 mg total) by mouth daily.      ibuprofen 200 MG Oral Tab Take 3 tablets (600 mg total) by mouth daily.       Allergies   Allergen Reactions    Adhesive Tape HIVES and SWELLING     Some paper tape ok , if not use coban     Codeine     Morphine     Vicodin [Hydrocodone-Acetaminophen]      Family History   Problem Relation Age of Onset    Cancer Maternal Grandmother 41         at age 42 of breast cancer    Breast Cancer Maternal Grandmother 41    Cancer Mother         Breast cancer diagnosis age 74/mastectomy       Social History     Occupational History    Not on file   Tobacco Use    Smoking status: Never    Smokeless tobacco: Never   Vaping Use    Vaping status: Never Used   Substance and Sexual Activity    Alcohol use: Yes     Alcohol/week: 1.0 standard drink of alcohol     Types: 1 Standard drinks or equivalent per week     Comment: 2 or 3/month on a weekend when home/not working    Drug use: No    Sexual activity: Not on file        Review of Systems:  GENERAL: denies fevers, chills, night sweats, fatigue, unintentional weight loss/gain  SKIN: denies skin lesions, open sores, rash  HEENT:denies recent vision change, new nasal congestion,hearing loss,  tinnitus, sore throat, headaches  RESPIRATORY: denies new shortness of breath, cough, asthma, wheezing  CARDIOVASCULAR: denies chest pain, leg cramps with exertion, palpitations, leg swelling  GI: denies abdominal pain, nausea, vomiting, diarrhea, constipation, hematochezia, worsening heartburn or stomach ulcers  : denies dysuria, hematuria, incontinence, increased frequency, urgency, difficulty urinating  MUSCULOSKELETAL: denies musculoskeletal complaints other than in HPI  NEURO: denies numbness, tingling, weakness, balance issues, dizziness, memory loss  PSYCHIATRIC: denies Hx of depression, anxiety, other psychiatric disorders  HEMATOLOGIC: denies blood clots, anemia, blood clotting disorders, blood transfusion  ENDOCRINE: denies autoimmune disease, thyroid issues, or diabetes  ALLERGY: denies asthma, seasonal allergies    Physical Examination:    /70   Constitutional: appears well hydrated, alert and responsive, no acute distress noted  Extremities: Left shoulder active forward flexion to 120 degrees with pain.  Active external rotation 60 degrees.  Passive internal rotation 70 degrees with pain.  Starr impingement sign strongly positive.  Abduction external rotation motor strength is 4+ out of 5.  Right shoulder range of motion is full with normal 5 out of 5 strength in abduction external rotation.  Spurling sign is strongly positive with the right returning.  Cervical spine is diffusely tender along the paraspinal musculature on the right.  Neurological: Triceps strength 4-5 on the right 5 out of 5 in the left.  Pinprick sensation intact throughout the hands.  Connor sign negative bilaterally.    Imaging:   X-rays of both shoulders obtained the office today show no abnormalities in the right shoulder.  Left shoulder has a large calcific density in the subacromial space consistent with calcific tendinopathy.    Labs:  Lab Results   Component Value Date    WBC 7.2 04/20/2024    HGB 14.4 04/20/2024     .0 04/20/2024      Lab Results   Component Value Date    GLU 84 04/20/2024    BUN 9 04/20/2024    CREATSERUM 0.79 04/20/2024    GFR 94 07/30/2016    GFRNAA 97 05/09/2022    GFRAA 111 05/09/2022        Assessment and Plan:  Diagnoses and all orders for this visit:    Cervical radiculopathy  -     MRI SPINE CERVICAL (CPT=72141); Future    Shoulder pain, unspecified chronicity, unspecified laterality  -     XR SHOULDER BILAT (CPT=73030-50); Future    Calcific supraspinatus tendonitis  -     arthrocentesis major joint  -     triamcinolone acetonide (Kenalog-40) 40 MG/ML injection 40 mg    Other orders  -     methylPREDNISolone 4 MG Oral Tablet Therapy Pack; Take as per instruction sheet. Do not take anti-inflammatory meds such as ibuprofen or naproxen while on this med.        Assessment: Left shoulder persistent rotator cuff calcific tendinitis, right upper extremity and neck pain consistent with cervical radiculopathy    Plan: I recommend MRI of the cervical spine given her numbness and tingling in the right hand as well as some weakness in the extremity.  Follow-up again after the MRI.  I prescribed a Medrol Dosepak.  The left subacromial space was injected with 40 mg of Kenalog.  Consider arthroscopic debridement rotator cuff repair if no improvement.  Would need an MRI of the left shoulder at a follow-up appointment.  Avoid heavy lifting pushing and pulling of both upper extremities.    Follow Up: Return for follow-up visit after ordered tests completed.    NAMAN ODEN MD

## 2024-09-11 ENCOUNTER — MEDICAL CORRESPONDENCE (OUTPATIENT)
Dept: HEALTH INFORMATION MANAGEMENT | Facility: CLINIC | Age: 79
End: 2024-09-11

## 2024-09-11 ENCOUNTER — HOSPITAL ENCOUNTER (EMERGENCY)
Facility: HOSPITAL | Age: 79
Discharge: NURSING, REHAB OR LONG-TERM CARE | End: 2024-09-11
Attending: NURSE PRACTITIONER | Admitting: NURSE PRACTITIONER
Payer: COMMERCIAL

## 2024-09-11 VITALS
SYSTOLIC BLOOD PRESSURE: 142 MMHG | HEART RATE: 90 BPM | RESPIRATION RATE: 16 BRPM | TEMPERATURE: 98.8 F | OXYGEN SATURATION: 94 % | DIASTOLIC BLOOD PRESSURE: 95 MMHG

## 2024-09-11 DIAGNOSIS — F03.911 DEMENTIA WITH AGITATION, UNSPECIFIED DEMENTIA SEVERITY, UNSPECIFIED DEMENTIA TYPE (H): ICD-10-CM

## 2024-09-11 DIAGNOSIS — F03.918 DEMENTIA WITH AGGRESSIVE BEHAVIOR (H): ICD-10-CM

## 2024-09-11 LAB
ALBUMIN UR-MCNC: 50 MG/DL
ANION GAP SERPL CALCULATED.3IONS-SCNC: 12 MMOL/L (ref 7–15)
APPEARANCE UR: CLEAR
BILIRUB UR QL STRIP: NEGATIVE
BUN SERPL-MCNC: 18.4 MG/DL (ref 8–23)
CALCIUM SERPL-MCNC: 8.9 MG/DL (ref 8.8–10.4)
CHLORIDE SERPL-SCNC: 109 MMOL/L (ref 98–107)
COLOR UR AUTO: YELLOW
CREAT SERPL-MCNC: 1.15 MG/DL (ref 0.67–1.17)
EGFRCR SERPLBLD CKD-EPI 2021: 64 ML/MIN/1.73M2
ERYTHROCYTE [DISTWIDTH] IN BLOOD BY AUTOMATED COUNT: 14.3 % (ref 10–15)
GLUCOSE SERPL-MCNC: 161 MG/DL (ref 70–99)
GLUCOSE UR STRIP-MCNC: NEGATIVE MG/DL
HCO3 SERPL-SCNC: 25 MMOL/L (ref 22–29)
HCT VFR BLD AUTO: 39.2 % (ref 40–53)
HGB BLD-MCNC: 12.8 G/DL (ref 13.3–17.7)
HGB UR QL STRIP: NEGATIVE
HOLD SPECIMEN: NORMAL
HYALINE CASTS: 6 /LPF
KETONES UR STRIP-MCNC: NEGATIVE MG/DL
LEUKOCYTE ESTERASE UR QL STRIP: NEGATIVE
MCH RBC QN AUTO: 30 PG (ref 26.5–33)
MCHC RBC AUTO-ENTMCNC: 32.7 G/DL (ref 31.5–36.5)
MCV RBC AUTO: 92 FL (ref 78–100)
MUCOUS THREADS #/AREA URNS LPF: PRESENT /LPF
NITRATE UR QL: NEGATIVE
PH UR STRIP: 5.5 [PH] (ref 4.7–8)
PLATELET # BLD AUTO: 158 10E3/UL (ref 150–450)
POTASSIUM SERPL-SCNC: 4 MMOL/L (ref 3.4–5.3)
RBC # BLD AUTO: 4.26 10E6/UL (ref 4.4–5.9)
RBC URINE: 2 /HPF
SODIUM SERPL-SCNC: 146 MMOL/L (ref 135–145)
SP GR UR STRIP: 1.03 (ref 1–1.03)
SQUAMOUS EPITHELIAL: 0 /HPF
TSH SERPL DL<=0.005 MIU/L-ACNC: 3.58 UIU/ML (ref 0.3–4.2)
UROBILINOGEN UR STRIP-MCNC: 2 MG/DL
WBC # BLD AUTO: 3.9 10E3/UL (ref 4–11)
WBC URINE: 1 /HPF

## 2024-09-11 PROCEDURE — 99283 EMERGENCY DEPT VISIT LOW MDM: CPT

## 2024-09-11 PROCEDURE — 36415 COLL VENOUS BLD VENIPUNCTURE: CPT | Performed by: NURSE PRACTITIONER

## 2024-09-11 PROCEDURE — 84443 ASSAY THYROID STIM HORMONE: CPT | Performed by: NURSE PRACTITIONER

## 2024-09-11 PROCEDURE — 80048 BASIC METABOLIC PNL TOTAL CA: CPT | Performed by: NURSE PRACTITIONER

## 2024-09-11 PROCEDURE — 250N000013 HC RX MED GY IP 250 OP 250 PS 637: Performed by: NURSE PRACTITIONER

## 2024-09-11 PROCEDURE — 99283 EMERGENCY DEPT VISIT LOW MDM: CPT | Performed by: NURSE PRACTITIONER

## 2024-09-11 PROCEDURE — 85027 COMPLETE CBC AUTOMATED: CPT | Performed by: NURSE PRACTITIONER

## 2024-09-11 PROCEDURE — 81003 URINALYSIS AUTO W/O SCOPE: CPT | Performed by: NURSE PRACTITIONER

## 2024-09-11 RX ORDER — QUETIAPINE 300 MG/1
900 TABLET, FILM COATED, EXTENDED RELEASE ORAL ONCE
Status: COMPLETED | OUTPATIENT
Start: 2024-09-11 | End: 2024-09-11

## 2024-09-11 RX ADMIN — QUETIAPINE FUMARATE 900 MG: 300 TABLET, EXTENDED RELEASE ORAL at 18:16

## 2024-09-11 ASSESSMENT — COLUMBIA-SUICIDE SEVERITY RATING SCALE - C-SSRS
1. IN THE PAST MONTH, HAVE YOU WISHED YOU WERE DEAD OR WISHED YOU COULD GO TO SLEEP AND NOT WAKE UP?: NO
6. HAVE YOU EVER DONE ANYTHING, STARTED TO DO ANYTHING, OR PREPARED TO DO ANYTHING TO END YOUR LIFE?: NO
2. HAVE YOU ACTUALLY HAD ANY THOUGHTS OF KILLING YOURSELF IN THE PAST MONTH?: NO

## 2024-09-11 ASSESSMENT — ACTIVITIES OF DAILY LIVING (ADL)
ADLS_ACUITY_SCORE: 38
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
## 2024-09-12 DIAGNOSIS — M25.50 MULTIPLE JOINT PAIN: ICD-10-CM

## 2024-09-12 NOTE — ED NOTES
Face to face report given with opportunity to observe patient.    Report given to DELVIN Bucio RN   9/11/2024  7:18 PM

## 2024-09-12 NOTE — TELEPHONE ENCOUNTER
meloxicam (MOBIC) 7.5 MG tablet       Last Written Prescription Date:  8/16/24  Last Fill Quantity: 28,   # refills: 0  Last Office Visit: 8/26/24  Future Office visit:    Next 5 appointments (look out 90 days)      Sep 24, 2024 1:30 PM  (Arrive by 1:15 PM)  Adult Preventative Visit with ZANE Navarrete CNP  [facility] [place] ([facility] [place] ) [address]  [place] [address]  [phone]             Routing refill request to provider for review/approval because:  NSAID Medications Failed      Blood pressure under 140/90 in past 12 months        BP Readings from Last 3 Encounters:   09/11/24 142/95   08/26/24 138/80   04/30/24 162/98      No data recorded       Patient is age 6-64 years      Normal CBC on file in past 12 months        Recent Labs   Lab Test 09/11/24  1655   WBC 3.9*   RBC 4.26*   HGB 12.8*   HCT 39.2*              Always Fail Criteria - Chart Review Required    Validate Diagnosis. If the medication is requested for an acute flare of a chronic pain associated with a musculoskeletal or rheumatologic condition; okay to authorize if all other criteria are met. If not, then forward to provider for review.

## 2024-09-12 NOTE — ED NOTES
[facility] will be here shortly to transport patient back to [facility] in [place]. Joanne notified that patient will be coming back.

## 2024-09-12 NOTE — DISCHARGE INSTRUCTIONS
Follow-up with your primary care provider for reevaluation.  Contact your primary care provider if you have any questions or concerns.  Do not hesitate to return to the ER if any new or worsening symptoms.     Please read the attached instructions (if any).  They highlight more specific treatments and interventions for you at home.              Thank you for letting me participate in your care and wish you a fast and uneventful recovery,    Bob DEGROOT CNP    Do not hesitate to contact me with questions or concerns.  [email or web address]

## 2024-09-13 ENCOUNTER — HOSPITAL ENCOUNTER (EMERGENCY)
Facility: HOSPITAL | Age: 79
Discharge: ANOTHER HOSPITAL | End: 2024-09-17
Attending: PHYSICIAN ASSISTANT | Admitting: PHYSICIAN ASSISTANT
Payer: COMMERCIAL

## 2024-09-13 DIAGNOSIS — F03.90 DEMENTIA (H): ICD-10-CM

## 2024-09-13 PROCEDURE — 99285 EMERGENCY DEPT VISIT HI MDM: CPT

## 2024-09-13 PROCEDURE — 99283 EMERGENCY DEPT VISIT LOW MDM: CPT | Performed by: PHYSICIAN ASSISTANT

## 2024-09-13 RX ORDER — MELOXICAM 7.5 MG/1
7.5 TABLET ORAL DAILY
Qty: 28 TABLET | Refills: 0 | Status: SHIPPED | OUTPATIENT
Start: 2024-09-13

## 2024-09-13 ASSESSMENT — ACTIVITIES OF DAILY LIVING (ADL)
ADLS_ACUITY_SCORE: 38

## 2024-09-13 ASSESSMENT — COLUMBIA-SUICIDE SEVERITY RATING SCALE - C-SSRS
2. HAVE YOU ACTUALLY HAD ANY THOUGHTS OF KILLING YOURSELF IN THE PAST MONTH?: NO
1. IN THE PAST MONTH, HAVE YOU WISHED YOU WERE DEAD OR WISHED YOU COULD GO TO SLEEP AND NOT WAKE UP?: NO
6. HAVE YOU EVER DONE ANYTHING, STARTED TO DO ANYTHING, OR PREPARED TO DO ANYTHING TO END YOUR LIFE?: NO

## 2024-09-14 RX ORDER — QUETIAPINE FUMARATE 300 MG/1
900 TABLET, FILM COATED ORAL AT BEDTIME
Status: DISCONTINUED | OUTPATIENT
Start: 2024-09-15 | End: 2024-09-15

## 2024-09-14 ASSESSMENT — ACTIVITIES OF DAILY LIVING (ADL)
ADLS_ACUITY_SCORE: 38
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
## 2024-09-14 NOTE — ED NOTES
Spoke with Kassandra with central intake at [facility]. They do not have a bed available currently but will call us as soon as they have a bed available.

## 2024-09-14 NOTE — ED NOTES
"Pt given cup of coffee. Cooperative. Talkative. States he \"is suppose to get  today\" and wondering what the plan is. Pt informed we don't know the final details of admission but will; keep him informed.   "

## 2024-09-14 NOTE — ED NOTES
Spoke with Intake who reports that she doesn't think that there will be a bed available for patient over the weekend, but can continue to call for updates.

## 2024-09-14 NOTE — ED NOTES
Patient states he does not know all of the medications he takes. Medication list was not sent in with patient. Phone call to Joanne at [facility]. Voicemail left to call back. Unable to complete med rec until we have current medication list.

## 2024-09-14 NOTE — ED NOTES
Spoke with Paramjit, house supervisor, from [facility] in [place], [place]. Paramjit states we need to go through their central intake for this patient and the phone number is [phone].

## 2024-09-14 NOTE — ED PROVIDER NOTES
History     Chief Complaint   Patient presents with    Psychiatric Evaluation     The history is provided by the nursing home.     Christiano Leon is a 79 year old male who presented to the emergency department via police for revocation of discharge.  Patient was recently admitted to [facility] for psychiatric issues secondary to his dementia.  His discharge has been revoked.  Brought in to be transferred back down to [facility].  Multiple labs done just a few days ago.    Allergies:  Allergies   Allergen Reactions    Nickel      Possible. Cheap metal.    Penicillins     Seasonal Allergies      Perfumes       Problem List:    Patient Active Problem List    Diagnosis Date Noted    Depression 04/30/2024     Priority: Medium    Lives in assisted living facility 02/13/2024     Priority: Medium    S/P total knee arthroplasty, right 04/02/2022     Priority: Medium    Screening for colon cancer 06/02/2021     Priority: Medium     Added automatically from request for surgery [id number]      Bright red blood per rectum 05/19/2021     Priority: Medium     Added automatically from request for surgery [id number]      Chronic pain of right knee 06/28/2019     Priority: Medium    Hypercholesterolemia 10/04/2018     Priority: Medium    ACP (advance care planning) 07/10/2017     Priority: Medium     Advance Care Planning 7/10/2017: ACP Review of Chart / Resources Provided:  Reviewed chart for advance care plan.  Christiano Leon has no plan or code status on file. Discussed available resources and provided with information.   Added by TERRENCE MARTÍNEZ            Acquired hypothyroidism 07/10/2017     Priority: Medium    Dementia with behavioral disturbance, unspecified dementia type (H) 07/10/2017     Priority: Medium    Alcohol use disorder 07/10/2017     Priority: Medium        Past Medical History:    Past Medical History:   Diagnosis Date    BPH (benign prostatic hyperplasia)     Poor dentition        Past Surgical History:    Past  Surgical History:   Procedure Laterality Date    ARTHROPLASTY KNEE Right 2022    Procedure: RIGHT TOTAL KNEE ARTHROPLASTY;  Surgeon: Brody Dobson MD;  Location: [facility]    Bilateral cataract      COLONOSCOPY N/A 2021    Procedure: Colonoscopy;  Repeat colonoscopy due to incomplete colonoscopy;  Surgeon: Silvio Guzmán MD;  Location: [facility]    ENDOSCOPY UPPER, COLONOSCOPY, COMBINED N/A 2021    Procedure: colonoscopy attempted and upper endoscopy with biopsies;  Surgeon: Silvio Guzmán MD;  Location: [facility]    ESOPHAGOSCOPY, GASTROSCOPY, DUODENOSCOPY (EGD), COMBINED N/A 5/10/2019    Procedure: UPPER ENDOSCOPY WITH BIOPSIES;  Surgeon: Silvio Guzmán MD;  Location: [facility] OR    HERNIA REPAIR Right ?    Right knee meniscectomy Right        Family History:    No family history on file.    Social History:  Marital Status:  Single [1]  Social History     Tobacco Use    Smoking status: Former     Current packs/day: 0.00     Types: Cigarettes     Quit date: 1993     Years since quittin.3     Passive exposure: Past    Smokeless tobacco: Never   Vaping Use    Vaping status: Never Used   Substance Use Topics    Alcohol use: No        Medications:    acetaminophen (TYLENOL) 500 MG tablet  alum & mag hydroxide-simethicone (MAALOX) 200-200-20 MG/5ML SUSP suspension  aspirin (ASPIRIN LOW DOSE) 81 MG EC tablet  bisacodyl (DULCOLAX) 10 MG suppository  cloNIDine (CATAPRES) 0.1 MG tablet  diclofenac (VOLTAREN) 1 % topical gel  docusate sodium (COLACE) 100 MG capsule  hypromellose (ARTIFICIAL TEARS) 0.5 % SOLN ophthalmic solution  levothyroxine (SYNTHROID/LEVOTHROID) 50 MCG tablet  loperamide (IMODIUM) 2 MG capsule  magnesium hydroxide (MILK OF MAGNESIA) 400 MG/5ML suspension  meloxicam (MOBIC) 7.5 MG tablet  pantoprazole (PROTONIX) 40 MG EC tablet  QUEtiapine (SEROQUEL) 300 MG tablet  simvastatin (ZOCOR) 40 MG tablet  Sodium Phosphates (ENEMA READY-TO-USE) 7-19 GM/118ML ENEM  vitamin D3 25 mcg (1000 units)  tablet  Vitamins A & D (VITAMIN A & D) external ointment          Review of Systems   Unable to perform ROS: Dementia       Physical Exam   BP: 157/98  Pulse: 108  Temp: 97.4  F (36.3  C)  Resp: 18  SpO2: 95 %      Physical Exam  Vitals and nursing note reviewed.   Constitutional:       Appearance: Normal appearance. He is normal weight.   Pulmonary:      Effort: Pulmonary effort is normal.   Skin:     General: Skin is warm.   Neurological:      Mental Status: He is alert.         ED Course     ED Course as of 09/16/24 1759   Fri Sep 13, 2024   2007 Recent labs.  No reasonable indication for repeat.   Sat Sep 14, 2024   1251 Discussed with pharmacy and nursing to get med rec completed for patient so we can maintain his current medication list and administration   Mon Sep 16, 2024   0713 [facility] geriatric psych, pending bed placement at [facility] (in the [facility]) where he as a commitment.    0934 I did examine the patient, he is sleeping quietly   1758 No acute events during the day.  Social work did follow-up with [facility] and they were not aware of the patient nor working on establishing a plan for transfer.  She did reach out to [facility] who states they will set up transport for 7 AM tomorrow morning.   1759 Patient signed out with plan to transfer the patient at 7 AM tomorrow morning     Procedures              Critical Care time:  none               No results found for this or any previous visit (from the past 24 hour(s)).    Medications   acetaminophen (TYLENOL) tablet 975 mg (has no administration in time range)   alum & mag hydroxide-simethicone (MAALOX) suspension 30 mL (has no administration in time range)   bisacodyl (DULCOLAX) suppository 10 mg (has no administration in time range)   aspirin EC tablet 81 mg (81 mg Oral $Given 9/16/24 0948)   cloNIDine (CATAPRES) tablet 0.1 mg (0.1 mg Oral $Given 9/16/24 0951)   diclofenac (VOLTAREN) 1 % topical gel 4 g ( Topical Automatically Held  9/21/24 2100)   docusate sodium (COLACE) capsule 100 mg (100 mg Oral $Given 9/16/24 0948)   levothyroxine (SYNTHROID/LEVOTHROID) tablet 50 mcg (50 mcg Oral $Given 9/16/24 0842)   loperamide (IMODIUM) capsule 4 mg (has no administration in time range)   magnesium hydroxide (MILK OF MAGNESIA) suspension 30 mL (has no administration in time range)   meloxicam (MOBIC) tablet 7.5 mg (7.5 mg Oral $Given 9/16/24 0948)   pantoprazole (PROTONIX) EC tablet 40 mg (40 mg Oral $Given 9/16/24 0948)   QUEtiapine (SEROquel) tablet 900 mg (900 mg Oral $Given 9/15/24 2227)   simvastatin (ZOCOR) tablet 40 mg (40 mg Oral $Given 9/15/24 2226)   Vitamin D3 (CHOLECALCIFEROL) tablet 25 mcg (25 mcg Oral $Given 9/16/24 0949)   carboxymethylcellulose PF (REFRESH PLUS) 0.5 % ophthalmic solution 1 drop (has no administration in time range)       Assessments & Plan (with Medical Decision Making)   Revocation of discharge.  We will transport the patient back to [place] once a bed is available.  Recent serum testing on September 11.  There is no reasonable indication for repeat testing.    This document was prepared using a combination of typing and voice generated software.  While every attempt was made for accuracy, spelling and grammatical errors may exist.     I have reviewed the nursing notes.    I have reviewed the findings, diagnosis, plan and need for follow up with the patient.           Medical Decision Making  The patient's presentation was of moderate complexity (a chronic illness mild to moderate exacerbation, progression, or side effect of treatment).    The patient's evaluation involved:  review of 3+ test result(s) ordered prior to this encounter (review of multiple recent labs and notes)    The patient's management necessitated high risk (a decision regarding hospitalization).        New Prescriptions    No medications on file       Final diagnoses:   Dementia (H)       9/13/2024   [facility]       Giana Tran  JAMES  09/13/24 2014       Giana Tran PA-C  09/13/24 5201       Benigno Damon MD  09/14/24 184       Alexander Marsh MD  09/16/24 3500

## 2024-09-14 NOTE — ED TRIAGE NOTES
"Arrived to ER ambulatory escorted by [place] [employer] with  signed court order, signed at 1511 on 9/13/2024 by Jevon Mathis, for \"Order for Apprehension, Confinement and Return to Hospital/Treatment Facility/Treatment Program for Revocation of Provisional Discharge. [place] [employer] did not know the details of why provisional discharge was revoked and reported patient was very cooperative and calm with them. Patient also does not know why the provisional discharge was revoked. Patient is very calm and cooperative and answers questions appropriately. Per the court order patient was committed to [facility] at [address] [place] [address] and is to return there pending bed availability. Patient denies pain. Denies suicidal or homicidal ideation. One to one sitter at bedside.      Triage Assessment (Adult)       Row Name 09/13/24 2011          Triage Assessment    Airway WDL WDL        Respiratory WDL    Respiratory WDL WDL                     "

## 2024-09-15 PROCEDURE — 250N000013 HC RX MED GY IP 250 OP 250 PS 637: Performed by: EMERGENCY MEDICINE

## 2024-09-15 PROCEDURE — 250N000013 HC RX MED GY IP 250 OP 250 PS 637: Performed by: INTERNAL MEDICINE

## 2024-09-15 RX ORDER — ASPIRIN 81 MG/1
81 TABLET ORAL DAILY
Status: DISCONTINUED | OUTPATIENT
Start: 2024-09-15 | End: 2024-09-17 | Stop reason: HOSPADM

## 2024-09-15 RX ORDER — CARBOXYMETHYLCELLULOSE SODIUM 5 MG/ML
1 SOLUTION/ DROPS OPHTHALMIC 4 TIMES DAILY PRN
Status: DISCONTINUED | OUTPATIENT
Start: 2024-09-15 | End: 2024-09-17 | Stop reason: HOSPADM

## 2024-09-15 RX ORDER — MAGNESIUM HYDROXIDE/ALUMINUM HYDROXICE/SIMETHICONE 120; 1200; 1200 MG/30ML; MG/30ML; MG/30ML
30 SUSPENSION ORAL EVERY 4 HOURS PRN
Status: DISCONTINUED | OUTPATIENT
Start: 2024-09-15 | End: 2024-09-17 | Stop reason: HOSPADM

## 2024-09-15 RX ORDER — BISACODYL 10 MG
10 SUPPOSITORY, RECTAL RECTAL DAILY PRN
Status: DISCONTINUED | OUTPATIENT
Start: 2024-09-15 | End: 2024-09-17 | Stop reason: HOSPADM

## 2024-09-15 RX ORDER — QUETIAPINE FUMARATE 300 MG/1
900 TABLET, FILM COATED ORAL AT BEDTIME
Status: DISCONTINUED | OUTPATIENT
Start: 2024-09-15 | End: 2024-09-17 | Stop reason: HOSPADM

## 2024-09-15 RX ORDER — VITAMIN B COMPLEX
25 TABLET ORAL DAILY
Status: DISCONTINUED | OUTPATIENT
Start: 2024-09-15 | End: 2024-09-17 | Stop reason: HOSPADM

## 2024-09-15 RX ORDER — DOCUSATE SODIUM 100 MG/1
100 CAPSULE, LIQUID FILLED ORAL DAILY
Status: DISCONTINUED | OUTPATIENT
Start: 2024-09-15 | End: 2024-09-17 | Stop reason: HOSPADM

## 2024-09-15 RX ORDER — LOPERAMIDE HCL 2 MG
4 CAPSULE ORAL
Status: DISCONTINUED | OUTPATIENT
Start: 2024-09-15 | End: 2024-09-17 | Stop reason: HOSPADM

## 2024-09-15 RX ORDER — QUETIAPINE FUMARATE 300 MG/1
TABLET, FILM COATED ORAL
Status: COMPLETED
Start: 2024-09-15 | End: 2024-09-15

## 2024-09-15 RX ORDER — MELOXICAM 7.5 MG/1
7.5 TABLET ORAL DAILY
Status: DISCONTINUED | OUTPATIENT
Start: 2024-09-15 | End: 2024-09-17 | Stop reason: HOSPADM

## 2024-09-15 RX ORDER — PANTOPRAZOLE SODIUM 40 MG/1
40 TABLET, DELAYED RELEASE ORAL DAILY
Status: DISCONTINUED | OUTPATIENT
Start: 2024-09-15 | End: 2024-09-17 | Stop reason: HOSPADM

## 2024-09-15 RX ORDER — CLONIDINE HYDROCHLORIDE 0.1 MG/1
0.1 TABLET ORAL 2 TIMES DAILY
Status: DISCONTINUED | OUTPATIENT
Start: 2024-09-15 | End: 2024-09-17 | Stop reason: HOSPADM

## 2024-09-15 RX ORDER — SIMVASTATIN 20 MG
40 TABLET ORAL AT BEDTIME
Status: DISCONTINUED | OUTPATIENT
Start: 2024-09-15 | End: 2024-09-17 | Stop reason: HOSPADM

## 2024-09-15 RX ORDER — LEVOTHYROXINE SODIUM 25 UG/1
50 TABLET ORAL DAILY
Status: DISCONTINUED | OUTPATIENT
Start: 2024-09-15 | End: 2024-09-17 | Stop reason: HOSPADM

## 2024-09-15 RX ORDER — ACETAMINOPHEN 325 MG/1
975 TABLET ORAL EVERY 6 HOURS PRN
Status: DISCONTINUED | OUTPATIENT
Start: 2024-09-15 | End: 2024-09-17 | Stop reason: HOSPADM

## 2024-09-15 RX ADMIN — CLONIDINE HYDROCHLORIDE 0.1 MG: 0.1 TABLET ORAL at 09:30

## 2024-09-15 RX ADMIN — QUETIAPINE FUMARATE 900 MG: 300 TABLET ORAL at 01:10

## 2024-09-15 RX ADMIN — LEVOTHYROXINE SODIUM 50 MCG: 0.03 TABLET ORAL at 09:31

## 2024-09-15 RX ADMIN — QUETIAPINE FUMARATE 900 MG: 300 TABLET ORAL at 22:27

## 2024-09-15 RX ADMIN — SIMVASTATIN 40 MG: 20 TABLET, FILM COATED ORAL at 22:26

## 2024-09-15 RX ADMIN — PANTOPRAZOLE SODIUM 40 MG: 40 TABLET, DELAYED RELEASE ORAL at 09:29

## 2024-09-15 RX ADMIN — Medication 25 MCG: at 09:32

## 2024-09-15 RX ADMIN — MELOXICAM 7.5 MG: 7.5 TABLET ORAL at 09:32

## 2024-09-15 RX ADMIN — DOCUSATE SODIUM 100 MG: 100 CAPSULE, LIQUID FILLED ORAL at 09:30

## 2024-09-15 RX ADMIN — QUETIAPINE FUMARATE 900 MG: 300 TABLET, FILM COATED ORAL at 01:10

## 2024-09-15 RX ADMIN — CLONIDINE HYDROCHLORIDE 0.1 MG: 0.1 TABLET ORAL at 22:22

## 2024-09-15 RX ADMIN — ASPIRIN 81 MG: 81 TABLET, COATED ORAL at 09:30

## 2024-09-15 ASSESSMENT — ACTIVITIES OF DAILY LIVING (ADL)
ADLS_ACUITY_SCORE: 38

## 2024-09-15 NOTE — ED NOTES
Pt restless and having difficulty falling asleep. Cooperative.Requested Dr Uribe to order regular scheduled evening meds for pt.

## 2024-09-15 NOTE — ED NOTES
Patient resting in bed with eyes closed laying on R side. RR 16. Independently moving in bed. 1:1 security remains in place outside of room. No obvious non-verbal indicators of pain evident at this time.

## 2024-09-15 NOTE — ED NOTES
"Pt awake. Informed writer will administer seroquel. Pleasant and cooperative. States \"the seroquel usually knocks me out for the night\". Has no other requests or questions.  "

## 2024-09-15 NOTE — ED NOTES
Face to face report given with opportunity to observe patient.    Report given to DELVIN Cortez RN   9/14/2024  11:04 PM

## 2024-09-16 PROCEDURE — 250N000013 HC RX MED GY IP 250 OP 250 PS 637: Performed by: EMERGENCY MEDICINE

## 2024-09-16 RX ADMIN — SIMVASTATIN 40 MG: 20 TABLET, FILM COATED ORAL at 22:02

## 2024-09-16 RX ADMIN — ASPIRIN 81 MG: 81 TABLET, COATED ORAL at 09:48

## 2024-09-16 RX ADMIN — CLONIDINE HYDROCHLORIDE 0.1 MG: 0.1 TABLET ORAL at 09:51

## 2024-09-16 RX ADMIN — Medication 25 MCG: at 09:49

## 2024-09-16 RX ADMIN — QUETIAPINE FUMARATE 900 MG: 300 TABLET ORAL at 22:02

## 2024-09-16 RX ADMIN — LEVOTHYROXINE SODIUM 50 MCG: 0.03 TABLET ORAL at 08:42

## 2024-09-16 RX ADMIN — DOCUSATE SODIUM 100 MG: 100 CAPSULE, LIQUID FILLED ORAL at 09:48

## 2024-09-16 RX ADMIN — MELOXICAM 7.5 MG: 7.5 TABLET ORAL at 09:48

## 2024-09-16 RX ADMIN — PANTOPRAZOLE SODIUM 40 MG: 40 TABLET, DELAYED RELEASE ORAL at 09:48

## 2024-09-16 RX ADMIN — CLONIDINE HYDROCHLORIDE 0.1 MG: 0.1 TABLET ORAL at 22:02

## 2024-09-16 ASSESSMENT — ACTIVITIES OF DAILY LIVING (ADL)
ADLS_ACUITY_SCORE: 38

## 2024-09-16 NOTE — ED NOTES
Care Transitions focused note:      Call to [facility] intake Danyelle Cowan  [phone]    Pt's commitment was revoked and he was brought in yesterday by Bong CALDERÓN    Danyelle stated she is working on trying to find a deputy in [facility] to transport and this can take some time.  I offered to have our staff arrange ambulance transfer if they will take him today and she stated she will check with her medical staff and get back to me.    Updated Dr Marsh.  Will await call back.    JERMAN Ortiz

## 2024-09-16 NOTE — ED NOTES
Care Transitions focused note:      Call from Danyelle at [facility].  Pt has been accepted but not until Tuesday Sept 17th on the [facility].     [facility] is unable to transport until Wednesday, so we are going to try and work on an ambulance transport for tomorrow morning.    Call to Jovantyson Klein, [facility] [phone] VM left.  I want to make sure we have something on Wednesday in case we cannot secure an ambulance for tomrorrow.    Updated Carol BRICENO ED tech to assist with scheduling ambulance.    Will follow.    JERMAN Ortiz

## 2024-09-16 NOTE — ED NOTES
Woke patient up to give him his morning levothyroxine.  Took without difficulty, pleasant mood.  Told writer to come back later and he will order breakfast then.  Continuous observation remains.

## 2024-09-16 NOTE — ED NOTES
Face to face report given with opportunity to observe patient.    Report given to DELVIN Cortez RN   9/15/2024  7:05 PM

## 2024-09-16 NOTE — ED NOTES
Care Transitions focused note:      [facility] will transport Tuesday 9/17 at 7 am    Notified Danyelle Cowan at [facility], she will notify her staff of JERMAN Gray

## 2024-09-17 VITALS
OXYGEN SATURATION: 94 % | TEMPERATURE: 96 F | RESPIRATION RATE: 16 BRPM | DIASTOLIC BLOOD PRESSURE: 77 MMHG | HEART RATE: 64 BPM | SYSTOLIC BLOOD PRESSURE: 121 MMHG

## 2024-09-17 ASSESSMENT — ACTIVITIES OF DAILY LIVING (ADL)
ADLS_ACUITY_SCORE: 38

## 2024-09-17 NOTE — ED NOTES
Care Transitions focused note:      Call to [facility] accepting physician at [facility] is     Dr Mary Ellen Mena    Per Suleiman Dickey     JERMAN Ortiz

## 2024-09-17 NOTE — ED NOTES
Face to face report given with opportunity to observe patient.    Report given to DELVIN Sarabia RN   9/17/2024  7:07 AM

## (undated) DEVICE — ICEMAN W/UNIVERSAL WRAP-ON PAD

## (undated) DEVICE — LUBRICANT JELLY 2OZ. TUBE

## (undated) DEVICE — CANISTER-SUCTION 2000CC

## (undated) DEVICE — IRRIGATION-H2O 1000ML

## (undated) DEVICE — PACK-BASIN SET-UP

## (undated) DEVICE — FORCEP-COLON BIOPSY STD W/NEEDLE 160CM

## (undated) DEVICE — GLV-7.5 ORTHO PROTEXIS PI LF/PF

## (undated) DEVICE — PULSE LAVAGE IRRIGATION SYSTEM

## (undated) DEVICE — CUFF-DISP STERILE 30IN SINGLE BLADDER

## (undated) DEVICE — IMMOB-KNEE 20 INCH

## (undated) DEVICE — TUBING-SUCTION 20FT

## (undated) DEVICE — SENSOR-OXISENSOR II ADULT

## (undated) DEVICE — CAUTERY PAD-POLYHESIVE II ADULT

## (undated) DEVICE — CONNECTOR-ERBEFLO 2 PORT

## (undated) DEVICE — LABEL-STERILE PREPRINTED FOR OR

## (undated) DEVICE — SUTURE-VICRYL 2-0 CT-1 VCP259H

## (undated) DEVICE — SYRINGE-30CC SLIP TIP

## (undated) DEVICE — GLV-8.0 PROTEXIS PI BLUE W/NEU-THERA LF/PF

## (undated) DEVICE — DRILL PIN HEADLESS TROCAR

## (undated) DEVICE — CARTRIDGE-CEMENT MIXING

## (undated) DEVICE — IRRIGATION-NACL 3000ML (BAG)

## (undated) DEVICE — BLADE-RECIP HEAVY DUTY LONG

## (undated) DEVICE — CAUTERY PENCIL-W/SUCTION 10FR FOOTSWITCHING

## (undated) DEVICE — PACK-KNEE-CUSTOM

## (undated) DEVICE — IRRIGATION-NACL 1000ML

## (undated) DEVICE — MOUTHPIECE W/GUARD FOR ENDOSCOPY

## (undated) DEVICE — SCD SLEEVE-KNEE REG.

## (undated) DEVICE — BLADE-SAGITTAL DUAL CUT 25MM X 100MM X 1.27MM

## (undated) DEVICE — FORCEP-COLON BIOPSY LARGE W/NEEDLE 240CM

## (undated) DEVICE — APPLICATOR-CHLORAPREP 26ML TINTED CHG 2%+ 70% IPA-SURGICAL

## (undated) DEVICE — SUTURE-VICRYL 1 CT-1 POP-OFF J741D

## (undated) DEVICE — Device

## (undated) DEVICE — LIGHT HANDLE COVER FOR SKYTRON LIGHTS

## (undated) DEVICE — DRSG-AQUACEL AG 3.5" X 10" SURGICAL

## (undated) RX ORDER — LIDOCAINE HYDROCHLORIDE 20 MG/ML
INJECTION, SOLUTION EPIDURAL; INFILTRATION; INTRACAUDAL; PERINEURAL
Status: DISPENSED
Start: 2019-05-10

## (undated) RX ORDER — EPHEDRINE SULFATE 50 MG/ML
INJECTION, SOLUTION INTRAVENOUS
Status: DISPENSED
Start: 2022-04-01

## (undated) RX ORDER — PROPOFOL 10 MG/ML
INJECTION, EMULSION INTRAVENOUS
Status: DISPENSED
Start: 2022-04-01

## (undated) RX ORDER — PROPOFOL 10 MG/ML
INJECTION, EMULSION INTRAVENOUS
Status: DISPENSED
Start: 2019-05-10

## (undated) RX ORDER — LIDOCAINE HYDROCHLORIDE 20 MG/ML
INJECTION, SOLUTION EPIDURAL; INFILTRATION; INTRACAUDAL; PERINEURAL
Status: DISPENSED
Start: 2022-04-01

## (undated) RX ORDER — FENTANYL CITRATE 50 UG/ML
INJECTION, SOLUTION INTRAMUSCULAR; INTRAVENOUS
Status: DISPENSED
Start: 2022-04-01

## (undated) RX ORDER — LIDOCAINE HYDROCHLORIDE 20 MG/ML
INJECTION, SOLUTION EPIDURAL; INFILTRATION; INTRACAUDAL; PERINEURAL
Status: DISPENSED
Start: 2021-07-22

## (undated) RX ORDER — PROPOFOL 10 MG/ML
INJECTION, EMULSION INTRAVENOUS
Status: DISPENSED
Start: 2021-07-22